# Patient Record
Sex: MALE | Race: WHITE | NOT HISPANIC OR LATINO | Employment: OTHER | URBAN - NONMETROPOLITAN AREA
[De-identification: names, ages, dates, MRNs, and addresses within clinical notes are randomized per-mention and may not be internally consistent; named-entity substitution may affect disease eponyms.]

---

## 2017-01-20 ENCOUNTER — APPOINTMENT (OUTPATIENT)
Dept: LAB | Facility: MEDICAL CENTER | Age: 72
End: 2017-01-20
Payer: COMMERCIAL

## 2017-01-20 ENCOUNTER — TRANSCRIBE ORDERS (OUTPATIENT)
Dept: LAB | Facility: MEDICAL CENTER | Age: 72
End: 2017-01-20

## 2017-01-20 DIAGNOSIS — I49.9 CARDIAC ARRHYTHMIA: ICD-10-CM

## 2017-01-20 DIAGNOSIS — R73.09 OTHER ABNORMAL GLUCOSE: ICD-10-CM

## 2017-01-20 DIAGNOSIS — E78.5 HYPERLIPIDEMIA: ICD-10-CM

## 2017-01-20 DIAGNOSIS — I48.91 ATRIAL FIBRILLATION (HCC): ICD-10-CM

## 2017-01-20 LAB
ALBUMIN SERPL BCP-MCNC: 4 G/DL (ref 3.5–5)
ALP SERPL-CCNC: 50 U/L (ref 46–116)
ALT SERPL W P-5'-P-CCNC: 42 U/L (ref 12–78)
ANION GAP SERPL CALCULATED.3IONS-SCNC: 6 MMOL/L (ref 4–13)
AST SERPL W P-5'-P-CCNC: 24 U/L (ref 5–45)
BILIRUB SERPL-MCNC: 1.31 MG/DL (ref 0.2–1)
BUN SERPL-MCNC: 18 MG/DL (ref 5–25)
CALCIUM SERPL-MCNC: 8.9 MG/DL (ref 8.3–10.1)
CHLORIDE SERPL-SCNC: 101 MMOL/L (ref 100–108)
CHOLEST SERPL-MCNC: 238 MG/DL (ref 50–200)
CO2 SERPL-SCNC: 30 MMOL/L (ref 21–32)
CREAT SERPL-MCNC: 0.94 MG/DL (ref 0.6–1.3)
EST. AVERAGE GLUCOSE BLD GHB EST-MCNC: 148 MG/DL
GFR SERPL CREATININE-BSD FRML MDRD: >60 ML/MIN/1.73SQ M
GLUCOSE SERPL-MCNC: 156 MG/DL (ref 65–140)
HBA1C MFR BLD: 6.8 % (ref 4.2–6.3)
HDLC SERPL-MCNC: 87 MG/DL (ref 40–60)
LDLC SERPL CALC-MCNC: 122 MG/DL (ref 0–100)
POTASSIUM SERPL-SCNC: 4.4 MMOL/L (ref 3.5–5.3)
PROT SERPL-MCNC: 7.9 G/DL (ref 6.4–8.2)
SODIUM SERPL-SCNC: 137 MMOL/L (ref 136–145)
TESTOST SERPL-MCNC: 562.7 NG/DL (ref 241–827)
TRIGL SERPL-MCNC: 147 MG/DL
TSH SERPL DL<=0.05 MIU/L-ACNC: 1.26 UIU/ML (ref 0.36–3.74)

## 2017-01-20 PROCEDURE — 80061 LIPID PANEL: CPT

## 2017-01-20 PROCEDURE — 80053 COMPREHEN METABOLIC PANEL: CPT

## 2017-01-20 PROCEDURE — 84443 ASSAY THYROID STIM HORMONE: CPT

## 2017-01-20 PROCEDURE — 84403 ASSAY OF TOTAL TESTOSTERONE: CPT

## 2017-01-20 PROCEDURE — 83036 HEMOGLOBIN GLYCOSYLATED A1C: CPT

## 2017-01-20 PROCEDURE — 36415 COLL VENOUS BLD VENIPUNCTURE: CPT

## 2017-02-23 ENCOUNTER — ALLSCRIPTS OFFICE VISIT (OUTPATIENT)
Dept: OTHER | Facility: OTHER | Age: 72
End: 2017-02-23

## 2017-04-04 ENCOUNTER — TRANSCRIBE ORDERS (OUTPATIENT)
Dept: ADMINISTRATIVE | Facility: HOSPITAL | Age: 72
End: 2017-04-04

## 2017-04-04 ENCOUNTER — ALLSCRIPTS OFFICE VISIT (OUTPATIENT)
Dept: OTHER | Facility: OTHER | Age: 72
End: 2017-04-04

## 2017-04-04 DIAGNOSIS — I35.1 AORTIC VALVE REGURGITATION, UNSPECIFIED ETIOLOGY: Primary | ICD-10-CM

## 2017-04-12 ENCOUNTER — HOSPITAL ENCOUNTER (OUTPATIENT)
Dept: NON INVASIVE DIAGNOSTICS | Facility: HOSPITAL | Age: 72
Discharge: HOME | End: 2017-04-12
Attending: INTERNAL MEDICINE
Payer: COMMERCIAL

## 2017-04-12 DIAGNOSIS — I35.1 AORTIC VALVE REGURGITATION, UNSPECIFIED ETIOLOGY: ICD-10-CM

## 2017-04-12 PROCEDURE — 93306 TTE W/DOPPLER COMPLETE: CPT

## 2017-04-12 RX ADMIN — PERFLUTREN 4 ML/MIN: 6.52 INJECTION, SUSPENSION INTRAVENOUS at 13:50

## 2017-06-04 DIAGNOSIS — R60.0 LOCALIZED EDEMA: ICD-10-CM

## 2017-06-04 DIAGNOSIS — I35.1 NONRHEUMATIC AORTIC VALVE INSUFFICIENCY: ICD-10-CM

## 2017-06-04 DIAGNOSIS — E78.5 HYPERLIPIDEMIA: ICD-10-CM

## 2017-06-22 ENCOUNTER — TRANSCRIBE ORDERS (OUTPATIENT)
Dept: LAB | Facility: MEDICAL CENTER | Age: 72
End: 2017-06-22

## 2017-06-23 ENCOUNTER — TRANSCRIBE ORDERS (OUTPATIENT)
Dept: LAB | Facility: MEDICAL CENTER | Age: 72
End: 2017-06-23

## 2017-06-23 ENCOUNTER — APPOINTMENT (OUTPATIENT)
Dept: LAB | Facility: MEDICAL CENTER | Age: 72
End: 2017-06-23
Payer: COMMERCIAL

## 2017-06-23 DIAGNOSIS — I35.1 NONRHEUMATIC AORTIC VALVE INSUFFICIENCY: ICD-10-CM

## 2017-06-23 DIAGNOSIS — E78.5 HYPERLIPIDEMIA: ICD-10-CM

## 2017-06-23 DIAGNOSIS — R60.0 LOCALIZED EDEMA: ICD-10-CM

## 2017-06-23 LAB
ALBUMIN SERPL BCP-MCNC: 4 G/DL (ref 3.5–5)
ALP SERPL-CCNC: 48 U/L (ref 46–116)
ALT SERPL W P-5'-P-CCNC: 53 U/L (ref 12–78)
ANION GAP SERPL CALCULATED.3IONS-SCNC: 9 MMOL/L (ref 4–13)
AST SERPL W P-5'-P-CCNC: 33 U/L (ref 5–45)
BILIRUB SERPL-MCNC: 1.54 MG/DL (ref 0.2–1)
BUN SERPL-MCNC: 18 MG/DL (ref 5–25)
CALCIUM SERPL-MCNC: 8.7 MG/DL (ref 8.3–10.1)
CHLORIDE SERPL-SCNC: 102 MMOL/L (ref 100–108)
CHOLEST SERPL-MCNC: 165 MG/DL (ref 50–200)
CO2 SERPL-SCNC: 27 MMOL/L (ref 21–32)
CREAT SERPL-MCNC: 0.85 MG/DL (ref 0.6–1.3)
GFR SERPL CREATININE-BSD FRML MDRD: >60 ML/MIN/1.73SQ M
GLUCOSE P FAST SERPL-MCNC: 151 MG/DL (ref 65–99)
HDLC SERPL-MCNC: 84 MG/DL (ref 40–60)
LDLC SERPL CALC-MCNC: 64 MG/DL (ref 0–100)
POTASSIUM SERPL-SCNC: 3.7 MMOL/L (ref 3.5–5.3)
PROT SERPL-MCNC: 7.4 G/DL (ref 6.4–8.2)
SODIUM SERPL-SCNC: 138 MMOL/L (ref 136–145)
TRIGL SERPL-MCNC: 83 MG/DL

## 2017-06-23 PROCEDURE — 80061 LIPID PANEL: CPT

## 2017-06-23 PROCEDURE — 80053 COMPREHEN METABOLIC PANEL: CPT

## 2017-06-23 PROCEDURE — 36415 COLL VENOUS BLD VENIPUNCTURE: CPT

## 2017-07-13 ENCOUNTER — ALLSCRIPTS OFFICE VISIT (OUTPATIENT)
Dept: OTHER | Facility: OTHER | Age: 72
End: 2017-07-13

## 2017-07-13 DIAGNOSIS — R06.02 SHORTNESS OF BREATH: ICD-10-CM

## 2017-07-13 DIAGNOSIS — I25.10 ATHEROSCLEROTIC HEART DISEASE OF NATIVE CORONARY ARTERY WITHOUT ANGINA PECTORIS: ICD-10-CM

## 2017-07-18 ENCOUNTER — TRANSCRIBE ORDERS (OUTPATIENT)
Dept: SLEEP CENTER | Facility: HOSPITAL | Age: 72
End: 2017-07-18

## 2017-07-18 DIAGNOSIS — G47.33 OBSTRUCTIVE SLEEP APNEA (ADULT) (PEDIATRIC): Primary | ICD-10-CM

## 2017-07-19 ENCOUNTER — HOSPITAL ENCOUNTER (OUTPATIENT)
Dept: SLEEP CENTER | Facility: HOSPITAL | Age: 72
Discharge: HOME | End: 2017-07-19
Attending: INTERNAL MEDICINE
Payer: COMMERCIAL

## 2017-07-19 DIAGNOSIS — G47.33 OBSTRUCTIVE SLEEP APNEA (ADULT) (PEDIATRIC): ICD-10-CM

## 2017-07-19 PROCEDURE — G0399 HOME SLEEP TEST/TYPE 3 PORTA: HCPCS

## 2017-07-21 ENCOUNTER — APPOINTMENT (OUTPATIENT)
Dept: LAB | Facility: HOSPITAL | Age: 72
End: 2017-07-21
Attending: INTERNAL MEDICINE
Payer: COMMERCIAL

## 2017-07-21 DIAGNOSIS — G47.30 SLEEP APNEA: ICD-10-CM

## 2017-07-21 LAB
ANION GAP SERPL CALCULATED.3IONS-SCNC: 12 MMOL/L (ref 4–13)
BUN SERPL-MCNC: 18 MG/DL (ref 5–25)
CALCIUM SERPL-MCNC: 8.8 MG/DL (ref 8.3–10.1)
CHLORIDE SERPL-SCNC: 102 MMOL/L (ref 100–108)
CO2 SERPL-SCNC: 26 MMOL/L (ref 21–32)
CREAT SERPL-MCNC: 0.94 MG/DL (ref 0.6–1.3)
GFR SERPL CREATININE-BSD FRML MDRD: >60 ML/MIN/1.73SQ M
GLUCOSE P FAST SERPL-MCNC: 118 MG/DL (ref 65–99)
POTASSIUM SERPL-SCNC: 3.8 MMOL/L (ref 3.5–5.3)
SODIUM SERPL-SCNC: 140 MMOL/L (ref 136–145)

## 2017-07-21 PROCEDURE — 36415 COLL VENOUS BLD VENIPUNCTURE: CPT

## 2017-07-21 PROCEDURE — 80048 BASIC METABOLIC PNL TOTAL CA: CPT

## 2017-07-25 ENCOUNTER — TRANSCRIBE ORDERS (OUTPATIENT)
Dept: SLEEP CENTER | Facility: HOSPITAL | Age: 72
End: 2017-07-25

## 2017-07-25 DIAGNOSIS — G47.33 OBSTRUCTIVE SLEEP APNEA (ADULT) (PEDIATRIC): Primary | ICD-10-CM

## 2017-08-07 ENCOUNTER — TRANSCRIBE ORDERS (OUTPATIENT)
Dept: ADMINISTRATIVE | Facility: HOSPITAL | Age: 72
End: 2017-08-07

## 2017-08-07 DIAGNOSIS — I25.119 ATHEROSCLEROSIS OF NATIVE CORONARY ARTERY OF NATIVE HEART WITH ANGINA PECTORIS (HCC): Primary | ICD-10-CM

## 2017-08-07 DIAGNOSIS — R06.02 SHORTNESS OF BREATH: ICD-10-CM

## 2017-08-13 DIAGNOSIS — G47.30 SLEEP APNEA: ICD-10-CM

## 2017-08-14 ENCOUNTER — HOSPITAL ENCOUNTER (OUTPATIENT)
Dept: NUCLEAR MEDICINE | Facility: HOSPITAL | Age: 72
Discharge: HOME | End: 2017-08-14
Attending: INTERNAL MEDICINE
Payer: COMMERCIAL

## 2017-08-14 ENCOUNTER — GENERIC CONVERSION - ENCOUNTER (OUTPATIENT)
Dept: OTHER | Facility: OTHER | Age: 72
End: 2017-08-14

## 2017-08-14 DIAGNOSIS — R06.02 SHORTNESS OF BREATH: ICD-10-CM

## 2017-08-14 DIAGNOSIS — I25.10 ATHEROSCLEROTIC HEART DISEASE OF NATIVE CORONARY ARTERY WITHOUT ANGINA PECTORIS: ICD-10-CM

## 2017-08-14 PROCEDURE — A9502 TC99M TETROFOSMIN: HCPCS

## 2017-08-14 PROCEDURE — 78452 HT MUSCLE IMAGE SPECT MULT: CPT

## 2017-08-14 PROCEDURE — 93017 CV STRESS TEST TRACING ONLY: CPT

## 2017-08-17 ENCOUNTER — HOSPITAL ENCOUNTER (OUTPATIENT)
Dept: SLEEP CENTER | Facility: HOSPITAL | Age: 72
Discharge: HOME | End: 2017-08-17
Attending: INTERNAL MEDICINE
Payer: COMMERCIAL

## 2017-08-17 DIAGNOSIS — G47.33 OBSTRUCTIVE SLEEP APNEA (ADULT) (PEDIATRIC): ICD-10-CM

## 2017-08-17 PROCEDURE — 95811 POLYSOM 6/>YRS CPAP 4/> PARM: CPT

## 2017-08-24 ENCOUNTER — TRANSCRIBE ORDERS (OUTPATIENT)
Dept: SLEEP CENTER | Facility: HOSPITAL | Age: 72
End: 2017-08-24

## 2017-08-24 DIAGNOSIS — G47.33 OBSTRUCTIVE SLEEP APNEA (ADULT) (PEDIATRIC): Primary | ICD-10-CM

## 2017-08-31 ENCOUNTER — HOSPITAL ENCOUNTER (OUTPATIENT)
Dept: SLEEP CENTER | Facility: HOSPITAL | Age: 72
Discharge: HOME | End: 2017-08-31
Attending: INTERNAL MEDICINE
Payer: COMMERCIAL

## 2017-08-31 DIAGNOSIS — G47.33 OBSTRUCTIVE SLEEP APNEA (ADULT) (PEDIATRIC): ICD-10-CM

## 2017-09-05 ENCOUNTER — TRANSCRIBE ORDERS (OUTPATIENT)
Dept: SLEEP CENTER | Facility: HOSPITAL | Age: 72
End: 2017-09-05

## 2017-09-05 DIAGNOSIS — G47.33 OBSTRUCTIVE SLEEP APNEA (ADULT) (PEDIATRIC): Primary | ICD-10-CM

## 2017-09-19 ENCOUNTER — ALLSCRIPTS OFFICE VISIT (OUTPATIENT)
Dept: OTHER | Facility: OTHER | Age: 72
End: 2017-09-19

## 2017-09-19 DIAGNOSIS — I73.9 PERIPHERAL VASCULAR DISEASE (HCC): ICD-10-CM

## 2017-09-19 DIAGNOSIS — R06.02 SHORTNESS OF BREATH: ICD-10-CM

## 2017-09-25 ENCOUNTER — HOSPITAL ENCOUNTER (OUTPATIENT)
Dept: ULTRASOUND IMAGING | Facility: HOSPITAL | Age: 72
Discharge: HOME | End: 2017-09-25
Attending: INTERNAL MEDICINE
Payer: COMMERCIAL

## 2017-09-25 ENCOUNTER — TRANSCRIBE ORDERS (OUTPATIENT)
Dept: SLEEP CENTER | Facility: HOSPITAL | Age: 72
End: 2017-09-25

## 2017-09-25 DIAGNOSIS — I73.9 PERIPHERAL VASCULAR DISEASE (HCC): ICD-10-CM

## 2017-09-25 DIAGNOSIS — G47.33 OBSTRUCTIVE SLEEP APNEA (ADULT) (PEDIATRIC): Primary | ICD-10-CM

## 2017-09-25 PROCEDURE — 93925 LOWER EXTREMITY STUDY: CPT

## 2017-09-25 PROCEDURE — 93923 UPR/LXTR ART STDY 3+ LVLS: CPT

## 2017-09-26 ENCOUNTER — ALLSCRIPTS OFFICE VISIT (OUTPATIENT)
Dept: OTHER | Facility: OTHER | Age: 72
End: 2017-09-26

## 2017-10-06 ENCOUNTER — TRANSCRIBE ORDERS (OUTPATIENT)
Dept: LAB | Facility: MEDICAL CENTER | Age: 72
End: 2017-10-06

## 2017-10-06 ENCOUNTER — APPOINTMENT (OUTPATIENT)
Dept: RADIOLOGY | Facility: MEDICAL CENTER | Age: 72
End: 2017-10-06
Payer: COMMERCIAL

## 2017-10-06 DIAGNOSIS — R06.02 SHORTNESS OF BREATH: ICD-10-CM

## 2017-10-06 PROCEDURE — 71020 HB CHEST X-RAY 2VW FRONTAL&LATL: CPT

## 2018-01-10 NOTE — PROGRESS NOTES
Assessment    1  Encounter for preventive health examination (V70 0) (Z00 00)    Plan  Atrial fibrillation, Coronary artery disease, Hypertension, Neuropathy    · Follow-up visit in 4 Months Evaluation and Treatment  Follow-up  Status: Complete  Done:  [id number]    Chief Complaint  pt is being seen today for a wellness visit  discussed medication with pt   no med refills needed today  Active Problems    1  Atrial fibrillation (427 31) (I48 91)   2  Coronary artery disease (414 00) (I25 10)   3  Counseling regarding advanced directives (V65 49) (Z71 89)   4  Diarrhea (787 91) (R19 7)   5  Dyslipidemia (272 4) (E78 5)   6  GERD without esophagitis (530 81) (K21 9)   7  Glaucoma screening (V80 1) (Z13 5)   8  Hypertension (401 9) (I10)   9  Irregular cardiac rhythm (427 9) (I49 9)   10  Lesion of bladder (596 9) (N32 9)   11  Liver lesion (573 8) (K76 9)   12  Microscopic hematuria (599 72) (R31 2)   13  Nasal congestion (478 19) (R09 81)   14  Neuropathy (355 9) (G62 9)   15  No advance directives (V49 89) (Z78 9)   16  Obesity (278 00) (E66 9)   17  Obstructive hydrocephalus (331 4) (G91 1)   18  Other muscle spasm (728 85) (M62 838)   19  Paroxysmal atrial tachycardia (427 0) (I47 1)   20  Renovascular hypertension (405 91) (I15 0)   21   Rheumatoid arthritis (714 0) (M06 9)    Past Medical History    · History of Contact dermatitis due to poison ivy (692 6) (L23 7)   · History of Encounter for screening colonoscopy (V76 51) (Z12 11)   · History of gout (V12 29) (Z87 39)   · History of Pre-op chest exam (V72 82) (Z01 811)   · History of Pre-op exam (V72 84) (Z01 818)   · History of Special screening examination for neoplasm of prostate (V76 44) (Z12 5)   · History of Vitamin D deficiency (268 9) (E55 9)    Surgical History    · History of Appendectomy   · History of Cardiac Cath Procedure Summary   · History of Diagnostic Cystoscopy   · History of Hemorrhoidectomy   · History of Lower Back Surgery   · History of Partial Colectomy    Family History    · Family history of Diabetes Mellitus (V18 0)   · Family history of Stroke Syndrome (V17 1)    · Denied: Family history of Coronary artery disease, occlusive   · Denied: Family history of Drug abuse    Social History    · Alcohol use (V49 89) (Z78 9)   · Dental care, regularly   · Denied: History of Drug use   · Former smoker (V15 82) (Z87 891)   · Good dental hygiene   ·    ·    · No advance directives (V49 89) (Z78 9)   · No caffeine use   · Uses Safety Equipment - Seatbelts    Current Meds   1  Atorvastatin Calcium 40 MG Oral Tablet; Take 1 tablet daily; Therapy: 11LGI6467 to (Evaluate:21Mar2016)  Requested for: 92DER9553; Last   Rx:27Mar2015 Ordered   2  Folic Acid 1 MG Oral Tablet; TAKE (1) TABLET BY MOUTH DAILY; Therapy: 03ZHM2351 to (Last Rx:13Jan2016)  Requested for: 60MER1473 Ordered   3  Furosemide 20 MG Oral Tablet; TAKE (1) TABLET DAILY; Therapy: 90GVY8749 to (Last Rx:13Jan2016)  Requested for: 14VJV3314 Ordered   4  Gabapentin 300 MG Oral Capsule; TAKE (1) CAPSULE DAILY AT BEDTIME; Therapy: 37YTR3357 to (Last Rx:13Jan2016)  Requested for: 32HBY5742 Ordered   5  Lisinopril 20 MG Oral Tablet; TAKE (1) TABLET BY MOUTH TWICE DAILY; Therapy: 39NOX8989 to (Last Rx:80Edg3760)  Requested for: 85Cax9187 Ordered   6  Losartan Potassium 100 MG Oral Tablet; TAKE (1) TABLET DAILY; Therapy: 68DHU1389 to (Last Rx:13Jan2016)  Requested for: 55BQT0418 Ordered   7  NIFEdipine ER Osmotic Release 60 MG Oral Tablet Extended Release 24 Hour; TAKE 1   TABLET DAILY; Therapy: 09ZYC6713 to (Evaluate:10Mar2017)  Requested for: 51QIC4947; Last   Rx:15Mar2016 Ordered   8  Omeprazole 20 MG Oral Capsule Delayed Release; TAKE (1) CAPSULE DAILY; Therapy: 80CXL9343 to (Last Rx:77Jqu0485)  Requested for: 97FZV7767 Ordered   9  PredniSONE 5 MG Oral Tablet; Take 1 tablet by mouth daily; Therapy: 81AZN2278 to (Last Rx:15Mar2016)  Requested for: 90EDV2685 Ordered   10  Welchol 625 MG Oral Tablet; TAKE ONE TABLET BY MOUTH TWICE A DAY; Therapy: 12ATK8876 to (Last Rx:45Frx8301)  Requested for: 82AMA5830 Ordered    Allergies    1  No Known Drug Allergies    Vitals   Recorded: 50Uly8263 09:26AM Recorded: 18Apr2016 09:18AM   Temperature  13 0 F   Systolic 273    Diastolic 72    Height  5 ft 9 in   Weight  264 lb 4 00 oz   BMI Calculated  39 02   BSA Calculated  2 32     Future Appointments    Date/Time Provider Specialty Site   08/23/2016 09:30 AM Halina Paget, DO Internal Medicine [facility]     Signatures   Electronically signed by : Olaf Mendez DO;  Apr 19 2016  7:29AM EST                       (Author)

## 2018-01-12 VITALS
SYSTOLIC BLOOD PRESSURE: 150 MMHG | HEART RATE: 90 BPM | WEIGHT: 263.13 LBS | BODY MASS INDEX: 51.66 KG/M2 | DIASTOLIC BLOOD PRESSURE: 84 MMHG | HEIGHT: 60 IN

## 2018-01-13 VITALS
DIASTOLIC BLOOD PRESSURE: 76 MMHG | HEIGHT: 69 IN | SYSTOLIC BLOOD PRESSURE: 160 MMHG | BODY MASS INDEX: 40.43 KG/M2 | WEIGHT: 273 LBS | HEART RATE: 82 BPM

## 2018-01-13 VITALS
SYSTOLIC BLOOD PRESSURE: 170 MMHG | WEIGHT: 271 LBS | BODY MASS INDEX: 40.14 KG/M2 | DIASTOLIC BLOOD PRESSURE: 83 MMHG | HEIGHT: 69 IN | HEART RATE: 85 BPM

## 2018-01-14 VITALS
SYSTOLIC BLOOD PRESSURE: 148 MMHG | OXYGEN SATURATION: 97 % | HEIGHT: 60 IN | TEMPERATURE: 97.6 F | WEIGHT: 261.5 LBS | HEART RATE: 80 BPM | BODY MASS INDEX: 51.34 KG/M2 | DIASTOLIC BLOOD PRESSURE: 78 MMHG

## 2018-01-14 VITALS
HEART RATE: 86 BPM | SYSTOLIC BLOOD PRESSURE: 124 MMHG | HEIGHT: 69 IN | DIASTOLIC BLOOD PRESSURE: 74 MMHG | TEMPERATURE: 97.7 F | OXYGEN SATURATION: 98 % | BODY MASS INDEX: 39.72 KG/M2 | WEIGHT: 268.19 LBS

## 2018-01-17 ENCOUNTER — ALLSCRIPTS OFFICE VISIT (OUTPATIENT)
Dept: OTHER | Facility: OTHER | Age: 73
End: 2018-01-17

## 2018-01-18 NOTE — PROGRESS NOTES
Assessment   Assessed    1  Chronic diastolic CHF (congestive heart failure) (428 32,428 0) (I50 32)   2  Coronary artery disease (414 00) (I25 10)   3  History of Cardiac Cath Procedure Summary   4  Hypertension (401 9) (I10)   5  Dyslipidemia (272 4) (E78 5)   6  Aortic stenosis, mild (424 1) (I35 0)   7  Paroxysmal atrial fibrillation (427 31) (I48 0)   8  Paroxysmal atrial tachycardia (427 0) (I47 1)   9  Obstructive sleep apnea of adult (327 23) (G47 33)    Plan   Aortic stenosis, mild, Chronic diastolic CHF (congestive heart failure), Coronary artery    disease, Dyslipidemia, Hypertension, Obstructive sleep apnea of adult, Paroxysmal    atrial fibrillation, Paroxysmal atrial tachycardia    · Follow-up visit in 4 Months Evaluation and Treatment  Follow-up  Status: Hold For -    Scheduling  Requested for: 31PXU5244   Ordered; For: Aortic stenosis, mild, Chronic diastolic CHF (congestive heart failure), Coronary artery disease, Dyslipidemia, Hypertension, Obstructive sleep apnea of adult, Paroxysmal atrial fibrillation, Paroxysmal atrial tachycardia; Ordered By: Mike Loera Performed:  Due: 32KTK7862  Hypertension    · HydrALAZINE HCl - 100 MG Oral Tablet; TAKE 1 TABLET 3 times daily   Rx By: Mike Loera; Dispense: 90 Days ; #:270 Tablet; Refill: 3;For: Hypertension; TASNEEM = N; Verified Transmission to [facility]; Last Updated By: System, [employer]; 1/17/2018 2:05:45 PM   · EKG/ECG- POC; Status:Complete;   Done: 84QBB1589   Perform: In Office; 9135 8202; Last Updated Stanislaw Cutting; 1/17/2018 2:12:46 PM;Ordered; Today;For:Hypertension; Ordered By:Shelbie Moya;    Discussion/Summary   Cardiology Discussion Summary Free Text Note Form [facility]:    Mr Kitty Zapata is a pleasant 82-year-old male who presents to the office today for routine follow-up  Since his last visit symptomatically he has improved  his respiratory status has improved   He appears relatively euvolemic on exam  He will continue on his current diuretic regimen  I have asked him to continue to weigh himself and notify the office of a 2 to 3 lb weight gain overnight or 5 lb weight gain in one week  shortness of breath with exertion has also improved  However he did undergo a nuclear stress test after his last visit given his nonobstructive CAD which did not reveal any evidence of ischemia or scar  blood pressure remains uncontrolled  I will increase his hydralazine to 100 mg three times daily  did attempt statin therapy but this caused nausea and thus he discontinued the medication  was diagnosed with severe obstructive sleep apnea  He has been intolerant of multiple types of masks and therefore remains noncompliant with CPAP therapy  His sleep apnea is certainly a contributor to his difficult to control blood pressure and PAF  is maintaining sinus rhythm  He will remain on systemic anticoagulation with Eliquis  will return to the office in four months for ongoing evaluation  History of Present Illness   Cardiology HPI Free Text Note Form [facility]: Mr Cheryll Halsted is a pleasant 82-year-old male who presents to the office today for routine follow-up  his last visit he has been feeling better  He states his lower extremity edema has greatly improved  His shortness of breath has also greatly improved  He denies any paroxysmal nocturnal dyspnea or orthopnea  He denies increasing abdominal girth  He weighs himself at home with a weight of about a 262 lb     his last visit he had one episode of sharp pain in his chest which occurred when he was out hunting and it was particularly cold outside  This occurred when he was walking and resolved within about 30 seconds of rest  This has not recurred  denies palpitations, lightheadedness, syncope or presyncope  He denies symptoms of claudication  is maintained on Eliquis without any bleeding consequences     states he has attempted a few different types of masks but has been intolerant and therefore is not compliant with CPAP  Review of Systems   Cardiology Male ROS:         Cardiac: as noted in HPI  Skin: No complaints of nonhealing sores or skin rash  Genitourinary: No complaints of recurrent urinary tract infections, frequent urination at night, difficult urination, blood in urine, kidney stones, loss of bladder control, no kidney or prostate problems, no erectile dysfunction  Psychological: No complaints of feeling depressed, anxiety, panic attacks, or difficulty concentrating  General: as noted in HPI  Respiratory: as noted in HPI  HEENT: No complaints of serious problems, hearing problems, nose problems, throat problems, or snoring  Gastrointestinal: No complaints of liver problems, nausea, vomiting, heartburn, constipation, bloody stools, diarrhea, problems swallowing, adbominal pain, or rectal bleeding  Hematologic: No complaints of bleeding disorders, anemia, blood clots, or excessive brusing  Neurological: No complaints of numbness, tingling, dizziness, weakness, seizures, headaches, syncope or fainting, AM fatigue, daytime sleepiness, no witnessed apnea episodes  Musculoskeletal: No complaints of arthritis, back pain, or painfull swelling  Active Problems   Problems    1  Allergic rhinitis due to pollen (477 0) (J30 1)   2  Aortic stenosis, mild (424 1) (I35 0)   3  Bilateral leg edema (782 3) (R60 0)   4  Chronic diastolic CHF (congestive heart failure) (428 32,428 0) (I50 32)   5  Coronary artery disease (414 00) (I25 10)   6  Dyslipidemia (272 4) (E78 5)   7  Dyspnea (786 09) (R06 00)   8  Erectile dysfunction (607 84) (N52 9)   9  GERD without esophagitis (530 81) (K21 9)   10  Hypertension (401 9) (I10)   11  Influenza (487 1) (J11 1)   12  Intermittent claudication (443 9) (I73 9)   13  Irregular cardiac rhythm (427 9) (I49 9)   14  Lesion of bladder (596 9) (N32 9)   15  Liver lesion (573 8) (K76 9)   16   Microscopic hematuria (599 72) (R31 29)   17  Neuropathy (355 9) (G62 9)   18  No advance directives (V49 89) (Z78 9)   19  Obesity (278 00) (E66 9)   20  Obstructive hydrocephalus (331 4) (G91 1)   21  Obstructive sleep apnea of adult (327 23) (G47 33)   22  Paroxysmal atrial fibrillation (427 31) (I48 0)   23  Paroxysmal atrial tachycardia (427 0) (I47 1)   24  Refused influenza vaccine (V64 06) (Z28 21)   25  Renovascular hypertension (405 91) (I15 0)   26  Rheumatoid arthritis (714 0) (M06 9)   27  Shortness of breath on exertion (786 05) (R06 02)   28  Tick bite (919 4,E906 4) [facility])    Past Medical History   Active Problems And Past Medical History Reviewed: The active problems and past medical history were reviewed and updated today  Surgical History   Problems    1  History of Appendectomy   2  History of Cardiac Cath Procedure Summary   3  History of Diagnostic Cystoscopy   4  History of Hemorrhoidectomy   5  History of Lower Back Surgery   6  History of Partial Colectomy  Surgical History Reviewed: The surgical history was reviewed and updated today  Family History   Mother    1  Family history of Diabetes Mellitus (V18 0)   2  Family history of Stroke Syndrome (V17 1)  Family History    3  Denied: Family history of Coronary artery disease, occlusive   4  Denied: Family history of Drug abuse  Family History Reviewed: The family history was reviewed and updated today  Social History   Problems    · Alcohol use (V49 89) (Z78 9)   · Dental care, regularly   · Denied: History of Drug use   · Former smoker (V15 82) (Z87 891)   · Good dental hygiene   ·    ·    · No advance directives (V49 89) (Z78 9)   · No caffeine use   · Uses Safety Equipment - Seatbelts  Social History Reviewed: The social history was reviewed and updated today  Current Meds    1  Doxycycline Monohydrate 100 MG Oral Capsule; TAKE 1 CAPSULE TWICE DAILY WITH     MEALS;      Therapy: 78YLP4403 to (Evaluate:84Dmb8306) Requested for: 22VUQ0802; Last     Rx:91Aoc5236 Ordered   2  Eliquis 5 MG Oral Tablet; Take 1 tablet twice daily; Therapy: 50Xbo1597 to (Evaluate:51Ozn6532)  Requested for: 72Yat6500; Last     Rx:27Udf6587 Ordered   3  Folic Acid 1 MG Oral Tablet; TAKE (1) TABLET BY MOUTH DAILY; Therapy: 02WYT9842 to (Last Rx:89Xxw5087)  Requested for: 25Oct2017 Ordered   4  Furosemide 40 MG Oral Tablet; take one tablet by mouth twice daily; Therapy: 37FLE6986 to (Last Rx:24Nov2017)  Requested for: [phone] Ordered   5  HydrALAZINE HCl - 50 MG Oral Tablet; TAKE 1 TABLET Every 8 hours  Requested for:     24CTW4794; Last Rx:31Ktv5790 Ordered   6  Lisinopril 20 MG Oral Tablet; TAKE (1) TABLET BY MOUTH TWICE DAILY; Therapy: 46UZM0452 to (Last Rx:25Oct2017)  Requested for: 25Oct2017 Ordered   7  Losartan Potassium 100 MG Oral Tablet; TAKE (1) TABLET DAILY; Therapy: 96UBQ4856 to (Last Rx:25Oct2017)  Requested for: 25Oct2017 Ordered   8  Magnesium 400 MG CAPS; 1 Tab daily; Therapy: (Recorded:17Jan2018) to Recorded   9  Montelukast Sodium 10 MG Oral Tablet; TAKE (1) TABLET DAILY AS DIRECTED; Therapy: 52PDH2060 to (Last Rx:22Nov2017)  Requested for: 22Nov2017 Ordered   10  NIFEdipine ER Osmotic Release 90 MG Oral Tablet Extended Release 24 Hour; TAKE 1      TABLET DAILY; Therapy: 92KNB9325 to (Last Rx:63Twl5102)  Requested for: 99ZRR2664 Ordered   11  Omeprazole 20 MG Oral Capsule Delayed Release; TAKE (1) CAPSULE DAILY; Therapy: 25SWZ8491 to (Last Rx:22Nov2017)  Requested for: 22Nov2017 Ordered   12  Oseltamivir Phosphate 75 MG Oral Capsule; TAKE 1 CAPSULE TWICE DAILY; Therapy: 47OOE2034 to (Evaluate:04Yuy0103)  Requested for: 29Luj9025; Last      Rx:79Zwp6203 Ordered   13  PredniSONE 5 MG Oral Tablet; take one tablet daily; Therapy: 62FKI0684 to (Last Rx:22Nov2017)  Requested for: 22Nov2017 Ordered   14  Welchol 625 MG Oral Tablet; TAKE ONE TABLET BY MOUTH TWICE A DAY;       Therapy: 41TLE4260 to (Last Rx:73Ljl4241)  Requested for: 18Hax8223 Ordered  Medication List Reviewed: The medication list was reviewed and updated today  Allergies   Medication    1  No Known Drug Allergies    Vitals   Vital Signs    Recorded: 43NZL3503 01:51PM Recorded: 78BAY8164 01:35PM   Heart Rate  77   Systolic 192 121   Diastolic 70 78   Height  5 ft    Weight  259 lb    BMI Calculated  50 58   BSA Calculated  2 08     Physical Exam        Constitutional      General appearance: No acute distress, well appearing and well nourished  Eyes      Conjunctiva and Sclera examination: Conjunctiva pink, sclera anicteric  Ears, Nose, Mouth, and Throat - Oropharynx: Clear, nares are clear, mucous membranes are moist       Neck      Neck and thyroid: Normal, supple, trachea midline, no thyromegaly  Pulmonary      Respiratory effort: No increased work of breathing or signs of respiratory distress  Auscultation of lungs: Clear to auscultation, no rales, no rhonchi, no wheezing, good air movement  Cardiovascular      Auscultation of heart: Abnormal   The heart rate was normal  The rhythm was regular  Heart sounds: normal S1,-- normal S2,-- no S3-- and-- no S4  A grade 2 systolic murmur was heard at the RUSB  Carotid pulses: Normal, 2+ bilaterally  Peripheral vascular exam: Normal pulses throughout, no tenderness, erythema or swelling  Pedal pulses: Normal, 2+ bilaterally  Examination of extremities for edema and/or varicosities: Normal        Abdomen      Abdomen: Abnormal   The abdomen was obese  Bowel sounds were normal  The abdomen was soft and nontender  no masses palpated  Liver and spleen: No hepatomegaly or splenomegaly  Musculoskeletal Gait and station: Normal gait  -- Digits and nails: Normal without clubbing or cyanosis  -- Inspection/palpation of joints, bones, and muscles: Normal, ROM normal        Skin - Skin and subcutaneous tissue: Normal without rashes or lesions  Skin is warm and well perfused, normal turgor  Neurologic - Cranial nerves: II - XII intact  -- Speech: Normal        Psychiatric - Orientation to person, place, and time: Normal -- Mood and affect: Normal       Results/Data   ECG Report:      Rhythm and rate:  normal sinus rhythm  Ectopic Beats: Occasional atrial premature contractions are present  Q-waves are present in lead(s) II, III, AVF  T waves:   there are nonspecific ST-T wave changes        Signatures    Electronically signed by : Amber Roque DO; Jan 17 2018  2:15PM EST                       (Author)

## 2018-01-23 VITALS
HEIGHT: 60 IN | DIASTOLIC BLOOD PRESSURE: 70 MMHG | HEART RATE: 77 BPM | WEIGHT: 259 LBS | SYSTOLIC BLOOD PRESSURE: 150 MMHG | BODY MASS INDEX: 50.85 KG/M2

## 2018-01-24 NOTE — PROGRESS NOTES
Assessment   1  Former smoker (V15 82) (A59 688)  2  Chronic diastolic CHF (congestive heart failure) (428 32,428 0) (I50 32)  3  Intermittent claudication (443 9) (I73 9)  4  Hypertension (401 9) (I10)  5  Dyspnea (786 09) (R06 00)  6  Allergic rhinitis due to pollen (477 0) (J30 1)    Plan  Allergic rhinitis due to pollen    · Start: Montelukast Sodium 10 MG Oral Tablet (Singulair); TAKE 1 TABLET DAILY AS  DIRECTED  Chronic diastolic CHF (congestive heart failure), Rheumatoid arthritis    · Follow-up visit in 1 month Evaluation and Treatment  Follow-up  Status: Hold For -  Scheduling  Requested for: [id number]  Dyspnea    · CT CHEST W CONTRAST; Status:Need Information - Financial Authorization; Requested  LFT:[id number]; Health Maintenance    · *VB - Fall Risk Assessment  (Dx Z13 89 Screen for Neurologic Disorder);  Status:Complete - Retrospective By Protocol Authorization;   Done: 25JAB2053 11:20AM   · *VB - Urinary Incontinence Screen (Dx Z13 89 Screen for UI); Status:Complete -  Retrospective By Protocol Authorization;   Done: 22HXH0782 11:20AM  Refused influenza vaccine    · Temporarily Stop: Fluzone Quadrivalent Intramuscular Suspension    Discussion/Summary  health maintenance visit eats an adequate diet1  Currently, he  has an inadequate exercise regimen1  1   Prostate cancer screening:  prostate cancer screening is current1  1   Testicular cancer screening:  the patient declines testicular cancer screening1  and  monthly self testicular exam was advised1  1   Colorectal cancer screening:  colorectal cancer screening is current1  1   The  patient declines immunizations1  1   Advice and education were given regarding  weight loss1  and  monitor bp1  1   Patient discussion:  discussed with the patient1  1   The patient declines Hepatitis C screening  1      Pt to get ct chest  Low fat diet  Weight loss  monitor bp  Recent labs and cardiac testing reviewed  No added salt diet   Rto 1 month1    Possible side effects of new medications were reviewed with the patient/guardian today  The treatment plan was reviewed with the patient/guardian  The patient/guardian understands and agrees with the treatment plan     Self Referrals: No       1 Amended By: Jaleesa Cowan; Sep 26 2017 8:25 PM EST    Chief Complaint  Pt presents for annual exam  Pt feels well today  No falls  No refills needed today  appetite is normal per patient  Advance Directives  Advance Directive [facility]:   The patient is in agreement to receive the Advance Care Planning service    NO - Patient does not have an advance health care directive  Capacity/Competence: This patient has full decision making capacity for discussion of advance care planning and This patient has full decision making competency for discussion of advance care planning  History of Present Illness  HM, Adult Male: The patient is being seen for a health maintenance evaluation  The last health maintenance visit was 1 year year(s) ago  General Health: The patient's health since the last visit is described Navarro Rivera   He has regular dental visits1   He denies vision problems1   He denies hearing loss1   Immunizations status:1  not up to date1   Lifestyle:1   He does not have a healthy diet1   He has weight concerns1   He does not exercise regularly1   He does not use tobacco1   He consumes alcohol1   He denies drug use1   Reproductive health:1   The patient is not sexually active1   Birth control is not being practiced1   He denies erectile dysfunction1   Screening: Cancer screening reviewed and current1   Metabolic screening reviewed and current1   Risk screening reviewed and current1   HPI: Pt had cardio Workup which was negative  Has gibbons, dry cough  No chest pain  No joint pain   Diet not well controlled1        1 Amended By: Jaleesa Cowan; Sep 26 2017 8:15 PM EST    Review of Systems    Constitutional:1  No fever or chills, feels well, no tiredness, no recent weight gain or weight loss1 , no fever1  and no chills1   Eyes:1  No complaints of eye pain, no red eyes, no discharge from eyes, no itchy eyes1   ENT:1  no complaints of earache, no hearing loss, no nosebleeds, no nasal discharge, no sore throat, no hoarseness1   Cardiovascular: 1  No complaints of slow heart rate, no fast heart rate, no chest pain, no palpitations, no leg claudication, no lower extremity1   Respiratory:1  shortness of breath during exertion1   Gastrointestinal:1  No complaints of abdominal pain, no constipation, no nausea or vomiting, no diarrhea or bloody stools1   Genitourinary:1  No complaints of dysuria, no incontinence, no hesitancy, no nocturia, no genital lesion, no testicular pain1   Musculoskeletal:1  joint stiffness1   Integumentary:1  No complaints of skin rash or skin lesions, no itching, no skin wound, no dry skin1   Neurological:1  No compliants of headache, no confusion, no convulsions, no numbness or tingling, no dizziness or fainting, no limb weakness, no difficulty walking1   Psychiatric:1  Is not suicidal, no sleep disturbances, no anxiety or depression, no change in personality, no emotional problems1   Endocrine:1  No complaints of proptosis, no hot flashes, no muscle weakness, no erectile dysfunction, no deepening of the voice, no feelings of weakness1   Hematologic/Lymphatic:1  No complaints of swollen glands, no swollen glands in the neck, does not bleed easily, no easy bruising1   Preventive Quality 65 and Older: Falls Risk: The patient fell 0 times in the past 12 months  The patient is currently asymptomatic Symptoms Include:  Associated symptoms:  No associated symptoms are reported  The patient currently has no urinary incontinence symptoms  1 Amended By: Roni Viramontes; Sep 26 2017 8:16 PM EST    Active Problems   1  Aortic stenosis, mild (424 1) (I35 0)  2  Bilateral leg edema (782 3) (R60 0)  3   Chronic diastolic CHF to (Last OP:64GGI9475)  Requested for: 35TQQ2613 Ordered  3  Furosemide 40 MG Oral Tablet; TAKE 1 TABLET TWICE DAILY; Therapy: 25APV9048 to (22 783725)  Requested for: 76UKL9485; Last   Rx:48Wxn2374 Ordered  4  HydrALAZINE HCl - 50 MG Oral Tablet; TAKE 1 TABLET 3 TIMES DAILY; Therapy: (Recorded:24Ikt0734) to Recorded  5  Lisinopril 20 MG Oral Tablet; TAKE (1) TABLET BY MOUTH TWICE DAILY; Therapy: 11KDN8901 to (Last ZQ:61JZJ4531)  Requested for: 67BZR0282 Ordered  6  Losartan Potassium 100 MG Oral Tablet; TAKE (1) TABLET DAILY; Therapy: 16EST9309 to (Last RJ:24ALV1446)  Requested for: 16OCW1454 Ordered  7  NIFEdipine ER Osmotic Release 90 MG Oral Tablet Extended Release 24 Hour; TAKE 1   TABLET DAILY; Therapy: 93FBT6574 to (Evaluate:28Sqo7156)  Requested for: 08Ojd1886; Last   Rx:27Uxk7130 Ordered  8  Omeprazole 20 MG Oral Capsule Delayed Release; TAKE (1) CAPSULE DAILY; Therapy: 00YUC4951 to (Last Rx:34Pzr0950)  Requested for: 34FRE9143 Ordered  9  PredniSONE 5 MG Oral Tablet; take one tablet daily; Therapy: 99EYC7446 to (Last Rx:39Hvy6869)  Requested for: 68PJJ0301 Ordered  10  Welchol 625 MG Oral Tablet; TAKE ONE TABLET BY MOUTH TWICE A DAY; Therapy: 32MTP5367 to (Last Rx:35Ciy7032)  Requested for: 07Idn3934 Ordered    Allergies   1  No Known Drug Allergies    Vitals   Recorded: 71NHL9557 11:26AM Recorded: 86EVN4619 11:12AM   Temperature  97 6 F, Tympanic   Heart Rate  80   Systolic 661    Diastolic 78    Height  5 ft    Weight  261 lb 8 oz   BMI Calculated  51 07   BSA Calculated  2 09   O2 Saturation  97, RA     Physical Exam    Constitutional1    General appearance: No acute distress, well appearing and well nourished  1    Head and Face1    Head and face: Normal 1    Palpation of the face and sinuses: No sinus tenderness  1    Eyes1    Conjunctiva and lids: No erythema, swelling or discharge  1    Pupils and irises: Equal, round, reactive to light  1    Ophthalmoscopic examination: Normal fundi and optic discs  1    Ears, Nose, Mouth, and Throat1    External inspection of ears and nose: Normal 1    Otoscopic examination: Tympanic membranes translucent with normal light reflex  Canals patent without erythema  1    Hearing: Normal 1    Nasal mucosa, septum, and turbinates: Normal without edema or erythema  1    Lips, teeth, and gums: Normal, good dentition  1    Oropharynx: Normal with no erythema, edema, exudate or lesions  1    Neck1    Neck: Supple, symmetric, trachea midline, no masses  1    Thyroid: Normal, no thyromegaly  1    Pulmonary1    Respiratory effort: No increased work of breathing or signs of respiratory distress  1    Percussion of chest: Normal 1    Palpation of chest: Normal 1    Auscultation of lungs: Clear to auscultation  1    Cardiovascular1    Palpation of heart: Normal PMI, no thrills  1    Auscultation of heart: Normal rate and rhythm, normal S1 and S2, no murmurs  1    Carotid pulses: 2+ bilaterally  1    Abdominal aorta: Normal 1    Femoral pulses: 2+ bilaterally  1    Pedal pulses: 2+ bilaterally  1    Peripheral vascular exam: Normal 1    Examination of extremities for edema and/or varicosities: Normal 1    Abdomen1    Abdomen: Non-tender, no masses  1    Liver and spleen: No hepatomegaly or splenomegaly  1    Examination for hernias: No hernias appreciated  1    Lymphatic1    Palpation of lymph nodes in neck: No lymphadenopathy  1    Palpation of lymph nodes in axillae: No lymphadenopathy  1    Palpation of lymph nodes in groin: No lymphadenopathy  1    Palpation of lymph nodes in other areas: No lymphadenopathy  1    Musculoskeletal1    Gait and station: Normal 1    Inspection/palpation of digits and nails: Normal without clubbing or cyanosis  1    Inspection/palpation of joints, bones, and muscles: Normal 1    Range of motion: Normal 1    Stability: Normal 1    Muscle strength/tone: Normal 1    Skin1    Skin and subcutaneous tissue: Normal without rashes or lesions  1    Palpation of skin and subcutaneous tissue: Normal turgor  1    Neurologic1    Cranial nerves: Cranial nerves 2-12 intact  1    Cortical function: Normal mental status  1    Reflexes: 2+ and symmetric  1    Sensation: No sensory loss  1    Coordination: Normal finger to nose and heel to shin  1    Psychiatric1    Judgment and insight: Normal 1    Orientation to person, place and time: Normal 1    Recent and remote memory: Intact  1    Mood and affect: Normal 1        1 Amended By: Jesus Blackmon; Sep 26 2017 8:19 PM EST    Results/Data  Falls Risk Assessment (Dx Z13 89 Screen for Neurologic Disorder) 26Sep2017 11:21AM User, Ahs     Test Name Result Flag Reference   Falls Risk      No falls in the past year     *VB - Fall Risk Assessment  (Dx Z13 89 Screen for Neurologic Disorder) 26Sep2017 11:20AM Jesus Blackmon     Test Name Result Flag Reference   Falls Risk      No falls in the past year     *VB - Urinary Incontinence Screen (Dx Z13 89 Screen for UI) 15ZPL5441 11:20AM Jesus Blackmon     Test Name Result Flag Reference   Urinary Incontinence Assessment 26Sep2017         Signatures   Electronically signed by : Airam Morillo DO; Sep 26 2017  8:25PM EST                       (Author)

## 2018-01-29 ENCOUNTER — TELEPHONE (OUTPATIENT)
Dept: CARDIOLOGY CLINIC | Facility: HOSPITAL | Age: 73
End: 2018-01-29

## 2018-01-29 DIAGNOSIS — R00.2 PALPITATIONS: Primary | ICD-10-CM

## 2018-01-29 NOTE — TELEPHONE ENCOUNTER
Wife informed, but she is concerned about his asking for a monitor and she said that she will ask him tonight what prompted him to think that he would need one

## 2018-01-29 NOTE — TELEPHONE ENCOUNTER
He had an ECG done at his last visit a few weeks ago that revealed PACs  If his heart rate is not fast I think it is not necessary

## 2018-02-06 ENCOUNTER — HOSPITAL ENCOUNTER (OUTPATIENT)
Dept: NON INVASIVE DIAGNOSTICS | Facility: HOSPITAL | Age: 73
Discharge: HOME | End: 2018-02-06
Attending: INTERNAL MEDICINE
Payer: COMMERCIAL

## 2018-02-06 DIAGNOSIS — R00.2 PALPITATIONS: ICD-10-CM

## 2018-02-06 PROCEDURE — 93225 XTRNL ECG REC<48 HRS REC: CPT

## 2018-02-06 PROCEDURE — 93226 XTRNL ECG REC<48 HR SCAN A/R: CPT

## 2018-02-09 PROCEDURE — 93227 XTRNL ECG REC<48 HR R&I: CPT | Performed by: INTERNAL MEDICINE

## 2018-02-22 DIAGNOSIS — I15.0 RENOVASCULAR HYPERTENSION: Primary | ICD-10-CM

## 2018-02-22 RX ORDER — NIFEDIPINE 90 MG/1
90 TABLET, EXTENDED RELEASE ORAL DAILY
Qty: 30 TABLET | Refills: 5 | Status: SHIPPED | OUTPATIENT
Start: 2018-02-22 | End: 2018-05-14 | Stop reason: CLARIF

## 2018-02-22 RX ORDER — NIFEDIPINE 90 MG/1
1 TABLET, EXTENDED RELEASE ORAL DAILY
COMMUNITY
Start: 2016-03-15 | End: 2018-02-22 | Stop reason: SDUPTHER

## 2018-03-28 DIAGNOSIS — I10 ESSENTIAL HYPERTENSION: Primary | ICD-10-CM

## 2018-03-28 RX ORDER — LOSARTAN POTASSIUM 100 MG/1
TABLET ORAL
Qty: 30 TABLET | Refills: 4 | Status: SHIPPED | OUTPATIENT
Start: 2018-03-28 | End: 2018-08-23 | Stop reason: SDUPTHER

## 2018-04-20 ENCOUNTER — TELEPHONE (OUTPATIENT)
Dept: INTERNAL MEDICINE CLINIC | Facility: CLINIC | Age: 73
End: 2018-04-20

## 2018-04-26 DIAGNOSIS — M19.90 ARTHRITIS: ICD-10-CM

## 2018-04-26 DIAGNOSIS — Z91.09 ENVIRONMENTAL ALLERGIES: Primary | ICD-10-CM

## 2018-04-26 RX ORDER — MONTELUKAST SODIUM 10 MG/1
TABLET ORAL
Qty: 30 TABLET | Refills: 4 | Status: SHIPPED | OUTPATIENT
Start: 2018-04-26 | End: 2018-09-26 | Stop reason: SDUPTHER

## 2018-04-26 RX ORDER — PREDNISONE 1 MG/1
TABLET ORAL
Qty: 30 TABLET | Refills: 4 | Status: SHIPPED | OUTPATIENT
Start: 2018-04-26 | End: 2018-09-26 | Stop reason: SDUPTHER

## 2018-04-26 RX ORDER — FOLIC ACID 1 MG/1
TABLET ORAL
Qty: 30 TABLET | Refills: 4 | Status: SHIPPED | OUTPATIENT
Start: 2018-04-26 | End: 2018-09-26 | Stop reason: SDUPTHER

## 2018-05-09 RX ORDER — NICOTINE POLACRILEX 4 MG/1
1 GUM, CHEWING ORAL DAILY
COMMUNITY
Start: 2015-07-15 | End: 2018-10-09 | Stop reason: SDUPTHER

## 2018-05-09 RX ORDER — LISINOPRIL 20 MG/1
TABLET ORAL 2 TIMES DAILY
COMMUNITY
Start: 2015-03-12 | End: 2018-06-26 | Stop reason: SDUPTHER

## 2018-05-09 RX ORDER — OSELTAMIVIR PHOSPHATE 75 MG/1
1 CAPSULE ORAL 2 TIMES DAILY
COMMUNITY
Start: 2017-12-13 | End: 2018-05-24 | Stop reason: ALTCHOICE

## 2018-05-09 RX ORDER — COLESEVELAM 180 1/1
1 TABLET ORAL DAILY
COMMUNITY
Start: 2014-10-15 | End: 2018-12-31 | Stop reason: SDUPTHER

## 2018-05-09 RX ORDER — HYDRALAZINE HYDROCHLORIDE 100 MG/1
1 TABLET, FILM COATED ORAL 3 TIMES DAILY
COMMUNITY
End: 2018-12-21 | Stop reason: SDUPTHER

## 2018-05-09 RX ORDER — FUROSEMIDE 40 MG/1
1 TABLET ORAL 2 TIMES DAILY
COMMUNITY
Start: 2011-10-20 | End: 2018-06-26 | Stop reason: SDUPTHER

## 2018-05-14 ENCOUNTER — OFFICE VISIT (OUTPATIENT)
Dept: CARDIOLOGY CLINIC | Facility: HOSPITAL | Age: 73
End: 2018-05-14
Payer: COMMERCIAL

## 2018-05-14 VITALS
BODY MASS INDEX: 38.95 KG/M2 | SYSTOLIC BLOOD PRESSURE: 120 MMHG | DIASTOLIC BLOOD PRESSURE: 60 MMHG | HEIGHT: 69 IN | HEART RATE: 86 BPM | WEIGHT: 263 LBS

## 2018-05-14 DIAGNOSIS — I50.32 CHRONIC DIASTOLIC CHF (CONGESTIVE HEART FAILURE) (HCC): ICD-10-CM

## 2018-05-14 DIAGNOSIS — I25.10 NONOCCLUSIVE CORONARY ATHEROSCLEROSIS OF NATIVE CORONARY ARTERY: ICD-10-CM

## 2018-05-14 DIAGNOSIS — I35.0 AORTIC STENOSIS, MILD: ICD-10-CM

## 2018-05-14 DIAGNOSIS — G47.33 OBSTRUCTIVE SLEEP APNEA OF ADULT: ICD-10-CM

## 2018-05-14 DIAGNOSIS — I47.1 PAROXYSMAL ATRIAL TACHYCARDIA (HCC): ICD-10-CM

## 2018-05-14 DIAGNOSIS — I10 BENIGN ESSENTIAL HYPERTENSION: ICD-10-CM

## 2018-05-14 DIAGNOSIS — E78.5 DYSLIPIDEMIA: ICD-10-CM

## 2018-05-14 DIAGNOSIS — I48.0 PAROXYSMAL ATRIAL FIBRILLATION (HCC): ICD-10-CM

## 2018-05-14 PROCEDURE — 99214 OFFICE O/P EST MOD 30 MIN: CPT | Performed by: INTERNAL MEDICINE

## 2018-05-14 RX ORDER — ROSUVASTATIN CALCIUM 20 MG/1
20 TABLET, COATED ORAL DAILY
Qty: 30 TABLET | Refills: 11 | Status: SHIPPED | OUTPATIENT
Start: 2018-05-14 | End: 2019-03-21 | Stop reason: SDUPTHER

## 2018-05-14 NOTE — PROGRESS NOTES
Cardiology Follow Up    [address] 1936 2/05/0259  [id number]  [address] [facility] [address] [address]    1  Chronic diastolic CHF (congestive heart failure) ([address] )     2  Nonocclusive coronary atherosclerosis of native coronary artery     3  Benign essential hypertension     4  Dyslipidemia  rosuvastatin (CRESTOR) 20 MG tablet    Lipid Panel with Direct LDL reflex    Comprehensive metabolic panel   5  Paroxysmal atrial fibrillation (HCC)     6  Paroxysmal atrial tachycardia ([address] )     7  Aortic stenosis, mild     8  Obstructive sleep apnea of adult         Discussion/Summary:  Mr Lizette Saldana is a pleasant 77-year-old male who presents to the office today for routine follow-up  Since his last visit he feels well  He appears relatively euvolemic on exam  He will continue on his current diuretic regimen  I have asked him to continue to weigh himself and notify the office of a 2 to 3 lb weight gain overnight or 5 lb weight gain in one week  His blood pressure control appears improved  No changes were made to his antihypertensive medication regimen  He did attempt statin therapy but this caused nausea and thus he discontinued the medication  He is willing to reattempt therapy and I have given a prescription for rosuvastatin 20 mg daily  He will reassess his lipids and LFTs two months  He was diagnosed with severe obstructive sleep apnea  He has been intolerant of multiple types of masks and therefore remains noncompliant with CPAP therapy  His sleep apnea is certainly a contributor to his difficult to control blood pressure and PAF  He is maintaining sinus rhythm  He will remain on systemic anticoagulation with Eliquis  He will return to the office in six months for ongoing evaluation       Interval History:   Mr Lizette Saldana is a pleasant 77-year-old male who presents to the office today for routine follow-up  Since his last visit he has been feeling well  He states his lower extremity edema has greatly improved  His shortness of breath has also greatly improved  He denies any exertional chest pain  He denies any paroxysmal nocturnal dyspnea or orthopnea  He denies increasing abdominal girth  He weighs himself at home with a weight of about a 262 to 264 lb  He denies palpitations, lightheadedness, syncope or presyncope  He denies symptoms of claudication      He is maintained on Eliquis without any bleeding consequences  He remains noncompliant with CPAP  Problem List     Aortic stenosis, mild    Chronic diastolic CHF (congestive heart failure) ([facility])    Coronary artery disease    Dyslipidemia    Hypertension    Obstructive sleep apnea of adult    Paroxysmal atrial fibrillation (HCC)    Paroxysmal atrial tachycardia ([address] )        No past medical history on file  Social History     Social History    Marital status: /Civil Union     Spouse name: N/A    Number of children: N/A    Years of education: N/A     Occupational History    Not on file  Social History Main Topics    Smoking status: Former Smoker    Smokeless tobacco: Former User    Alcohol use Not on file    Drug use: Unknown    Sexual activity: Not on file     Other Topics Concern    Not on file     Social History Narrative    No narrative on file      No family history on file  No past surgical history on file  Current Outpatient Prescriptions:     apixaban (ELIQUIS) 5 mg, Take 1 tablet by mouth 2 (two) times a day, Disp: , Rfl:     colesevelam (WELCHOL) 625 mg tablet, Take 1 tablet by mouth 2 (two) times a day, Disp: , Rfl:     folic acid (FOLVITE) 1 mg tablet, TAKE (1) TABLET BY MOUTH DAILY  , Disp: 30 tablet, Rfl: 4    furosemide (LASIX) 40 mg tablet, Take 1 tablet by mouth 2 (two) times a day, Disp: , Rfl:     hydrALAZINE (APRESOLINE) 100 MG tablet, Take 1 tablet by mouth 3 (three) times a day, Disp: , Rfl:     lisinopril (ZESTRIL) 20 mg tablet, Take by mouth Twice daily, Disp: , Rfl:     losartan (COZAAR) 100 MG tablet, TAKE (1) TABLET DAILY  (Patient taking differently: taking 3 times daily), Disp: 30 tablet, Rfl: 4    Magnesium Oxide -Mg Supplement 400 MG CAPS, Take 1 tablet by mouth daily, Disp: , Rfl:     montelukast (SINGULAIR) 10 mg tablet, TAKE (1) TABLET DAILY AS DIRECTED , Disp: 30 tablet, Rfl: 4    Omeprazole 20 MG TBEC, Take 1 capsule by mouth daily, Disp: , Rfl:     predniSONE 5 mg tablet, TAKE ONE TABLET DAILY  , Disp: 30 tablet, Rfl: 4    oseltamivir (TAMIFLU) 75 mg capsule, Take 1 capsule by mouth 2 (two) times a day, Disp: , Rfl:     rosuvastatin (CRESTOR) 20 MG tablet, Take 1 tablet (20 mg total) by mouth daily, Disp: 30 tablet, Rfl: 11  No Known Allergies    Labs:     Chemistry        Component Value Date/Time     07/21/2017 0726     12/31/2015 0705    K 3 8 07/21/2017 0726    K 4 3 12/31/2015 0705     07/21/2017 0726     12/31/2015 0705    CO2 26 07/21/2017 0726    CO2 27 9 12/31/2015 0705    BUN 18 07/21/2017 0726    BUN 14 12/31/2015 0705    CREATININE 0 94 07/21/2017 0726    CREATININE 0 83 12/31/2015 0705        Component Value Date/Time    CALCIUM 8 8 07/21/2017 0726    CALCIUM 8 6 12/31/2015 0705    ALKPHOS 48 06/23/2017 0755    ALKPHOS 51 12/31/2015 0705    AST 33 06/23/2017 0755    AST 29 12/31/2015 0705    ALT 53 06/23/2017 0755    ALT 48 12/31/2015 0705    BILITOT 1 54 (H) 06/23/2017 0755    BILITOT 1 07 (H) 12/31/2015 0705            Lab Results   Component Value Date    CHOL 165 06/23/2017    CHOL 238 (H) 01/20/2017    CHOL 213 12/31/2015     Lab Results   Component Value Date    HDL 84 (H) 06/23/2017    HDL 87 (H) 01/20/2017    HDL 85 12/31/2015     Lab Results   Component Value Date    LDLCALC 64 06/23/2017    LDLCALC 122 (H) 01/20/2017    LDLCALC 112 (H) 12/31/2015     Lab Results   Component Value Date    TRIG 83 06/23/2017 TRIG 147 01/20/2017    TRIG 78 12/31/2015     No components found for: CHOLHDL    Imaging: No results found  Review of Systems   Cardiovascular: Negative for chest pain, claudication, cyanosis, dyspnea on exertion, irregular heartbeat, leg swelling, near-syncope, orthopnea, palpitations, paroxysmal nocturnal dyspnea and syncope  All other systems reviewed and are negative  Vitals:    05/14/18 1413   BP: 120/60   Pulse: 86     Vitals:    05/14/18 1413   Weight: 119 kg (263 lb)     Height: 5' 9" (175 3 cm)   Body mass index is 38 84 kg/m²      Physical Exam:   General appearance:  Appears stated age, alert, well appearing and in no distress  HEENT:  PERRLA, EOMI, no scleral icterus, no conjunctival pallor  NECK:  Supple, No elevated JVP, no thyromegaly, no carotid bruits  HEART:  Regular rate and rhythm, normal S1/S2, no S3/S4, no murmur or rub  LUNGS:  Clear to auscultation bilaterally  ABDOMEN:  Soft, non-tender, positive bowel sounds, no rebound or guarding, no organomegaly   EXTREMITIES:  Trace pitting edema  VASCULAR:  Normal pedal pulses   SKIN: No lesions or rashes on exposed skin  NEURO:  CN II-XII intact, no focal deficits

## 2018-05-24 ENCOUNTER — TELEPHONE (OUTPATIENT)
Dept: INTERNAL MEDICINE CLINIC | Facility: CLINIC | Age: 73
End: 2018-05-24

## 2018-05-24 ENCOUNTER — APPOINTMENT (EMERGENCY)
Dept: CT IMAGING | Facility: HOSPITAL | Age: 73
End: 2018-05-24
Payer: COMMERCIAL

## 2018-05-24 ENCOUNTER — HOSPITAL ENCOUNTER (EMERGENCY)
Facility: HOSPITAL | Age: 73
Discharge: HOME | End: 2018-05-24
Admitting: EMERGENCY MEDICINE
Payer: COMMERCIAL

## 2018-05-24 VITALS
HEART RATE: 64 BPM | HEIGHT: 69 IN | BODY MASS INDEX: 38.91 KG/M2 | WEIGHT: 262.7 LBS | RESPIRATION RATE: 16 BRPM | TEMPERATURE: 98.2 F | DIASTOLIC BLOOD PRESSURE: 66 MMHG | SYSTOLIC BLOOD PRESSURE: 126 MMHG | OXYGEN SATURATION: 95 %

## 2018-05-24 DIAGNOSIS — M54.50 ACUTE RIGHT-SIDED LOW BACK PAIN WITHOUT SCIATICA: Primary | ICD-10-CM

## 2018-05-24 LAB
ALBUMIN SERPL BCP-MCNC: 3.7 G/DL (ref 3.5–5)
ALP SERPL-CCNC: 47 U/L (ref 46–116)
ALT SERPL W P-5'-P-CCNC: 32 U/L (ref 12–78)
ANION GAP SERPL CALCULATED.3IONS-SCNC: 8 MMOL/L (ref 4–13)
AST SERPL W P-5'-P-CCNC: 22 U/L (ref 5–45)
BASOPHILS # BLD AUTO: 0.01 THOUSANDS/ΜL (ref 0–0.1)
BASOPHILS NFR BLD AUTO: 0 % (ref 0–1)
BILIRUB SERPL-MCNC: 1.7 MG/DL (ref 0.2–1)
BILIRUB UR QL STRIP: NEGATIVE
BUN SERPL-MCNC: 19 MG/DL (ref 5–25)
CALCIUM SERPL-MCNC: 8.2 MG/DL (ref 8.3–10.1)
CHLORIDE SERPL-SCNC: 101 MMOL/L (ref 100–108)
CK MB SERPL-MCNC: 1.4 NG/ML (ref 0–5)
CK MB SERPL-MCNC: <1 % (ref 0–2.5)
CK SERPL-CCNC: 187 U/L (ref 39–308)
CLARITY UR: CLEAR
CO2 SERPL-SCNC: 28 MMOL/L (ref 21–32)
COLOR UR: YELLOW
CREAT SERPL-MCNC: 0.94 MG/DL (ref 0.6–1.3)
EOSINOPHIL # BLD AUTO: 0.03 THOUSAND/ΜL (ref 0–0.61)
EOSINOPHIL NFR BLD AUTO: 0 % (ref 0–6)
ERYTHROCYTE [DISTWIDTH] IN BLOOD BY AUTOMATED COUNT: 12.9 % (ref 11.6–15.1)
GFR SERPL CREATININE-BSD FRML MDRD: 81 ML/MIN/1.73SQ M
GLUCOSE SERPL-MCNC: 103 MG/DL (ref 65–140)
GLUCOSE UR STRIP-MCNC: NEGATIVE MG/DL
HCT VFR BLD AUTO: 39.4 % (ref 36.5–49.3)
HGB BLD-MCNC: 13.6 G/DL (ref 12–17)
HGB UR QL STRIP.AUTO: NEGATIVE
KETONES UR STRIP-MCNC: NEGATIVE MG/DL
LEUKOCYTE ESTERASE UR QL STRIP: NEGATIVE
LYMPHOCYTES # BLD AUTO: 2.51 THOUSANDS/ΜL (ref 0.6–4.47)
LYMPHOCYTES NFR BLD AUTO: 32 % (ref 14–44)
MCH RBC QN AUTO: 28.8 PG (ref 26.8–34.3)
MCHC RBC AUTO-ENTMCNC: 34.5 G/DL (ref 31.4–37.4)
MCV RBC AUTO: 83 FL (ref 82–98)
MONOCYTES # BLD AUTO: 0.92 THOUSAND/ΜL (ref 0.17–1.22)
MONOCYTES NFR BLD AUTO: 12 % (ref 4–12)
NEUTROPHILS # BLD AUTO: 4.44 THOUSANDS/ΜL (ref 1.85–7.62)
NEUTS SEG NFR BLD AUTO: 56 % (ref 43–75)
NITRITE UR QL STRIP: NEGATIVE
PH UR STRIP.AUTO: 6 [PH] (ref 4.5–8)
PLATELET # BLD AUTO: 189 THOUSANDS/UL (ref 149–390)
PMV BLD AUTO: 9.7 FL (ref 8.9–12.7)
POTASSIUM SERPL-SCNC: 3.2 MMOL/L (ref 3.5–5.3)
PROT SERPL-MCNC: 6.9 G/DL (ref 6.4–8.2)
PROT UR STRIP-MCNC: NEGATIVE MG/DL
RBC # BLD AUTO: 4.73 MILLION/UL (ref 3.88–5.62)
SODIUM SERPL-SCNC: 137 MMOL/L (ref 136–145)
SP GR UR STRIP.AUTO: 1.01 (ref 1–1.03)
UROBILINOGEN UR QL STRIP.AUTO: 0.2 E.U./DL
WBC # BLD AUTO: 7.91 THOUSAND/UL (ref 4.31–10.16)

## 2018-05-24 PROCEDURE — 96375 TX/PRO/DX INJ NEW DRUG ADDON: CPT

## 2018-05-24 PROCEDURE — 80053 COMPREHEN METABOLIC PANEL: CPT | Performed by: PHYSICIAN ASSISTANT

## 2018-05-24 PROCEDURE — 74176 CT ABD & PELVIS W/O CONTRAST: CPT

## 2018-05-24 PROCEDURE — 82553 CREATINE MB FRACTION: CPT | Performed by: PHYSICIAN ASSISTANT

## 2018-05-24 PROCEDURE — 96374 THER/PROPH/DIAG INJ IV PUSH: CPT

## 2018-05-24 PROCEDURE — 82550 ASSAY OF CK (CPK): CPT | Performed by: PHYSICIAN ASSISTANT

## 2018-05-24 PROCEDURE — 36415 COLL VENOUS BLD VENIPUNCTURE: CPT | Performed by: PHYSICIAN ASSISTANT

## 2018-05-24 PROCEDURE — 81003 URINALYSIS AUTO W/O SCOPE: CPT | Performed by: PHYSICIAN ASSISTANT

## 2018-05-24 PROCEDURE — 99284 EMERGENCY DEPT VISIT MOD MDM: CPT

## 2018-05-24 PROCEDURE — 85025 COMPLETE CBC W/AUTO DIFF WBC: CPT | Performed by: PHYSICIAN ASSISTANT

## 2018-05-24 RX ORDER — LIDOCAINE 50 MG/G
1 PATCH TOPICAL EVERY 24 HOURS
Qty: 5 PATCH | Refills: 0 | Status: SHIPPED | OUTPATIENT
Start: 2018-05-24 | End: 2019-08-27 | Stop reason: ALTCHOICE

## 2018-05-24 RX ORDER — OXYCODONE HYDROCHLORIDE 5 MG/1
5 TABLET ORAL EVERY 6 HOURS PRN
Qty: 10 TABLET | Refills: 0 | Status: SHIPPED | OUTPATIENT
Start: 2018-05-24 | End: 2018-10-09 | Stop reason: ALTCHOICE

## 2018-05-24 RX ORDER — MORPHINE SULFATE 4 MG/ML
4 INJECTION, SOLUTION INTRAMUSCULAR; INTRAVENOUS ONCE
Status: COMPLETED | OUTPATIENT
Start: 2018-05-24 | End: 2018-05-24

## 2018-05-24 RX ORDER — ACETAMINOPHEN 325 MG/1
650 TABLET ORAL EVERY 6 HOURS PRN
Qty: 30 TABLET | Refills: 0 | Status: ON HOLD | OUTPATIENT
Start: 2018-05-24 | End: 2019-01-14 | Stop reason: ALTCHOICE

## 2018-05-24 RX ORDER — MULTIVITAMIN
1 TABLET ORAL DAILY
Status: ON HOLD | COMMUNITY
End: 2019-01-14 | Stop reason: ALTCHOICE

## 2018-05-24 RX ORDER — ONDANSETRON 2 MG/ML
4 INJECTION INTRAMUSCULAR; INTRAVENOUS ONCE
Status: COMPLETED | OUTPATIENT
Start: 2018-05-24 | End: 2018-05-24

## 2018-05-24 RX ADMIN — ONDANSETRON 4 MG: 2 INJECTION INTRAMUSCULAR; INTRAVENOUS at 14:04

## 2018-05-24 RX ADMIN — MORPHINE SULFATE 4 MG: 4 INJECTION, SOLUTION INTRAMUSCULAR; INTRAVENOUS at 14:06

## 2018-05-24 NOTE — DISCHARGE INSTRUCTIONS
Acute Low Back Pain, Ambulatory Care   GENERAL INFORMATION:   Acute low back pain  is discomfort in your lower back area that lasts for less than 12 weeks  The word acute is used to describe pain that starts suddenly, worsens quickly, and lasts for a short time  Common symptoms include the following:   · Back stiffness or spasms    · Pain down the back or side of one leg    · Holding yourself in an unusual position or posture to decrease your back pain    · Not being able to find a sitting position that is comfortable    · Slow increase in your pain for 24 to 48 hours after you stress your back    · Tenderness on your lower back or severe pain when you move your back  Seek immediate care for the following symptoms:   · Severe pain    · Sudden stiffness and heaviness in both buttocks down to both legs    · Numbness or weakness in one leg, or pain in both legs    · Numbness in your genital area or across your lower back    · Unable to control your urine or bowel movements  Treatment for acute low back pain  may include any of the following:  · Medicines:      ¨ NSAIDs  help decrease swelling and pain or fever  This medicine is available with or without a doctor's order  NSAIDs can cause stomach bleeding or kidney problems in certain people  If you take blood thinner medicine, always ask your healthcare provider if NSAIDs are safe for you  Always read the medicine label and follow directions  ¨ Muscle relaxers  help decrease muscle spasms pain  ¨ Prescription pain medicine  may be given  Ask how to take this medicine safely  · Surgery  may be needed if your pain is severe and other treatments do not work  Surgery may be needed for conditions of the lumbar spine, such as herniated disc or spinal stenosis  Manage your symptoms:   · Sleep on a firm mattress  If you do not have a firm mattress, have someone move your mattress to the floor for a few days   A piece of plywood under your mattress can also help make it firmer  · Apply ice  on your lower back for 15 to 20 minutes every hour or as directed  Use an ice pack, or put crushed ice in a plastic bag  Cover it with a towel  Ice helps prevent tissue damage and decreases swelling and pain  You can alternate ice and heat  · Apply heat  on your lower back for 20 to 30 minutes every 2 hours for as many days as directed  Heat helps decrease pain and muscle spasms  · Go to physical therapy  A physical therapist teaches you exercises to help improve movement and strength, and to decrease pain  Prevent acute low back pain:   · Use proper body mechanics  ¨ Bend at the hips and knees when you  objects  Do not bend from the waist  Use your leg muscles as you lift the load  Do not use your back  Keep the object close to your chest as you lift it  Try not to twist or lift anything above your waist     ¨ Change your position often when you stand for long periods of time  Rest one foot on a small box or footrest, and then switch to the other foot often  ¨ Try not to sit for long periods of time  When you do, sit in a straight-backed chair with your feet flat on the floor  Never reach, pull, or push while you are sitting  · Exercise regularly  Warm up before you exercise  Do exercises that strengthen your back muscles  Ask about the best exercise plan for you  · Maintain a healthy weight  Ask your healthcare provider how much you should weigh  Ask him to help you create a weight loss plan if you are overweight  Follow up with your healthcare provider as directed:  Return for a follow-up visit if you still have pain after 1 to 3 weeks of treatment  You may need to visit an orthopedist if your back pain lasts more than 6 to 12 weeks  Write down your questions so you remember to ask them during your visits  CARE AGREEMENT:   You have the right to help plan your care  Learn about your health condition and how it may be treated   Discuss treatment options with your caregivers to decide what care you want to receive  You always have the right to refuse treatment  The above information is an  only  It is not intended as medical advice for individual conditions or treatments  Talk to your doctor, nurse or pharmacist before following any medical regimen to see if it is safe and effective for you  © 2014 [address] is for End User's use only and may not be sold, redistributed or otherwise used for commercial purposes  All illustrations and images included in CareNotes® are the copyrighted property of A D [employer] M [employer]  or [employer]

## 2018-05-24 NOTE — TELEPHONE ENCOUNTER
If in that much pain would go to ER so CT scan and urin can be done and meds given as well as followup with urology if needed

## 2018-05-25 NOTE — ED PROVIDER NOTES
History  Chief Complaint   Patient presents with    Flank Pain     pt complains of right flank since last week, it did go away and then yesterday came back as a sharp shooting that is intermittent  Worsens with movement  Patient denies injury, also states no urinary problems  No N/V/D at this time       History provided by:  Patient, spouse and medical records  Flank Pain   Pain location:  R flank  Pain quality: aching and shooting    Pain radiates to:  Back  Pain severity:  Severe  Onset quality:  Gradual  Duration:  7 days  Timing:  Intermittent  Progression since onset: initially had improved/resolved for a few days, returned today  Chronicity:  New  Context: not alcohol use, not previous surgeries, not recent illness, not sick contacts and not trauma    Context comment:  Pt reports he was bending and lifting 80 lb blocks of wood last week for a few hours on 1 day  no other injury  Relieved by:  None tried  Worsened by:  Position changes  Ineffective treatments:  None tried  Associated symptoms: no chest pain, no chills, no constipation, no cough, no diarrhea, no dysuria, no fatigue, no fever, no hematuria, no nausea, no shortness of breath, no sore throat and no vomiting    Risk factors: aspirin, being elderly and obesity    Risk factors comment:  Recent new medicine added- crestor      Prior to Admission Medications   Prescriptions Last Dose Informant Patient Reported? Taking? Magnesium Oxide -Mg Supplement 400 MG CAPS   Yes Yes   Sig: Take 1 tablet by mouth daily   Multiple Vitamin (MULTIVITAMIN) tablet  Self Yes Yes   Sig: Take 1 tablet by mouth daily   Omeprazole 20 MG TBEC   Yes Yes   Sig: Take 1 capsule by mouth daily   apixaban (ELIQUIS) 5 mg   Yes Yes   Sig: Take 1 tablet by mouth 2 (two) times a day   colesevelam (WELCHOL) 625 mg tablet   Yes Yes   Sig: Take 1 tablet by mouth 2 (two) times a day   folic acid (FOLVITE) 1 mg tablet   No Yes   Sig: TAKE (1) TABLET BY MOUTH DAILY     furosemide (LASIX) 40 mg tablet   Yes Yes   Sig: Take 1 tablet by mouth 2 (two) times a day   hydrALAZINE (APRESOLINE) 100 MG tablet   Yes Yes   Sig: Take 1 tablet by mouth 3 (three) times a day   lisinopril (ZESTRIL) 20 mg tablet   Yes Yes   Sig: Take by mouth Twice daily   losartan (COZAAR) 100 MG tablet  Self No Yes   Sig: TAKE (1) TABLET DAILY  Patient taking differently: taking 3 times daily   montelukast (SINGULAIR) 10 mg tablet   No Yes   Sig: TAKE (1) TABLET DAILY AS DIRECTED  predniSONE 5 mg tablet   No Yes   Sig: TAKE ONE TABLET DAILY  rosuvastatin (CRESTOR) 20 MG tablet   No Yes   Sig: Take 1 tablet (20 mg total) by mouth daily      Facility-Administered Medications: None       Past Medical History:   Diagnosis Date    Atrial fibrillation ([facility] [address] )     Hypertension        Past Surgical History:   Procedure Laterality Date    APPENDECTOMY      BACK SURGERY      disc repair    CHOLECYSTECTOMY      COLON SURGERY      colon ressection    TONSILLECTOMY         History reviewed  No pertinent family history  I have reviewed and agree with the history as documented  Social History   Substance Use Topics    Smoking status: Former Smoker    Smokeless tobacco: Former User    Alcohol use 2 4 oz/week     4 Cans of beer per week      Comment: daily        Review of Systems   Constitutional: Negative for activity change, appetite change, chills, diaphoresis, fatigue, fever and unexpected weight change  HENT: Negative for congestion, ear pain, rhinorrhea, sinus pressure, sore throat and tinnitus  Eyes: Negative for visual disturbance  Respiratory: Negative for cough, chest tightness, shortness of breath and wheezing  Cardiovascular: Negative for chest pain, palpitations and leg swelling  Gastrointestinal: Negative for abdominal pain, blood in stool, constipation, diarrhea, nausea and vomiting  Genitourinary: Negative for dysuria, flank pain, frequency, hematuria and urgency     Musculoskeletal: Positive for back pain  Negative for arthralgias, gait problem, myalgias, neck pain and neck stiffness  Skin: Negative for color change, rash and wound  Neurological: Negative for dizziness, tremors, syncope and headaches  Physical Exam  Physical Exam   Constitutional: He is oriented to person, place, and time  Vital signs are normal  He appears well-developed and well-nourished  Non-toxic appearance  No distress  HENT:   Head: Normocephalic and atraumatic  Right Ear: Tympanic membrane and external ear normal    Left Ear: Tympanic membrane and external ear normal    Nose: Nose normal  No rhinorrhea  Mouth/Throat: Uvula is midline and oropharynx is clear and moist    Eyes: Conjunctivae, EOM and lids are normal  Pupils are equal, round, and reactive to light  Right eye exhibits no discharge  Left eye exhibits no discharge  Neck: Normal range of motion and full passive range of motion without pain  Neck supple  No JVD present  No thyromegaly present  Cardiovascular: Normal rate, regular rhythm, normal heart sounds and intact distal pulses  No murmur heard  Pulmonary/Chest: Effort normal and breath sounds normal  No accessory muscle usage  No tachypnea  No respiratory distress  He has no wheezes  He has no rales  He exhibits no tenderness  Abdominal: Soft  Normal appearance and bowel sounds are normal  He exhibits no distension  There is no hepatosplenomegaly  There is no tenderness  There is no rebound, no guarding and no CVA tenderness  No hernia  obese   Musculoskeletal: Normal range of motion  He exhibits no edema  Lumbar back: He exhibits tenderness and pain  He exhibits normal range of motion (hf/he kf/ke df/pf intact  normal gait ), no bony tenderness, no swelling, no edema, no deformity, no laceration, no spasm and normal pulse  Back:    Lymphadenopathy:     He has no cervical adenopathy  Neurological: He is alert and oriented to person, place, and time   No cranial nerve deficit or sensory deficit  Skin: Skin is warm, dry and intact  Capillary refill takes less than 2 seconds  No rash noted  He is not diaphoretic  No erythema  No pallor  Psychiatric: He has a normal mood and affect  His speech is normal and behavior is normal    Nursing note and vitals reviewed        Vital Signs  ED Triage Vitals   Temperature Pulse Respirations Blood Pressure SpO2   05/24/18 1323 05/24/18 1324 05/24/18 1324 05/24/18 1324 05/24/18 1324   98 2 °F (36 8 °C) 86 18 136/64 94 %      Temp Source Heart Rate Source Patient Position - Orthostatic VS BP Location FiO2 (%)   05/24/18 1323 05/24/18 1324 05/24/18 1324 05/24/18 1324 --   Temporal Monitor Sitting Right arm       Pain Score       05/24/18 1324       8           Vitals:    05/24/18 1324 05/24/18 1400 05/24/18 1430 05/24/18 1600   BP: 136/64 129/60 127/63 126/66   Pulse: 86 73 68 64   Patient Position - Orthostatic VS: Sitting Sitting Sitting Sitting       Visual Acuity      ED Medications  Medications   morphine (PF) 4 mg/mL injection 4 mg (4 mg Intravenous Given 5/24/18 1406)   ondansetron (ZOFRAN) injection 4 mg (4 mg Intravenous Given 5/24/18 1404)       Diagnostic Studies  Results Reviewed     Procedure Component Value Units Date/Time    CKMB [[id number]]  (Normal) Collected:  05/24/18 1403    Lab Status:  Final result Specimen:  Blood from Arm, Left Updated:  05/24/18 1453     CK-MB Index <1 0 %      CK-MB FRACTION 1 4 ng/mL     Comprehensive metabolic panel [[id number]]  (Abnormal) Collected:  05/24/18 1403    Lab Status:  Final result Specimen:  Blood from Arm, Left Updated:  05/24/18 1426     Sodium 137 mmol/L      Potassium 3 2 (L) mmol/L      Chloride 101 mmol/L      CO2 28 mmol/L      Anion Gap 8 mmol/L      BUN 19 mg/dL      Creatinine 0 94 mg/dL      Glucose 103 mg/dL      Calcium 8 2 (L) mg/dL      AST 22 U/L      ALT 32 U/L      Alkaline Phosphatase 47 U/L      Total Protein 6 9 g/dL      Albumin 3 7 g/dL      Total Bilirubin 1 70 (H) mg/dL eGFR 81 ml/min/1 73sq m     Narrative:         National Kidney Disease Education Program recommendations are as follows:  GFR calculation is accurate only with a steady state creatinine  Chronic Kidney disease less than 60 ml/min/1 73 sq  meters  Kidney failure less than 15 ml/min/1 73 sq  meters  CK (with reflex to MB) [[id number]]  (Normal) Collected:  05/24/18 1403    Lab Status:  Final result Specimen:  Blood from Arm, Left Updated:  05/24/18 1426     Total  U/L     CBC and differential [[id number]]  (Normal) Collected:  05/24/18 1403    Lab Status:  Final result Specimen:  Blood from Arm, Left Updated:  05/24/18 1415     WBC 7 91 Thousand/uL      RBC 4 73 Million/uL      Hemoglobin 13 6 g/dL      Hematocrit 39 4 %      MCV 83 fL      MCH 28 8 pg      MCHC 34 5 g/dL      RDW 12 9 %      MPV 9 7 fL      Platelets 214 Thousands/uL      Neutrophils Relative 56 %      Lymphocytes Relative 32 %      Monocytes Relative 12 %      Eosinophils Relative 0 %      Basophils Relative 0 %      Neutrophils Absolute 4 44 Thousands/µL      Lymphocytes Absolute 2 51 Thousands/µL      Monocytes Absolute 0 92 Thousand/µL      Eosinophils Absolute 0 03 Thousand/µL      Basophils Absolute 0 01 Thousands/µL     UA w Reflex to Microscopic w Reflex to Culture [[id number]] Collected:  05/24/18 1404    Lab Status:  Final result Specimen:  Urine from Urine, Clean Catch Updated:  05/24/18 1414     Color, UA Yellow     Clarity, UA Clear     Specific Gravity, UA 1 015     pH, UA 6 0     Leukocytes, UA Negative     Nitrite, UA Negative     Protein, UA Negative mg/dl      Glucose, UA Negative mg/dl      Ketones, UA Negative mg/dl      Urobilinogen, UA 0 2 E U /dl      Bilirubin, UA Negative     Blood, UA Negative                 CT renal stone study abdomen pelvis wo contrast   Final Result by Stephanie Burrows MD (05/24 1017)      No urinary calculi identified  No acute intra-abdominal abnormality seen               Workstation performed: [id number]                    Procedures  Procedures       Phone Contacts  ED Phone Contact    ED Course         MDM  Number of Diagnoses or Management Options  Acute right-sided low back pain without sciatica: new and requires workup     Amount and/or Complexity of Data Reviewed  Clinical lab tests: reviewed and ordered  Tests in the radiology section of CPT®: ordered and reviewed    Patient Progress  Patient progress: stable    CritCare Time    Disposition  Final diagnoses:   Acute right-sided low back pain without sciatica     Time reflects when diagnosis was documented in both MDM as applicable and the Disposition within this note     Time User Action Codes Description Comment    5/24/2018  3:59 PM Anaisra Sainifreda Add [M54 5] Acute right-sided low back pain without sciatica       ED Disposition     ED Disposition Condition Comment    Discharge  [facility]  discharge to home/self care      Condition at discharge: Good        Follow-up Information     Follow up With Specialties Details Why [facility], DO Internal Medicine Schedule an appointment as soon as possible for a visit Seen in ER need followup for back injury [address] [address]  [address] [facility]  [phone]            Discharge Medication List as of 5/24/2018  4:01 PM      START taking these medications    Details   acetaminophen (TYLENOL) 325 mg tablet Take 2 tablets (650 mg total) by mouth every 6 (six) hours as needed for mild pain or moderate pain (pain), Starting Thu 5/24/2018, Normal      lidocaine (LIDODERM) 5 % Place 1 patch on the skin every 24 hours for 5 days Remove & Discard patch within 12 hours or as directed by MD, Starting Thu 5/24/2018, Until Tue 5/29/2018, Normal      oxyCODONE (ROXICODONE) 5 mg immediate release tablet Take 1 tablet (5 mg total) by mouth every 6 (six) hours as needed for severe pain (breakthrough pain only) for up to 5 doses Max Daily Amount: 20 mg, Starting Thu 5/24/2018, Print CONTINUE these medications which have NOT CHANGED    Details   apixaban (ELIQUIS) 5 mg Take 1 tablet by mouth 2 (two) times a day, Starting Wed 8/23/2017, Historical Med      colesevelam (WELCHOL) 625 mg tablet Take 1 tablet by mouth 2 (two) times a day, Starting Wed 10/15/2014, Historical Med      folic acid (FOLVITE) 1 mg tablet TAKE (1) TABLET BY MOUTH DAILY , Normal      furosemide (LASIX) 40 mg tablet Take 1 tablet by mouth 2 (two) times a day, Starting Thu 10/20/2011, Historical Med      hydrALAZINE (APRESOLINE) 100 MG tablet Take 1 tablet by mouth 3 (three) times a day, Historical Med      lisinopril (ZESTRIL) 20 mg tablet Take by mouth Twice daily, Starting Thu 3/12/2015, Historical Med      losartan (COZAAR) 100 MG tablet TAKE (1) TABLET DAILY , Normal      Magnesium Oxide -Mg Supplement 400 MG CAPS Take 1 tablet by mouth daily, Historical Med      montelukast (SINGULAIR) 10 mg tablet TAKE (1) TABLET DAILY AS DIRECTED , Normal      Multiple Vitamin (MULTIVITAMIN) tablet Take 1 tablet by mouth daily, Historical Med      Omeprazole 20 MG TBEC Take 1 capsule by mouth daily, Starting Wed 7/15/2015, Historical Med      predniSONE 5 mg tablet TAKE ONE TABLET DAILY , Normal      rosuvastatin (CRESTOR) 20 MG tablet Take 1 tablet (20 mg total) by mouth daily, Starting Mon 5/14/2018, Normal           No discharge procedures on file      ED Provider  Electronically Signed by           Gia Owens PA-C  05/25/18 2776

## 2018-06-26 DIAGNOSIS — I48.0 PAF (PAROXYSMAL ATRIAL FIBRILLATION) (HCC): Primary | ICD-10-CM

## 2018-06-26 DIAGNOSIS — I50.32 CHF (CONGESTIVE HEART FAILURE), NYHA CLASS II, CHRONIC, DIASTOLIC (HCC): Primary | ICD-10-CM

## 2018-06-26 DIAGNOSIS — I10 ESSENTIAL HYPERTENSION: ICD-10-CM

## 2018-06-26 DIAGNOSIS — K21.9 GASTROESOPHAGEAL REFLUX DISEASE, ESOPHAGITIS PRESENCE NOT SPECIFIED: Primary | ICD-10-CM

## 2018-06-26 RX ORDER — OMEPRAZOLE 20 MG/1
CAPSULE, DELAYED RELEASE ORAL
Qty: 30 CAPSULE | Refills: 4 | Status: SHIPPED | OUTPATIENT
Start: 2018-06-26 | End: 2018-10-26 | Stop reason: SDUPTHER

## 2018-06-26 RX ORDER — FUROSEMIDE 40 MG/1
TABLET ORAL
Qty: 60 TABLET | Refills: 4 | Status: SHIPPED | OUTPATIENT
Start: 2018-06-26 | End: 2018-10-26 | Stop reason: SDUPTHER

## 2018-06-26 RX ORDER — LISINOPRIL 20 MG/1
TABLET ORAL
Qty: 60 TABLET | Refills: 4 | Status: SHIPPED | OUTPATIENT
Start: 2018-06-26 | End: 2018-10-26 | Stop reason: SDUPTHER

## 2018-06-27 RX ORDER — APIXABAN 5 MG/1
TABLET, FILM COATED ORAL
Qty: 60 TABLET | Refills: 4 | Status: SHIPPED | OUTPATIENT
Start: 2018-06-27 | End: 2018-11-26 | Stop reason: SDUPTHER

## 2018-07-06 ENCOUNTER — APPOINTMENT (OUTPATIENT)
Dept: LAB | Facility: MEDICAL CENTER | Age: 73
End: 2018-07-06
Payer: COMMERCIAL

## 2018-07-06 ENCOUNTER — TRANSCRIBE ORDERS (OUTPATIENT)
Dept: RADIOLOGY | Facility: MEDICAL CENTER | Age: 73
End: 2018-07-06

## 2018-07-06 DIAGNOSIS — E78.5 DYSLIPIDEMIA: ICD-10-CM

## 2018-07-06 LAB
CHOLEST SERPL-MCNC: 103 MG/DL (ref 50–200)
HDLC SERPL-MCNC: 60 MG/DL (ref 40–60)
LDLC SERPL CALC-MCNC: 30 MG/DL (ref 0–100)
TRIGL SERPL-MCNC: 65 MG/DL

## 2018-07-06 PROCEDURE — 36415 COLL VENOUS BLD VENIPUNCTURE: CPT

## 2018-07-06 PROCEDURE — 80061 LIPID PANEL: CPT

## 2018-08-23 DIAGNOSIS — I10 ESSENTIAL HYPERTENSION: ICD-10-CM

## 2018-08-23 DIAGNOSIS — I10 ESSENTIAL HYPERTENSION: Primary | ICD-10-CM

## 2018-08-23 RX ORDER — LOSARTAN POTASSIUM 100 MG/1
TABLET ORAL
Qty: 30 TABLET | Refills: 4 | Status: SHIPPED | OUTPATIENT
Start: 2018-08-23 | End: 2019-01-23 | Stop reason: SDUPTHER

## 2018-08-23 RX ORDER — NIFEDIPINE 90 MG/1
TABLET, EXTENDED RELEASE ORAL
Qty: 30 TABLET | Refills: 4 | Status: SHIPPED | OUTPATIENT
Start: 2018-08-23 | End: 2019-01-23 | Stop reason: SDUPTHER

## 2018-08-29 ENCOUNTER — TELEPHONE (OUTPATIENT)
Dept: CARDIOLOGY CLINIC | Facility: HOSPITAL | Age: 73
End: 2018-08-29

## 2018-08-29 NOTE — TELEPHONE ENCOUNTER
Call from patients wife informing that he has been having chest pain off and on  Does not currently have it  She also is concerned that his BP is running much lower than usual for him - 120/70 and that his HR seems irregular and faster than usual  Please advise  Thanks

## 2018-08-29 NOTE — TELEPHONE ENCOUNTER
He had a stress test a year ago which was normal   His blood pressure is normal   If she is concerned about his pulse I can place him on a monitor or he can come for an ECG  He can schedule follow-up to discuss these things as well       Tito Rodríguez

## 2018-08-30 NOTE — TELEPHONE ENCOUNTER
Spoke with his wife  She is asking for an appt with you  Pls let Silvina know where she can put him in your schedule   Thx

## 2018-09-26 DIAGNOSIS — M19.90 ARTHRITIS: ICD-10-CM

## 2018-09-26 DIAGNOSIS — Z91.09 ENVIRONMENTAL ALLERGIES: ICD-10-CM

## 2018-09-26 RX ORDER — FOLIC ACID 1 MG/1
TABLET ORAL
Qty: 30 TABLET | Refills: 4 | Status: SHIPPED | OUTPATIENT
Start: 2018-09-26 | End: 2019-02-23 | Stop reason: SDUPTHER

## 2018-09-26 RX ORDER — MONTELUKAST SODIUM 10 MG/1
TABLET ORAL
Qty: 30 TABLET | Refills: 4 | Status: SHIPPED | OUTPATIENT
Start: 2018-09-26 | End: 2019-04-18 | Stop reason: SDUPTHER

## 2018-09-26 RX ORDER — PREDNISONE 1 MG/1
TABLET ORAL
Qty: 30 TABLET | Refills: 4 | Status: SHIPPED | OUTPATIENT
Start: 2018-09-26 | End: 2019-02-23 | Stop reason: SDUPTHER

## 2018-09-27 ENCOUNTER — OFFICE VISIT (OUTPATIENT)
Dept: CARDIOLOGY CLINIC | Facility: HOSPITAL | Age: 73
End: 2018-09-27
Payer: COMMERCIAL

## 2018-09-27 VITALS
BODY MASS INDEX: 37.36 KG/M2 | HEIGHT: 69 IN | DIASTOLIC BLOOD PRESSURE: 68 MMHG | HEART RATE: 81 BPM | WEIGHT: 252.2 LBS | SYSTOLIC BLOOD PRESSURE: 140 MMHG

## 2018-09-27 DIAGNOSIS — I50.32 CHRONIC DIASTOLIC CHF (CONGESTIVE HEART FAILURE) (HCC): Primary | ICD-10-CM

## 2018-09-27 DIAGNOSIS — I10 BENIGN ESSENTIAL HYPERTENSION: ICD-10-CM

## 2018-09-27 DIAGNOSIS — I35.0 AORTIC STENOSIS, MILD: ICD-10-CM

## 2018-09-27 DIAGNOSIS — I25.10 NONOCCLUSIVE CORONARY ATHEROSCLEROSIS OF NATIVE CORONARY ARTERY: ICD-10-CM

## 2018-09-27 DIAGNOSIS — E78.5 DYSLIPIDEMIA: ICD-10-CM

## 2018-09-27 DIAGNOSIS — I48.0 PAROXYSMAL ATRIAL FIBRILLATION (HCC): ICD-10-CM

## 2018-09-27 DIAGNOSIS — I47.1 PAROXYSMAL ATRIAL TACHYCARDIA (HCC): ICD-10-CM

## 2018-09-27 DIAGNOSIS — G47.33 OBSTRUCTIVE SLEEP APNEA OF ADULT: ICD-10-CM

## 2018-09-27 PROCEDURE — 99214 OFFICE O/P EST MOD 30 MIN: CPT | Performed by: INTERNAL MEDICINE

## 2018-09-27 PROCEDURE — 93000 ELECTROCARDIOGRAM COMPLETE: CPT | Performed by: INTERNAL MEDICINE

## 2018-09-27 RX ORDER — SPIRONOLACTONE 25 MG/1
25 TABLET ORAL DAILY
Qty: 90 TABLET | Refills: 3 | Status: SHIPPED | OUTPATIENT
Start: 2018-09-27 | End: 2019-08-16 | Stop reason: SDUPTHER

## 2018-09-27 NOTE — PROGRESS NOTES
Cardiology Follow Up    [address] 1936 6/70/5693  [id number]  [address] [place] [address] [facility] [address] [address]    1  Chronic diastolic CHF (congestive heart failure) ([address] )     2  Nonocclusive coronary atherosclerosis of native coronary artery     3  Benign essential hypertension     4  Dyslipidemia     5  Paroxysmal atrial fibrillation (HCC)     6  Paroxysmal atrial tachycardia ([address] )     7  Aortic stenosis, mild     8  Obstructive sleep apnea of adult         Discussion/Summary:  Mr Kaylene Szymanski is a pleasant 79-year-old male who presents to the office today for routine follow-up  Since his last visit he feels well  He appears relatively euvolemic on exam  He will continue on his current diuretic regimen  I have asked him to continue to weigh himself and notify the office of a 2 to 3 lb weight gain overnight or 5 lb weight gain in one week  His blood pressure is high in the office today  It is been high when he checks it at home  I have added spironolactone 25 mg to his regimen and I will recheck his potassium and renal function in a week  He is tolerating rosuvastatin without side effects  His most recent lipids were reviewed  They are acceptable on current therapy  He was diagnosed with severe obstructive sleep apnea  He has been intolerant of multiple types of masks and therefore remains noncompliant with CPAP therapy  His sleep apnea is certainly a contributor to his difficult to control blood pressure and PAF  He is maintaining sinus rhythm  He will remain on systemic anticoagulation with Eliquis  He was noted to have PACs today on his ECG which likely account for his irregular pulse  Reassurance was provided  He will return to the office in four months for ongoing evaluation       Interval History:   Mr Kaylene Szymanski is a pleasant 79-year-old male who presents to the office today for routine follow-up  Since his last visit he has been feeling well  He states his lower extremity edema has greatly improved  His shortness of breath has also greatly improved and he has not had any recurrent issues  He denies any exertional chest pain  He denies any paroxysmal nocturnal dyspnea or orthopnea  He denies increasing abdominal girth  He weighs himself at home with a weight of about a 252 lb  He denies palpitations, lightheadedness, syncope or presyncope  He denies symptoms of claudication      He is maintained on Eliquis without any bleeding consequences  He remains noncompliant with CPAP  His wife takes his blood pressure at home  It is been consistently high and his pulse has been irregular but not rapid  Problem List     Aortic stenosis, mild    Chronic diastolic CHF (congestive heart failure) (HCC)    Coronary artery disease    Dyslipidemia    Hypertension    Obstructive sleep apnea of adult    Paroxysmal atrial fibrillation (HCC)    Paroxysmal atrial tachycardia ([facility])        Past Medical History:   Diagnosis Date    [facility] fibrillation [facility] [address] )     Hypertension     Nonocclusive coronary atherosclerosis of native coronary artery 3/26/2015     Social History     Social History    Marital status: /Civil Union     Spouse name: N/A    Number of children: N/A    Years of education: N/A     Occupational History    Not on file  Social History Main Topics    Smoking status: Former Smoker    Smokeless tobacco: Former User    Alcohol use 2 4 oz/week     4 Cans of beer per week      Comment: daily    Drug use: No    Sexual activity: Not on file     Other Topics Concern    Not on file     Social History Narrative    No narrative on file      No family history on file    Past Surgical History:   Procedure Laterality Date    APPENDECTOMY      BACK SURGERY      disc repair    CHOLECYSTECTOMY      COLON SURGERY      colon ressection    TONSILLECTOMY Current Outpatient Prescriptions:     acetaminophen (TYLENOL) 325 mg tablet, Take 2 tablets (650 mg total) by mouth every 6 (six) hours as needed for mild pain or moderate pain (pain), Disp: 30 tablet, Rfl: 0    colesevelam (WELCHOL) 625 mg tablet, Take 1 tablet by mouth 2 (two) times a day, Disp: , Rfl:     ELIQUIS 5 MG, TAKE ONE TABLET BY MOUTH TWICE A DAY , Disp: 60 tablet, Rfl: 4    folic acid (FOLVITE) 1 mg tablet, TAKE (1) TABLET BY MOUTH DAILY  , Disp: 30 tablet, Rfl: 4    furosemide (LASIX) 40 mg tablet, TAKE ONE TABLET BY MOUTH TWICE DAILY, Disp: 60 tablet, Rfl: 4    hydrALAZINE (APRESOLINE) 100 MG tablet, Take 1 tablet by mouth 3 (three) times a day, Disp: , Rfl:     lidocaine (LIDODERM) 5 %, Place 1 patch on the skin every 24 hours for 5 days Remove & Discard patch within 12 hours or as directed by MD, Disp: 5 patch, Rfl: 0    lisinopril (ZESTRIL) 20 mg tablet, TAKE (1) TABLET BY MOUTH TWICE DAILY  , Disp: 60 tablet, Rfl: 4    losartan (COZAAR) 100 MG tablet, TAKE (1) TABLET DAILY  , Disp: 30 tablet, Rfl: 4    Magnesium Oxide -Mg Supplement 400 MG CAPS, Take 1 tablet by mouth daily, Disp: , Rfl:     montelukast (SINGULAIR) 10 mg tablet, TAKE (1) TABLET DAILY AS DIRECTED , Disp: 30 tablet, Rfl: 4    Multiple Vitamin (MULTIVITAMIN) tablet, Take 1 tablet by mouth daily, Disp: , Rfl:     NIFEdipine (PROCARDIA XL) 90 mg 24 hr tablet, TAKE 1 TABLET DAILY  , Disp: 30 tablet, Rfl: 4    omeprazole (PriLOSEC) 20 mg delayed release capsule, TAKE (1) CAPSULE DAILY  , Disp: 30 capsule, Rfl: 4    Omeprazole 20 MG TBEC, Take 1 capsule by mouth daily, Disp: , Rfl:     oxyCODONE (ROXICODONE) 5 mg immediate release tablet, Take 1 tablet (5 mg total) by mouth every 6 (six) hours as needed for severe pain (breakthrough pain only) for up to 5 doses Max Daily Amount: 20 mg, Disp: 10 tablet, Rfl: 0    predniSONE 5 mg tablet, TAKE ONE TABLET DAILY  , Disp: 30 tablet, Rfl: 4    rosuvastatin (CRESTOR) 20 MG tablet, Take 1 tablet (20 mg total) by mouth daily, Disp: 30 tablet, Rfl: 11  No Known Allergies    Labs:     Chemistry        Component Value Date/Time     05/24/2018 1403     12/31/2015 0705    K 3 2 (L) 05/24/2018 1403    K 4 3 12/31/2015 0705     05/24/2018 1403     12/31/2015 0705    CO2 28 05/24/2018 1403    CO2 27 9 12/31/2015 0705    BUN 19 05/24/2018 1403    BUN 14 12/31/2015 0705    CREATININE 0 94 05/24/2018 1403    CREATININE 0 83 12/31/2015 0705        Component Value Date/Time    CALCIUM 8 2 (L) 05/24/2018 1403    CALCIUM 8 6 12/31/2015 0705    ALKPHOS 47 05/24/2018 1403    ALKPHOS 51 12/31/2015 0705    AST 22 05/24/2018 1403    AST 29 12/31/2015 0705    ALT 32 05/24/2018 1403    ALT 48 12/31/2015 0705    BILITOT 1 07 (H) 12/31/2015 0705            Lab Results   Component Value Date    CHOL 213 12/31/2015    CHOL 226 03/12/2015     Lab Results   Component Value Date    HDL 60 07/06/2018    HDL 84 (H) 06/23/2017    HDL 87 (H) 01/20/2017     Lab Results   Component Value Date    LDLCALC 30 07/06/2018    LDLCALC 64 06/23/2017    LDLCALC 122 (H) 01/20/2017     Lab Results   Component Value Date    TRIG 65 07/06/2018    TRIG 83 06/23/2017    TRIG 147 01/20/2017     No components found for: CHOLHDL    Imaging: No results found  Review of Systems   Cardiovascular: Negative for chest pain, claudication, cyanosis, dyspnea on exertion, irregular heartbeat, leg swelling, near-syncope, orthopnea, palpitations, paroxysmal nocturnal dyspnea and syncope  All other systems reviewed and are negative  There were no vitals filed for this visit  There were no vitals filed for this visit  There is no height or weight on file to calculate BMI      Physical Exam:   General appearance:  Appears stated age, alert, well appearing and in no distress  HEENT:  PERRLA, EOMI, no scleral icterus, no conjunctival pallor  NECK:  Supple, No elevated JVP, no thyromegaly, no carotid bruits  HEART: Regular rate and rhythm, normal S1/S2, no S3/S4, no murmur or rub  LUNGS:  Clear to auscultation bilaterally  ABDOMEN:  Soft, non-tender, positive bowel sounds, no rebound or guarding, no organomegaly   EXTREMITIES:  Trace pitting edema  VASCULAR:  Normal pedal pulses   SKIN: No lesions or rashes on exposed skin  NEURO:  CN II-XII intact, no focal deficits

## 2018-10-05 ENCOUNTER — APPOINTMENT (OUTPATIENT)
Dept: LAB | Facility: HOSPITAL | Age: 73
End: 2018-10-05
Attending: INTERNAL MEDICINE
Payer: COMMERCIAL

## 2018-10-05 LAB
ANION GAP SERPL CALCULATED.3IONS-SCNC: 10 MMOL/L (ref 4–13)
BUN SERPL-MCNC: 28 MG/DL (ref 5–25)
CALCIUM SERPL-MCNC: 9.1 MG/DL (ref 8.3–10.1)
CHLORIDE SERPL-SCNC: 101 MMOL/L (ref 100–108)
CO2 SERPL-SCNC: 26 MMOL/L (ref 21–32)
CREAT SERPL-MCNC: 1.15 MG/DL (ref 0.6–1.3)
GFR SERPL CREATININE-BSD FRML MDRD: 64 ML/MIN/1.73SQ M
GLUCOSE P FAST SERPL-MCNC: 133 MG/DL (ref 65–99)
POTASSIUM SERPL-SCNC: 3.9 MMOL/L (ref 3.5–5.3)
SODIUM SERPL-SCNC: 137 MMOL/L (ref 136–145)

## 2018-10-05 PROCEDURE — 80048 BASIC METABOLIC PNL TOTAL CA: CPT | Performed by: INTERNAL MEDICINE

## 2018-10-05 PROCEDURE — 36415 COLL VENOUS BLD VENIPUNCTURE: CPT | Performed by: INTERNAL MEDICINE

## 2018-10-09 ENCOUNTER — OFFICE VISIT (OUTPATIENT)
Dept: INTERNAL MEDICINE CLINIC | Facility: CLINIC | Age: 73
End: 2018-10-09
Payer: COMMERCIAL

## 2018-10-09 ENCOUNTER — TELEPHONE (OUTPATIENT)
Dept: INTERNAL MEDICINE CLINIC | Facility: CLINIC | Age: 73
End: 2018-10-09

## 2018-10-09 VITALS
OXYGEN SATURATION: 95 % | HEART RATE: 84 BPM | WEIGHT: 247 LBS | TEMPERATURE: 97.4 F | BODY MASS INDEX: 36.58 KG/M2 | HEIGHT: 69 IN

## 2018-10-09 DIAGNOSIS — R22.1 PALPABLE MASS OF NECK: Primary | ICD-10-CM

## 2018-10-09 PROBLEM — I73.9 INTERMITTENT CLAUDICATION (HCC): Status: ACTIVE | Noted: 2017-09-19

## 2018-10-09 PROBLEM — J30.1 ALLERGIC RHINITIS DUE TO POLLEN: Status: ACTIVE | Noted: 2017-09-26

## 2018-10-09 PROBLEM — N52.9 ERECTILE DYSFUNCTION: Status: ACTIVE | Noted: 2017-02-23

## 2018-10-09 PROCEDURE — 1101F PT FALLS ASSESS-DOCD LE1/YR: CPT | Performed by: INTERNAL MEDICINE

## 2018-10-09 PROCEDURE — 1036F TOBACCO NON-USER: CPT | Performed by: INTERNAL MEDICINE

## 2018-10-09 PROCEDURE — 1160F RVW MEDS BY RX/DR IN RCRD: CPT | Performed by: INTERNAL MEDICINE

## 2018-10-09 PROCEDURE — 99213 OFFICE O/P EST LOW 20 MIN: CPT | Performed by: INTERNAL MEDICINE

## 2018-10-09 RX ORDER — CLINDAMYCIN HYDROCHLORIDE 150 MG/1
300 CAPSULE ORAL 3 TIMES DAILY
Qty: 21 CAPSULE | Refills: 0 | Status: SHIPPED | OUTPATIENT
Start: 2018-10-09 | End: 2018-10-16

## 2018-10-09 NOTE — PROGRESS NOTES
Assessment/Plan:      Diagnoses and all orders for this visit:    Palpable mass of neck  -     CT soft tissue neck w contrast; Future  -     clindamycin (CLEOCIN) 150 mg capsule; Take 2 capsules (300 mg total) by mouth 3 (three) times a day for 7 days    Pt aware pending Ct scan may need ENt eval         Patient ID: Conrad Rico is a 72 y o  male  HPI   Noted swelling right side near right ear under jaw  Some discomfort  No fever  Able to eat ok but aware of this at times when swallowing  No choking or increased saliva  Pt also due for repeat colonoscopy and interested in going to [facility]    Review of Systems   Constitutional: Negative  HENT:        Palpable area under right jawline for about 2 weeks   Eyes: Negative  Respiratory: Negative  Cardiovascular: Negative  Skin: Negative  Neurological: Negative  Vitals:    10/09/18 1411   Pulse: 84   Temp: (!) 97 4 °F (36 3 °C)   TempSrc: Tympanic   SpO2: 95%   Weight: 112 kg (247 lb)   Height: 5' 9" (1 753 m)     /78         Physical Exam   Constitutional: He appears well-developed and well-nourished  No distress  HENT:   Head: Normocephalic and atraumatic  Right Ear: External ear normal    Left Ear: External ear normal    Nose: Nose normal    Mouth/Throat: Oropharynx is clear and moist  No oropharyngeal exudate  Palpable area right under jaw area of salivary gland and slight redenned area right inner gum line   Eyes: Pupils are equal, round, and reactive to light  Conjunctivae and EOM are normal  No scleral icterus  Neck: Normal range of motion  Neck supple  No JVD present  Cardiovascular: Normal rate and regular rhythm  Pulmonary/Chest: Effort normal and breath sounds normal    Lymphadenopathy:     He has no cervical adenopathy  Skin: Skin is warm and dry  No rash noted  He is not diaphoretic  No erythema  No pallor  Psychiatric: He has a normal mood and affect   His behavior is normal  Judgment and thought content normal    Nursing note and vitals reviewed

## 2018-10-09 NOTE — TELEPHONE ENCOUNTER
Pts wife states that the pt found a lump inside his throat that is bothering him  Requesting an xray or to be seen, which ever you recommend

## 2018-10-17 ENCOUNTER — HOSPITAL ENCOUNTER (OUTPATIENT)
Dept: CT IMAGING | Facility: HOSPITAL | Age: 73
Discharge: HOME | End: 2018-10-17
Attending: INTERNAL MEDICINE
Payer: COMMERCIAL

## 2018-10-17 DIAGNOSIS — R22.1 PALPABLE MASS OF NECK: ICD-10-CM

## 2018-10-17 PROCEDURE — 70491 CT SOFT TISSUE NECK W/DYE: CPT

## 2018-10-17 RX ADMIN — IOHEXOL 85 ML: 350 INJECTION, SOLUTION INTRAVENOUS at 11:37

## 2018-10-26 DIAGNOSIS — I50.32 CHF (CONGESTIVE HEART FAILURE), NYHA CLASS II, CHRONIC, DIASTOLIC (HCC): ICD-10-CM

## 2018-10-26 DIAGNOSIS — K21.9 GASTROESOPHAGEAL REFLUX DISEASE, ESOPHAGITIS PRESENCE NOT SPECIFIED: ICD-10-CM

## 2018-10-26 DIAGNOSIS — I10 ESSENTIAL HYPERTENSION: ICD-10-CM

## 2018-10-26 RX ORDER — FUROSEMIDE 40 MG/1
TABLET ORAL
Qty: 60 TABLET | Refills: 4 | Status: SHIPPED | OUTPATIENT
Start: 2018-10-26 | End: 2019-03-21 | Stop reason: SDUPTHER

## 2018-10-26 RX ORDER — OMEPRAZOLE 20 MG/1
CAPSULE, DELAYED RELEASE ORAL
Qty: 30 CAPSULE | Refills: 4 | Status: SHIPPED | OUTPATIENT
Start: 2018-10-26 | End: 2019-03-21 | Stop reason: SDUPTHER

## 2018-10-26 RX ORDER — LISINOPRIL 20 MG/1
TABLET ORAL
Qty: 60 TABLET | Refills: 4 | Status: SHIPPED | OUTPATIENT
Start: 2018-10-26 | End: 2019-03-21 | Stop reason: SDUPTHER

## 2018-11-09 NOTE — PROGRESS NOTES
Colon and Rectal Surgery   iRta Clark Sr  72 y o  male MRN [id number]  Encounter: [id number]  11/12/18 11:50 AM            Assessment: Rita Clark Sr  is a 72 y o  male who has a history of polyps for which he needed a colon resection  Plan:   He is asymptomatic  Tenzin Manifold Colonoscopy is recommended  Colonoscopy risks, not limited to bleeding, perforated colon, need for surgery, and missed lesions were discussed  Alternatives were discussed  Questions were answered  He agreed to the procedure  Subjective     Jed Taylor is here today for consult and evaluation  He is status post colon resection at [facility]  Jed Taylor is a 72 y o  male who is referred today by Dr Timothy Lemus  Historical Information   Past Medical History:   Diagnosis Date    Atrial fibrillation ([facility] [place] [address] )     Hypertension     Nonocclusive coronary atherosclerosis of native coronary artery 3/26/2015     Past Surgical History:   Procedure Laterality Date    APPENDECTOMY      BACK SURGERY      disc repair    CHOLECYSTECTOMY      COLON SURGERY      colon ressection    TONSILLECTOMY         Meds/Allergies       Current Outpatient Prescriptions:     acetaminophen (TYLENOL) 325 mg tablet, Take 2 tablets (650 mg total) by mouth every 6 (six) hours as needed for mild pain or moderate pain (pain) (Patient not taking: Reported on 9/27/2018 ), Disp: 30 tablet, Rfl: 0    colesevelam (WELCHOL) 625 mg tablet, Take 1 tablet by mouth daily Twice a week , Disp: , Rfl:     ELIQUIS 5 MG, TAKE ONE TABLET BY MOUTH TWICE A DAY , Disp: 60 tablet, Rfl: 4    folic acid (FOLVITE) 1 mg tablet, TAKE (1) TABLET BY MOUTH DAILY  , Disp: 30 tablet, Rfl: 4    furosemide (LASIX) 40 mg tablet, TAKE ONE TABLET BY MOUTH TWICE DAILY, Disp: 60 tablet, Rfl: 4    hydrALAZINE (APRESOLINE) 100 MG tablet, Take 1 tablet by mouth 3 (three) times a day, Disp: , Rfl:     lidocaine (LIDODERM) 5 %, Place 1 patch on the skin every 24 hours for 5 days Remove & Discard patch within 12 hours or as directed by MD (Patient not taking: Reported on 9/27/2018 ), Disp: 5 patch, Rfl: 0    lisinopril (ZESTRIL) 20 mg tablet, TAKE (1) TABLET BY MOUTH TWICE DAILY  , Disp: 60 tablet, Rfl: 4    losartan (COZAAR) 100 MG tablet, TAKE (1) TABLET DAILY  , Disp: 30 tablet, Rfl: 4    Magnesium Oxide -Mg Supplement 400 MG CAPS, Take 1 tablet by mouth daily, Disp: , Rfl:     montelukast (SINGULAIR) 10 mg tablet, TAKE (1) TABLET DAILY AS DIRECTED , Disp: 30 tablet, Rfl: 4    Multiple Vitamin (MULTIVITAMIN) tablet, Take 1 tablet by mouth daily, Disp: , Rfl:     NIFEdipine (PROCARDIA XL) 90 mg 24 hr tablet, TAKE 1 TABLET DAILY  , Disp: 30 tablet, Rfl: 4    omeprazole (PriLOSEC) 20 mg delayed release capsule, TAKE (1) CAPSULE DAILY  , Disp: 30 capsule, Rfl: 4    predniSONE 5 mg tablet, TAKE ONE TABLET DAILY  , Disp: 30 tablet, Rfl: 4    rosuvastatin (CRESTOR) 20 MG tablet, Take 1 tablet (20 mg total) by mouth daily, Disp: 30 tablet, Rfl: 11    spironolactone (ALDACTONE) 25 mg tablet, Take 1 tablet (25 mg total) by mouth daily, Disp: 90 tablet, Rfl: 3  No Known Allergies    Social History   History   Drug Use No     History   Smoking Status    Former Smoker   Smokeless Tobacco    Former User         No family history on file  Review of Systems   Constitutional: Negative  Respiratory: Negative  Cardiovascular: Negative  Gastrointestinal: Negative  Objective   Current Vitals: There were no vitals filed for this visit  Physical Exam   Constitutional: He is oriented to person, place, and time  He appears well-developed and well-nourished  Eyes: Conjunctivae are normal    Cardiovascular: Normal rate and regular rhythm  Pulmonary/Chest: Effort normal  No respiratory distress  He has no wheezes  He has no rales  He exhibits no tenderness  Diminished excursions and bronchial breath sounds  Abdominal: Soft  He exhibits no distension and no mass   There is no tenderness  There is no guarding  Obesity limits the exam    Neurological: He is alert and oriented to person, place, and time  Psychiatric: He has a normal mood and affect   His behavior is normal  Judgment and thought content normal

## 2018-11-12 ENCOUNTER — OFFICE VISIT (OUTPATIENT)
Dept: SURGERY | Facility: HOSPITAL | Age: 73
End: 2018-11-12
Payer: COMMERCIAL

## 2018-11-12 DIAGNOSIS — Z86.010 HISTORY OF COLON POLYPS: Primary | ICD-10-CM

## 2018-11-12 DIAGNOSIS — R22.1 PALPABLE MASS OF NECK: ICD-10-CM

## 2018-11-12 PROBLEM — Z86.0100 HISTORY OF COLON POLYPS: Status: ACTIVE | Noted: 2018-11-12

## 2018-11-12 PROCEDURE — 99243 OFF/OP CNSLTJ NEW/EST LOW 30: CPT | Performed by: COLON & RECTAL SURGERY

## 2018-11-26 DIAGNOSIS — I48.0 PAF (PAROXYSMAL ATRIAL FIBRILLATION) (HCC): ICD-10-CM

## 2018-11-26 RX ORDER — APIXABAN 5 MG/1
TABLET, FILM COATED ORAL
Qty: 60 TABLET | Refills: 0 | Status: SHIPPED | OUTPATIENT
Start: 2018-11-26 | End: 2018-12-21 | Stop reason: SDUPTHER

## 2018-12-21 DIAGNOSIS — I48.0 PAF (PAROXYSMAL ATRIAL FIBRILLATION) (HCC): ICD-10-CM

## 2018-12-21 RX ORDER — HYDRALAZINE HYDROCHLORIDE 100 MG/1
TABLET, FILM COATED ORAL
Qty: 90 TABLET | Refills: 4 | Status: SHIPPED | OUTPATIENT
Start: 2018-12-21 | End: 2019-06-19 | Stop reason: SDUPTHER

## 2018-12-21 RX ORDER — APIXABAN 5 MG/1
TABLET, FILM COATED ORAL
Qty: 60 TABLET | Refills: 4 | Status: SHIPPED | OUTPATIENT
Start: 2018-12-21 | End: 2019-07-17 | Stop reason: SDUPTHER

## 2018-12-31 DIAGNOSIS — E78.2 MIXED HYPERLIPIDEMIA: Primary | ICD-10-CM

## 2018-12-31 RX ORDER — COLESEVELAM 180 1/1
TABLET ORAL
Qty: 60 TABLET | Refills: 0 | Status: SHIPPED | OUTPATIENT
Start: 2018-12-31 | End: 2020-10-21

## 2019-01-13 ENCOUNTER — ANESTHESIA EVENT (OUTPATIENT)
Dept: PERIOP | Facility: HOSPITAL | Age: 74
End: 2019-01-13
Payer: COMMERCIAL

## 2019-01-14 ENCOUNTER — ANESTHESIA (OUTPATIENT)
Dept: PERIOP | Facility: HOSPITAL | Age: 74
End: 2019-01-14
Payer: COMMERCIAL

## 2019-01-14 ENCOUNTER — HOSPITAL ENCOUNTER (OUTPATIENT)
Facility: HOSPITAL | Age: 74
Setting detail: OUTPATIENT SURGERY
Discharge: HOME | End: 2019-01-14
Attending: COLON & RECTAL SURGERY | Admitting: COLON & RECTAL SURGERY
Payer: COMMERCIAL

## 2019-01-14 VITALS
HEART RATE: 66 BPM | SYSTOLIC BLOOD PRESSURE: 120 MMHG | RESPIRATION RATE: 16 BRPM | OXYGEN SATURATION: 99 % | TEMPERATURE: 97.8 F | DIASTOLIC BLOOD PRESSURE: 53 MMHG

## 2019-01-14 DIAGNOSIS — Z86.010 HISTORY OF COLON POLYPS: ICD-10-CM

## 2019-01-14 PROCEDURE — 88305 TISSUE EXAM BY PATHOLOGIST: CPT | Performed by: PATHOLOGY

## 2019-01-14 PROCEDURE — 45385 COLONOSCOPY W/LESION REMOVAL: CPT | Performed by: COLON & RECTAL SURGERY

## 2019-01-14 RX ORDER — SODIUM CHLORIDE, SODIUM LACTATE, POTASSIUM CHLORIDE, CALCIUM CHLORIDE 600; 310; 30; 20 MG/100ML; MG/100ML; MG/100ML; MG/100ML
100 INJECTION, SOLUTION INTRAVENOUS CONTINUOUS
Status: DISCONTINUED | OUTPATIENT
Start: 2019-01-14 | End: 2019-01-14

## 2019-01-14 RX ORDER — SODIUM CHLORIDE, SODIUM LACTATE, POTASSIUM CHLORIDE, CALCIUM CHLORIDE 600; 310; 30; 20 MG/100ML; MG/100ML; MG/100ML; MG/100ML
125 INJECTION, SOLUTION INTRAVENOUS CONTINUOUS
Status: DISCONTINUED | OUTPATIENT
Start: 2019-01-14 | End: 2019-01-14 | Stop reason: HOSPADM

## 2019-01-14 RX ORDER — LIDOCAINE HYDROCHLORIDE 10 MG/ML
INJECTION, SOLUTION INFILTRATION; PERINEURAL AS NEEDED
Status: DISCONTINUED | OUTPATIENT
Start: 2019-01-14 | End: 2019-01-14 | Stop reason: SURG

## 2019-01-14 RX ORDER — PROPOFOL 10 MG/ML
INJECTION, EMULSION INTRAVENOUS AS NEEDED
Status: DISCONTINUED | OUTPATIENT
Start: 2019-01-14 | End: 2019-01-14 | Stop reason: SURG

## 2019-01-14 RX ADMIN — SODIUM CHLORIDE, SODIUM LACTATE, POTASSIUM CHLORIDE, AND CALCIUM CHLORIDE 100 ML/HR: .6; .31; .03; .02 INJECTION, SOLUTION INTRAVENOUS at 12:53

## 2019-01-14 RX ADMIN — PROPOFOL 20 MG: 10 INJECTION, EMULSION INTRAVENOUS at 14:35

## 2019-01-14 RX ADMIN — PROPOFOL 20 MG: 10 INJECTION, EMULSION INTRAVENOUS at 14:31

## 2019-01-14 RX ADMIN — PROPOFOL 20 MG: 10 INJECTION, EMULSION INTRAVENOUS at 14:33

## 2019-01-14 RX ADMIN — SODIUM CHLORIDE, SODIUM LACTATE, POTASSIUM CHLORIDE, AND CALCIUM CHLORIDE: .6; .31; .03; .02 INJECTION, SOLUTION INTRAVENOUS at 13:36

## 2019-01-14 RX ADMIN — LIDOCAINE HYDROCHLORIDE 20 MG: 10 INJECTION, SOLUTION INFILTRATION; PERINEURAL at 14:20

## 2019-01-14 RX ADMIN — PROPOFOL 20 MG: 10 INJECTION, EMULSION INTRAVENOUS at 14:37

## 2019-01-14 RX ADMIN — PROPOFOL 20 MG: 10 INJECTION, EMULSION INTRAVENOUS at 14:27

## 2019-01-14 RX ADMIN — PROPOFOL 20 MG: 10 INJECTION, EMULSION INTRAVENOUS at 14:29

## 2019-01-14 RX ADMIN — PROPOFOL 40 MG: 10 INJECTION, EMULSION INTRAVENOUS at 14:26

## 2019-01-14 RX ADMIN — PROPOFOL 20 MG: 10 INJECTION, EMULSION INTRAVENOUS at 14:38

## 2019-01-14 RX ADMIN — PROPOFOL 20 MG: 10 INJECTION, EMULSION INTRAVENOUS at 14:24

## 2019-01-14 RX ADMIN — PROPOFOL 40 MG: 10 INJECTION, EMULSION INTRAVENOUS at 14:22

## 2019-01-14 RX ADMIN — PROPOFOL 80 MG: 10 INJECTION, EMULSION INTRAVENOUS at 14:20

## 2019-01-14 RX ADMIN — PROPOFOL 30 MG: 10 INJECTION, EMULSION INTRAVENOUS at 14:36

## 2019-01-14 NOTE — ANESTHESIA PREPROCEDURE EVALUATION
Review of Systems/Medical History  Patient summary reviewed    No history of anesthetic complications     Cardiovascular  EKG reviewed, Hypertension controlled, CAD , Dysrhythmias , atrial fibrillation,   Comment: 04/12/17  SUMMARY     PROCEDURE INFORMATION:  This was a technically difficult study      LEFT VENTRICLE:  Size was normal   Systolic function was normal  Ejection fraction was estimated to be 65 %  Although no diagnostic regional wall motion abnormality was identified, this possibility cannot be completely excluded on the basis of this study  Wall thickness was increased  There was mild concentric hypertrophy      LEFT ATRIUM:  The atrium was mildly dilated      MITRAL VALVE:  There was mild annular calcification  There was mild regurgitation      AORTIC VALVE:  The valve was probably trileaflet  There was probably mild stenosis  Peak gradient 22 7 mmHg, mean gradient 10 9 mmHg  There was no regurgitation  The valve was not well visualized      TRICUSPID VALVE:  There was mild regurgitation  Estimated peak PA pressure was 35 mmHg   ,  Pulmonary  Sleep apnea Sleep Study completed,        GI/Hepatic    GERD well controlled, Bowel prep            Endo/Other    Obesity    GYN       Hematology   Musculoskeletal  Rheumatoid arthritis ,   Arthritis     Neurology  Negative neurology ROS      Psychology   Negative psychology ROS              Physical Exam    Airway    Mallampati score: I  TM Distance: >3 FB  Neck ROM: full     Dental   Comment: Multiple missing without significant loose ,     Cardiovascular  Rhythm: regular, Rate: normal,     Pulmonary  Breath sounds clear to auscultation,     Other Findings        Anesthesia Plan  ASA Score- 3     Anesthesia Type- IV sedation with anesthesia with ASA Monitors  Additional Monitors:   Airway Plan:         Plan Factors-  Patient did not smoke on day of surgery  Induction- intravenous      Postoperative Plan-     Informed Consent- Anesthetic plan and risks discussed with patient  I personally reviewed this patient with the CRNA  Discussed and agreed on the Anesthesia Plan with the ELFEGO Shea

## 2019-01-14 NOTE — ANESTHESIA POSTPROCEDURE EVALUATION
Post-Op Assessment Note      CV Status:  Stable    Mental Status:  Alert and awake    Hydration Status:  Euvolemic    PONV Controlled:  Controlled    Airway Patency:  Patent    Post Op Vitals Reviewed: Yes          Staff: Anesthesiologist, CRNA       Comments: Awake, reflexes intact, maintains own airway            BP 93/51 (01/14/19 1448)    Temp 98 6 °F (37 °C) (01/14/19 1448)    Pulse 68 (01/14/19 1448)   Resp 16 (01/14/19 1448)    SpO2 97 % (01/14/19 1448)

## 2019-01-14 NOTE — DISCHARGE INSTR - AVS FIRST PAGE
OPERATIVE REPORT  PATIENT NAME: Brigido Velazquez Sr     :  1945  MRN: [id number]  Pt Location: [facility] OR ROOM 03    SURGERY DATE: 2019    Surgeon(s) and Role:     Manny Monge MD - Primary    Preop Diagnosis:  History of colon polyps [Z86 010]    Post-Op Diagnosis Codes:     * History of colon polyps [Z86 010]  Recurrent polyp    Procedure(s) (LRB):  COLONOSCOPY (N/A)    Specimen(s):  ID Type Source Tests Collected by Time Destination   1 : polyp retrieved by cold snare Tissue Large Intestine, Transverse Colon TISSUE EXAM Vipin Alford MD 2019 1433        Estimated Blood Loss:   Minimal    Drains:       Anesthesia Type:   IV Sedation with Anesthesia    Operative Indications:  History of colon polyps [D72 200]    Complications:   None    Procedure and Technique:  Digital rectal exam was unremarkable  No prostate nodules were noted  The colonoscope was inserted and advanced to the cecum with ease  Upon withdrawal of the scope, a single transverse colon polyp was identified  This 1 5 cm polyp was sessile and was removed with cold snare technique in 3 pieces  The base was completely free of polypoid tissue on the conclusion of the procedure  Mild sigmoid diverticulosis was present  A mid sigmoid colon anastomosis was unremarkable  The study was otherwise unremarkable      I was present for the entire procedure    Patient Disposition:  PACU      Plan: Colonoscopy in 2 years    SIGNATURE: Vipin Alford MD  DATE: 2019  TIME: 2:40 PM

## 2019-01-14 NOTE — H&P
History and Physical   Colon and Rectal Surgery   Chadwick Spence Sr  72 y o  male MRN: [id number]  Unit/Bed#: OR POOL Encounter: [id number]  01/14/19   1:03 PM      CC: History of colon polyps  History of Present Illness   HPI:  Chadwick Spence Sr  is a 72 y o  male with no GI symptoms presents for surveillance of the colon  Historical Information   Past Medical History:   Diagnosis Date    At[facility] fibrillation ([facility] [address] [facility]     Hypertension     Nonocclusive coronary atherosclerosis of native coronary artery 3/26/2015     Past Surgical History:   Procedure Laterality Date    APPENDECTOMY      BACK SURGERY      disc repair    CHOLECYSTECTOMY      COLON SURGERY      colon ressection    TONSILLECTOMY         Meds/Allergies     Prescriptions Prior to Admission   Medication    colesevelam (WELCHOL) 625 mg tablet    folic acid (FOLVITE) 1 mg tablet    furosemide (LASIX) 40 mg tablet    hydrALAZINE (APRESOLINE) 100 MG tablet    lisinopril (ZESTRIL) 20 mg tablet    losartan (COZAAR) 100 MG tablet    Magnesium Oxide -Mg Supplement 400 MG CAPS    montelukast (SINGULAIR) 10 mg tablet    NIFEdipine (PROCARDIA XL) 90 mg 24 hr tablet    omeprazole (PriLOSEC) 20 mg delayed release capsule    predniSONE 5 mg tablet    rosuvastatin (CRESTOR) 20 MG tablet    spironolactone (ALDACTONE) 25 mg tablet    ELIQUIS 5 MG    lidocaine (LIDODERM) 5 %         Current Facility-Administered Medications:     lactated ringers infusion, 100 mL/hr, Intravenous, Continuous, Lauren Dinh CRNA, Last Rate: 100 mL/hr at 01/14/19 1253, 100 mL/hr at 01/14/19 1253    No Known Allergies      Social History   History   Alcohol Use    2 4 oz/week    4 Cans of beer per week     Comment: daily     History   Drug Use No     History   Smoking Status    Former Smoker   Smokeless Tobacco    Former User         Family History: History reviewed  No pertinent family history        Objective     Current Vitals:   Blood Pressure: 132/61 (01/14/19 1244)  Pulse: 100 (01/14/19 1244)  Temperature: (!) 97 1 °F (36 2 °C) (01/14/19 1244)  Temp Source: Tympanic (01/14/19 1244)  Respirations: 18 (01/14/19 1244)  SpO2: 96 % (01/14/19 1244)  No intake or output data in the 24 hours ending 01/14/19 1303    Physical Exam:  General:  Well nourished, no distress  Neuro: Alert and oriented  Eyes:Sclera anicteric, conjunctiva pink  Pulm: Diminished breath excursions  CTA  CV:  Regular rate and rhythm  No murmurs  Abdomen:  Soft, flat, non-tender, without masses or hepatosplenomegaly  Lab Results:       ASSESSMENT:  Andriy Hirsch  is a 72 y o  male for surveillance of colon polyps  PLAN:  Colonoscopy  Risks , including, but not limited to, bleeding, perforation, missed lesions, and potential need for surgery, were reviewed  Alternatives to colonoscopy were discussed    Yanique العراقي MD

## 2019-01-14 NOTE — OP NOTE
OPERATIVE REPORT  PATIENT NAME: Rich Marie Sr     :  1945  MRN: [id number]  Pt Location: [facility] OR ROOM 03    SURGERY DATE: 2019    Surgeon(s) and Role:     Lexie Ely MD - Primary    Preop Diagnosis:  History of colon polyps [Z86 010]    Post-Op Diagnosis Codes:     * History of colon polyps [Z86 010]  Recurrent polyp    Procedure(s) (LRB):  COLONOSCOPY (N/A)    Specimen(s):  ID Type Source Tests Collected by Time Destination   1 : polyp retrieved by cold snare Tissue Large Intestine, Transverse Colon TISSUE EXAM Adriane Godfrey MD 2019 1433        Estimated Blood Loss:   Minimal    Drains:       Anesthesia Type:   IV Sedation with Anesthesia    Operative Indications:  History of colon polyps [S52 508]    Complications:   None    Procedure and Technique:  Digital rectal exam was unremarkable  No prostate nodules were noted  The colonoscope was inserted and advanced to the cecum with ease  Upon withdrawal of the scope, a single transverse colon polyp was identified  This 1 5 cm polyp was sessile and was removed with cold snare technique in 3 pieces  The base was completely free of polypoid tissue on the conclusion of the procedure  Mild sigmoid diverticulosis was present  A mid sigmoid colon anastomosis was unremarkable  The study was otherwise unremarkable      I was present for the entire procedure    Patient Disposition:  PACU      Plan: Colonoscopy in 2 years    SIGNATURE: Adriane Godfrey MD  DATE: 2019  TIME: 2:40 PM

## 2019-01-22 ENCOUNTER — OFFICE VISIT (OUTPATIENT)
Dept: CARDIOLOGY CLINIC | Facility: HOSPITAL | Age: 74
End: 2019-01-22
Payer: COMMERCIAL

## 2019-01-22 VITALS
HEART RATE: 86 BPM | BODY MASS INDEX: 37.18 KG/M2 | DIASTOLIC BLOOD PRESSURE: 58 MMHG | HEIGHT: 69 IN | WEIGHT: 251 LBS | SYSTOLIC BLOOD PRESSURE: 120 MMHG

## 2019-01-22 DIAGNOSIS — I47.1 PAROXYSMAL ATRIAL TACHYCARDIA (HCC): ICD-10-CM

## 2019-01-22 DIAGNOSIS — G47.33 OBSTRUCTIVE SLEEP APNEA OF ADULT: ICD-10-CM

## 2019-01-22 DIAGNOSIS — I35.0 AORTIC STENOSIS, MILD: ICD-10-CM

## 2019-01-22 DIAGNOSIS — I50.32 CHRONIC DIASTOLIC CHF (CONGESTIVE HEART FAILURE) (HCC): Primary | ICD-10-CM

## 2019-01-22 DIAGNOSIS — I10 BENIGN ESSENTIAL HYPERTENSION: ICD-10-CM

## 2019-01-22 DIAGNOSIS — I25.10 NONOCCLUSIVE CORONARY ATHEROSCLEROSIS OF NATIVE CORONARY ARTERY: ICD-10-CM

## 2019-01-22 DIAGNOSIS — I48.0 PAROXYSMAL ATRIAL FIBRILLATION (HCC): ICD-10-CM

## 2019-01-22 DIAGNOSIS — E78.5 DYSLIPIDEMIA: ICD-10-CM

## 2019-01-22 PROCEDURE — 99214 OFFICE O/P EST MOD 30 MIN: CPT | Performed by: INTERNAL MEDICINE

## 2019-01-22 NOTE — PROGRESS NOTES
Cardiology Follow Up    [address] 1936 9/98/9043  [id number]  [address] [facility] [address] [address]    1  Chronic diastolic CHF (congestive heart failure) ([address] )     2  Nonocclusive coronary atherosclerosis of native coronary artery     3  Paroxysmal atrial fibrillation (HCC)     4  Paroxysmal atrial tachycardia ([address] )     5  Benign essential hypertension     6  Dyslipidemia  Lipid Panel with Direct LDL reflex    Comprehensive metabolic panel   7  Aortic stenosis, mild     8  Obstructive sleep apnea of adult         Discussion/Summary:  Mr Madiha Saunders is a pleasant 61-year-old male who presents to the office today for routine follow-up  Since his last visit he feels well  He appears euvolemic on exam  He will continue on his current diuretic regimen  I have asked him to continue to weigh himself and notify the office of a 2 to 3 lb weight gain overnight or 5 lb weight gain in one week  His blood pressure has improved after the addition of spironolactone  No changes were made to his antihypertensive medication regimen  He is tolerating rosuvastatin without side effects  His most recent lipids were reviewed  They are acceptable on current therapy  I will reassess these prior to his next visit  He was diagnosed with severe obstructive sleep apnea  He has been intolerant of multiple types of masks and therefore remains noncompliant with CPAP therapy  He is maintaining sinus rhythm  He will remain on his current AV edward blocking regimen and systemic anticoagulation with Eliquis  He will return to the office in six months for ongoing evaluation  Interval History:   Mr Madiha Saunders is a pleasant 61-year-old male who presents to the office today for routine follow-up  Since his last visit he has been feeling well    He is sedentary but has not noted any recurrent shortness of breath or exertional chest pain  He denies any paroxysmal nocturnal dyspnea or orthopnea  He denies increasing abdominal girth  He weighs himself at home with a weight of about a 252 to 256 lb  He denies palpitations, lightheadedness, syncope or presyncope  He denies symptoms of claudication      He is maintained on Eliquis without any bleeding consequences  He remains non-compliant with CPAP  Problem List     Aortic stenosis, mild    Chronic diastolic CHF (congestive heart failure) (HCC)    Coronary artery disease    Dyslipidemia    Hypertension    Obstructive sleep apnea of adult    Paroxysmal atrial fibrillation (HCC)    Paroxysmal atrial tachycardia (HCC)        Past Medical History:   Diagnosis Date    [facility] [address] [facility]     Hypertension     Nonocclusive coronary atherosclerosis of native coronary artery 3/26/2015     Social History     Social History    Marital status: /Civil Union     Spouse name: N/A    Number of children: N/A    Years of education: N/A     Occupational History    Not on file  Social History Main Topics    Smoking status: Former Smoker    Smokeless tobacco: Former User    Alcohol use 2 4 oz/week     4 Cans of beer per week      Comment: daily    Drug use: No    Sexual activity: Not on file     Other Topics Concern    Not on file     Social History Narrative    No narrative on file      No family history on file    Past Surgical History:   Procedure Laterality Date    APPENDECTOMY      BACK SURGERY      disc repair    CHOLECYSTECTOMY      COLON SURGERY      colon ressection    COLONOSCOPY N/A 1/14/2019    Procedure: COLONOSCOPY;  Surgeon: Tiffany Alves MD;  Location: [facility] MAIN OR;  Service: Colorectal    TONSILLECTOMY         Current Outpatient Prescriptions:     colesevelam (WELCHOL) 625 mg tablet, TAKE ONE TABLET BY MOUTH TWICE A DAY , Disp: 60 tablet, Rfl: 0    ELIQUIS 5 MG, TAKE ONE TABLET BY MOUTH TWICE A DAY , Disp: 61 tablet, Rfl: 4    folic acid (FOLVITE) 1 mg tablet, TAKE (1) TABLET BY MOUTH DAILY  , Disp: 30 tablet, Rfl: 4    furosemide (LASIX) 40 mg tablet, TAKE ONE TABLET BY MOUTH TWICE DAILY, Disp: 60 tablet, Rfl: 4    hydrALAZINE (APRESOLINE) 100 MG tablet, TAKE 1 TABLET 3 times daily, Disp: 90 tablet, Rfl: 4    lisinopril (ZESTRIL) 20 mg tablet, TAKE (1) TABLET BY MOUTH TWICE DAILY  , Disp: 60 tablet, Rfl: 4    losartan (COZAAR) 100 MG tablet, TAKE (1) TABLET DAILY  , Disp: 30 tablet, Rfl: 4    Magnesium Oxide -Mg Supplement 400 MG CAPS, Take 1 tablet by mouth daily, Disp: , Rfl:     montelukast (SINGULAIR) 10 mg tablet, TAKE (1) TABLET DAILY AS DIRECTED , Disp: 30 tablet, Rfl: 4    NIFEdipine (PROCARDIA XL) 90 mg 24 hr tablet, TAKE 1 TABLET DAILY  , Disp: 30 tablet, Rfl: 4    omeprazole (PriLOSEC) 20 mg delayed release capsule, TAKE (1) CAPSULE DAILY  , Disp: 30 capsule, Rfl: 4    predniSONE 5 mg tablet, TAKE ONE TABLET DAILY  , Disp: 30 tablet, Rfl: 4    rosuvastatin (CRESTOR) 20 MG tablet, Take 1 tablet (20 mg total) by mouth daily, Disp: 30 tablet, Rfl: 11    spironolactone (ALDACTONE) 25 mg tablet, Take 1 tablet (25 mg total) by mouth daily, Disp: 90 tablet, Rfl: 3    lidocaine (LIDODERM) 5 %, Place 1 patch on the skin every 24 hours for 5 days Remove & Discard patch within 12 hours or as directed by MD (Patient not taking: Reported on 9/27/2018 ), Disp: 5 patch, Rfl: 0  No Known Allergies    Labs:     Chemistry        Component Value Date/Time     12/31/2015 0705    K 3 9 10/05/2018 0827    K 4 3 12/31/2015 0705     10/05/2018 0827     12/31/2015 0705    CO2 26 10/05/2018 0827    CO2 27 9 12/31/2015 0705    BUN 28 (H) 10/05/2018 0827    BUN 14 12/31/2015 0705    CREATININE 1 15 10/05/2018 0827    CREATININE 0 83 12/31/2015 0705        Component Value Date/Time    CALCIUM 9 1 10/05/2018 0827    CALCIUM 8 6 12/31/2015 0705    ALKPHOS 47 05/24/2018 1403    ALKPHOS 51 12/31/2015 0705    AST 22 05/24/2018 1403    AST 29 12/31/2015 0705    ALT 32 05/24/2018 1403    ALT 48 12/31/2015 0705    BILITOT 1 07 (H) 12/31/2015 0705            Lab Results   Component Value Date    CHOL 213 12/31/2015    CHOL 226 03/12/2015     Lab Results   Component Value Date    HDL 60 07/06/2018    HDL 84 (H) 06/23/2017    HDL 87 (H) 01/20/2017     Lab Results   Component Value Date    LDLCALC 30 07/06/2018    LDLCALC 64 06/23/2017    LDLCALC 122 (H) 01/20/2017     Lab Results   Component Value Date    TRIG 65 07/06/2018    TRIG 83 06/23/2017    TRIG 147 01/20/2017     No results found for: CHOLHDL    Imaging: No results found  Review of Systems   Cardiovascular: Negative for chest pain, claudication, cyanosis, dyspnea on exertion, irregular heartbeat, leg swelling, near-syncope, orthopnea, palpitations, paroxysmal nocturnal dyspnea and syncope  All other systems reviewed and are negative  Vitals:    01/22/19 1411   BP: 120/58   Pulse: 86     Vitals:    01/22/19 1411   Weight: 114 kg (251 lb)     Height: 5' 9" (175 3 cm)   Body mass index is 37 07 kg/m²      Physical Exam:   General appearance:  Appears stated age, alert, well appearing and in no distress  HEENT:  PERRLA, EOMI, no scleral icterus, no conjunctival pallor  NECK:  Supple, No elevated JVP, no thyromegaly, no carotid bruits  HEART:  Regular rate and rhythm, normal S1/S2, 2/6 early peaking CARISSA RUSB with radiation to carotids  LUNGS:  Clear to auscultation bilaterally  ABDOMEN:  Soft, non-tender, positive bowel sounds, no rebound or guarding, no organomegaly   EXTREMITIES:  No edema   VASCULAR:  Normal pedal pulses   SKIN: No lesions or rashes on exposed skin  NEURO:  CN II-XII intact, no focal deficits

## 2019-01-23 DIAGNOSIS — I10 ESSENTIAL HYPERTENSION: ICD-10-CM

## 2019-01-23 RX ORDER — NIFEDIPINE 90 MG/1
TABLET, EXTENDED RELEASE ORAL
Qty: 30 TABLET | Refills: 4 | Status: SHIPPED | OUTPATIENT
Start: 2019-01-23 | End: 2019-06-19 | Stop reason: SDUPTHER

## 2019-01-23 NOTE — TELEPHONE ENCOUNTER
Double check with pharmacy but I think ew got notice on his rx that the last one was recalled  Not sure if we since changed but may want to see what other option ?benicar 40 is covered

## 2019-01-24 RX ORDER — LOSARTAN POTASSIUM 100 MG/1
TABLET ORAL
Qty: 30 TABLET | Refills: 4 | Status: SHIPPED | OUTPATIENT
Start: 2019-01-24 | End: 2019-06-19 | Stop reason: SDUPTHER

## 2019-02-23 DIAGNOSIS — M19.90 ARTHRITIS: ICD-10-CM

## 2019-02-23 RX ORDER — FOLIC ACID 1 MG/1
TABLET ORAL
Qty: 30 TABLET | Refills: 4 | Status: SHIPPED | OUTPATIENT
Start: 2019-02-23 | End: 2019-07-17 | Stop reason: SDUPTHER

## 2019-02-23 RX ORDER — PREDNISONE 1 MG/1
TABLET ORAL
Qty: 30 TABLET | Refills: 4 | Status: SHIPPED | OUTPATIENT
Start: 2019-02-23 | End: 2019-07-17 | Stop reason: SDUPTHER

## 2019-03-21 DIAGNOSIS — I10 ESSENTIAL HYPERTENSION: ICD-10-CM

## 2019-03-21 DIAGNOSIS — K21.9 GASTROESOPHAGEAL REFLUX DISEASE, ESOPHAGITIS PRESENCE NOT SPECIFIED: ICD-10-CM

## 2019-03-21 DIAGNOSIS — I50.32 CHF (CONGESTIVE HEART FAILURE), NYHA CLASS II, CHRONIC, DIASTOLIC (HCC): ICD-10-CM

## 2019-03-21 DIAGNOSIS — E78.5 DYSLIPIDEMIA: ICD-10-CM

## 2019-03-21 RX ORDER — OMEPRAZOLE 20 MG/1
CAPSULE, DELAYED RELEASE ORAL
Qty: 30 CAPSULE | Refills: 3 | Status: SHIPPED | OUTPATIENT
Start: 2019-03-21 | End: 2019-07-17 | Stop reason: SDUPTHER

## 2019-03-21 RX ORDER — ROSUVASTATIN CALCIUM 20 MG/1
TABLET, COATED ORAL
Qty: 30 TABLET | Refills: 3 | Status: SHIPPED | OUTPATIENT
Start: 2019-03-21 | End: 2019-07-17 | Stop reason: SDUPTHER

## 2019-03-21 RX ORDER — LISINOPRIL 20 MG/1
TABLET ORAL
Qty: 60 TABLET | Refills: 4 | Status: SHIPPED | OUTPATIENT
Start: 2019-03-21 | End: 2019-08-16 | Stop reason: SDUPTHER

## 2019-03-21 RX ORDER — FUROSEMIDE 40 MG/1
TABLET ORAL
Qty: 60 TABLET | Refills: 4 | Status: SHIPPED | OUTPATIENT
Start: 2019-03-21 | End: 2019-08-16 | Stop reason: SDUPTHER

## 2019-04-18 DIAGNOSIS — Z91.09 ENVIRONMENTAL ALLERGIES: ICD-10-CM

## 2019-04-18 RX ORDER — MONTELUKAST SODIUM 10 MG/1
TABLET ORAL
Qty: 30 TABLET | Refills: 4 | Status: SHIPPED | OUTPATIENT
Start: 2019-04-18 | End: 2020-03-03 | Stop reason: ALTCHOICE

## 2019-06-19 ENCOUNTER — OFFICE VISIT (OUTPATIENT)
Dept: INTERNAL MEDICINE CLINIC | Facility: CLINIC | Age: 74
End: 2019-06-19
Payer: COMMERCIAL

## 2019-06-19 VITALS
BODY MASS INDEX: 36.64 KG/M2 | SYSTOLIC BLOOD PRESSURE: 128 MMHG | HEART RATE: 80 BPM | DIASTOLIC BLOOD PRESSURE: 78 MMHG | HEIGHT: 69 IN | WEIGHT: 247.38 LBS | TEMPERATURE: 97.1 F | OXYGEN SATURATION: 99 %

## 2019-06-19 DIAGNOSIS — R73.9 HYPERGLYCEMIA: ICD-10-CM

## 2019-06-19 DIAGNOSIS — I48.0 PAF (PAROXYSMAL ATRIAL FIBRILLATION) (HCC): ICD-10-CM

## 2019-06-19 DIAGNOSIS — E78.2 MIXED HYPERLIPIDEMIA: ICD-10-CM

## 2019-06-19 DIAGNOSIS — I10 ESSENTIAL HYPERTENSION: ICD-10-CM

## 2019-06-19 DIAGNOSIS — M25.552 PAIN OF LEFT HIP JOINT: ICD-10-CM

## 2019-06-19 DIAGNOSIS — G62.9 NEUROPATHY: ICD-10-CM

## 2019-06-19 DIAGNOSIS — Z11.59 NEED FOR HEPATITIS C SCREENING TEST: ICD-10-CM

## 2019-06-19 DIAGNOSIS — M05.79 RHEUMATOID ARTHRITIS INVOLVING MULTIPLE SITES WITH POSITIVE RHEUMATOID FACTOR (HCC): Primary | ICD-10-CM

## 2019-06-19 PROCEDURE — 1160F RVW MEDS BY RX/DR IN RCRD: CPT | Performed by: INTERNAL MEDICINE

## 2019-06-19 PROCEDURE — 3008F BODY MASS INDEX DOCD: CPT | Performed by: INTERNAL MEDICINE

## 2019-06-19 PROCEDURE — 99214 OFFICE O/P EST MOD 30 MIN: CPT | Performed by: INTERNAL MEDICINE

## 2019-06-19 PROCEDURE — 1036F TOBACCO NON-USER: CPT | Performed by: INTERNAL MEDICINE

## 2019-06-19 RX ORDER — LOSARTAN POTASSIUM 100 MG/1
TABLET ORAL
Qty: 30 TABLET | Refills: 4 | Status: SHIPPED | OUTPATIENT
Start: 2019-06-19 | End: 2019-12-18 | Stop reason: SDUPTHER

## 2019-06-19 RX ORDER — HYDRALAZINE HYDROCHLORIDE 100 MG/1
TABLET, FILM COATED ORAL
Qty: 90 TABLET | Refills: 4 | Status: SHIPPED | OUTPATIENT
Start: 2019-06-19 | End: 2019-08-27 | Stop reason: ALTCHOICE

## 2019-06-19 RX ORDER — NIFEDIPINE 90 MG/1
TABLET, EXTENDED RELEASE ORAL
Qty: 30 TABLET | Refills: 4 | Status: SHIPPED | OUTPATIENT
Start: 2019-06-19 | End: 2019-11-15 | Stop reason: SDUPTHER

## 2019-06-19 RX ORDER — GABAPENTIN 300 MG/1
300 CAPSULE ORAL
Qty: 30 CAPSULE | Refills: 0 | Status: SHIPPED | OUTPATIENT
Start: 2019-06-19 | End: 2019-08-27 | Stop reason: ALTCHOICE

## 2019-06-20 ENCOUNTER — APPOINTMENT (OUTPATIENT)
Dept: LAB | Facility: HOSPITAL | Age: 74
End: 2019-06-20
Attending: INTERNAL MEDICINE
Payer: COMMERCIAL

## 2019-06-20 ENCOUNTER — HOSPITAL ENCOUNTER (OUTPATIENT)
Dept: RADIOLOGY | Facility: HOSPITAL | Age: 74
Discharge: HOME | End: 2019-06-20
Attending: INTERNAL MEDICINE
Payer: COMMERCIAL

## 2019-06-20 DIAGNOSIS — M05.79 RHEUMATOID ARTHRITIS INVOLVING MULTIPLE SITES WITH POSITIVE RHEUMATOID FACTOR (HCC): ICD-10-CM

## 2019-06-20 DIAGNOSIS — G62.9 NEUROPATHY: ICD-10-CM

## 2019-06-20 DIAGNOSIS — M25.552 PAIN OF LEFT HIP JOINT: ICD-10-CM

## 2019-06-20 DIAGNOSIS — E78.2 MIXED HYPERLIPIDEMIA: ICD-10-CM

## 2019-06-20 DIAGNOSIS — R73.9 HYPERGLYCEMIA: ICD-10-CM

## 2019-06-20 DIAGNOSIS — Z11.59 NEED FOR HEPATITIS C SCREENING TEST: ICD-10-CM

## 2019-06-20 LAB
ALBUMIN SERPL BCP-MCNC: 4 G/DL (ref 3.5–5)
ALP SERPL-CCNC: 41 U/L (ref 46–116)
ALT SERPL W P-5'-P-CCNC: 31 U/L (ref 12–78)
ANION GAP SERPL CALCULATED.3IONS-SCNC: 13 MMOL/L (ref 4–13)
AST SERPL W P-5'-P-CCNC: 20 U/L (ref 5–45)
BASOPHILS # BLD AUTO: 0.02 THOUSANDS/ΜL (ref 0–0.1)
BASOPHILS NFR BLD AUTO: 0 % (ref 0–1)
BILIRUB SERPL-MCNC: 1.2 MG/DL (ref 0.2–1)
BUN SERPL-MCNC: 40 MG/DL (ref 5–25)
CALCIUM SERPL-MCNC: 9.4 MG/DL (ref 8.3–10.1)
CHLORIDE SERPL-SCNC: 101 MMOL/L (ref 100–108)
CHOLEST SERPL-MCNC: 147 MG/DL (ref 50–200)
CK SERPL-CCNC: 145 U/L (ref 39–308)
CO2 SERPL-SCNC: 22 MMOL/L (ref 21–32)
CREAT SERPL-MCNC: 1.43 MG/DL (ref 0.6–1.3)
CRP SERPL QL: <3 MG/L
EOSINOPHIL # BLD AUTO: 0.11 THOUSAND/ΜL (ref 0–0.61)
EOSINOPHIL NFR BLD AUTO: 1 % (ref 0–6)
ERYTHROCYTE [DISTWIDTH] IN BLOOD BY AUTOMATED COUNT: 12.9 % (ref 11.6–15.1)
EST. AVERAGE GLUCOSE BLD GHB EST-MCNC: 123 MG/DL
GFR SERPL CREATININE-BSD FRML MDRD: 49 ML/MIN/1.73SQ M
GLUCOSE P FAST SERPL-MCNC: 111 MG/DL (ref 65–99)
HBA1C MFR BLD: 5.9 % (ref 4.2–6.3)
HCT VFR BLD AUTO: 42.6 % (ref 36.5–49.3)
HDLC SERPL-MCNC: 62 MG/DL (ref 40–60)
HGB BLD-MCNC: 13.6 G/DL (ref 12–17)
IMM GRANULOCYTES # BLD AUTO: 0.09 THOUSAND/UL (ref 0–0.2)
IMM GRANULOCYTES NFR BLD AUTO: 1 % (ref 0–2)
LDLC SERPL CALC-MCNC: 67 MG/DL (ref 0–100)
LYMPHOCYTES # BLD AUTO: 2.79 THOUSANDS/ΜL (ref 0.6–4.47)
LYMPHOCYTES NFR BLD AUTO: 28 % (ref 14–44)
MCH RBC QN AUTO: 28.4 PG (ref 26.8–34.3)
MCHC RBC AUTO-ENTMCNC: 31.9 G/DL (ref 31.4–37.4)
MCV RBC AUTO: 89 FL (ref 82–98)
MONOCYTES # BLD AUTO: 0.87 THOUSAND/ΜL (ref 0.17–1.22)
MONOCYTES NFR BLD AUTO: 9 % (ref 4–12)
NEUTROPHILS # BLD AUTO: 6.1 THOUSANDS/ΜL (ref 1.85–7.62)
NEUTS SEG NFR BLD AUTO: 61 % (ref 43–75)
NONHDLC SERPL-MCNC: 85 MG/DL
NRBC BLD AUTO-RTO: 0 /100 WBCS
PLATELET # BLD AUTO: 191 THOUSANDS/UL (ref 149–390)
PMV BLD AUTO: 9.7 FL (ref 8.9–12.7)
POTASSIUM SERPL-SCNC: 4.8 MMOL/L (ref 3.5–5.3)
PROT SERPL-MCNC: 7.7 G/DL (ref 6.4–8.2)
RBC # BLD AUTO: 4.79 MILLION/UL (ref 3.88–5.62)
SODIUM SERPL-SCNC: 136 MMOL/L (ref 136–145)
TRIGL SERPL-MCNC: 89 MG/DL
WBC # BLD AUTO: 9.98 THOUSAND/UL (ref 4.31–10.16)

## 2019-06-20 PROCEDURE — 80061 LIPID PANEL: CPT

## 2019-06-20 PROCEDURE — 86618 LYME DISEASE ANTIBODY: CPT

## 2019-06-20 PROCEDURE — 36415 COLL VENOUS BLD VENIPUNCTURE: CPT

## 2019-06-20 PROCEDURE — 83036 HEMOGLOBIN GLYCOSYLATED A1C: CPT

## 2019-06-20 PROCEDURE — 73521 X-RAY EXAM HIPS BI 2 VIEWS: CPT

## 2019-06-20 PROCEDURE — 80053 COMPREHEN METABOLIC PANEL: CPT

## 2019-06-20 PROCEDURE — 82550 ASSAY OF CK (CPK): CPT

## 2019-06-20 PROCEDURE — 85025 COMPLETE CBC W/AUTO DIFF WBC: CPT

## 2019-06-20 PROCEDURE — 86803 HEPATITIS C AB TEST: CPT

## 2019-06-20 PROCEDURE — 86140 C-REACTIVE PROTEIN: CPT

## 2019-06-21 LAB — HCV AB SER QL: NORMAL

## 2019-06-22 LAB
B BURGDOR IGG SER IA-ACNC: 0.06
B BURGDOR IGM SER IA-ACNC: 0.14

## 2019-06-25 ENCOUNTER — TELEPHONE (OUTPATIENT)
Dept: INTERNAL MEDICINE CLINIC | Facility: CLINIC | Age: 74
End: 2019-06-25

## 2019-06-25 DIAGNOSIS — M54.6 THORACIC BACK PAIN, UNSPECIFIED BACK PAIN LATERALITY, UNSPECIFIED CHRONICITY: Primary | ICD-10-CM

## 2019-06-26 ENCOUNTER — HOSPITAL ENCOUNTER (OUTPATIENT)
Dept: RADIOLOGY | Facility: HOSPITAL | Age: 74
Discharge: HOME | End: 2019-06-26
Attending: INTERNAL MEDICINE
Payer: COMMERCIAL

## 2019-06-26 DIAGNOSIS — M54.6 THORACIC BACK PAIN, UNSPECIFIED BACK PAIN LATERALITY, UNSPECIFIED CHRONICITY: ICD-10-CM

## 2019-06-26 PROCEDURE — 72072 X-RAY EXAM THORAC SPINE 3VWS: CPT

## 2019-07-01 DIAGNOSIS — M54.6 THORACIC BACK PAIN, UNSPECIFIED BACK PAIN LATERALITY, UNSPECIFIED CHRONICITY: Primary | ICD-10-CM

## 2019-07-01 RX ORDER — TRAMADOL HYDROCHLORIDE 50 MG/1
50 TABLET ORAL EVERY 12 HOURS PRN
Qty: 20 TABLET | Refills: 0 | Status: SHIPPED | OUTPATIENT
Start: 2019-07-01 | End: 2019-08-27 | Stop reason: ALTCHOICE

## 2019-07-01 NOTE — TELEPHONE ENCOUNTER
Can set up rheumatology or pain mgmt appt   If back pain severe and insurance will cover MRI can do that which would lead more likely to painmgmt eval  He did not want Physical therapy evaluation  Can rx Tramadol 50mg one every 12 hours as needed #20 to see if that helps with pain until appt can be setup

## 2019-07-01 NOTE — TELEPHONE ENCOUNTER
Geri Carmona states that the pt doesn't think that the severe pain that he is having in his back and hips is being caused by arthritis  Pt is c/o the pain becoming severe  Pt wants to know what can be done   Pls advise

## 2019-07-02 NOTE — TELEPHONE ENCOUNTER
Tammie Guidry was informed and will inform pt  Pt will call office with name of where he would like to be seen at

## 2019-07-17 DIAGNOSIS — K21.9 GASTROESOPHAGEAL REFLUX DISEASE, ESOPHAGITIS PRESENCE NOT SPECIFIED: ICD-10-CM

## 2019-07-17 DIAGNOSIS — E78.5 DYSLIPIDEMIA: ICD-10-CM

## 2019-07-17 DIAGNOSIS — M19.90 ARTHRITIS: ICD-10-CM

## 2019-07-17 DIAGNOSIS — I48.0 PAF (PAROXYSMAL ATRIAL FIBRILLATION) (HCC): ICD-10-CM

## 2019-07-17 RX ORDER — ROSUVASTATIN CALCIUM 20 MG/1
TABLET, COATED ORAL
Qty: 30 TABLET | Refills: 4 | Status: SHIPPED | OUTPATIENT
Start: 2019-07-17 | End: 2019-12-18 | Stop reason: SDUPTHER

## 2019-07-17 RX ORDER — APIXABAN 5 MG/1
TABLET, FILM COATED ORAL
Qty: 60 TABLET | Refills: 4 | Status: SHIPPED | OUTPATIENT
Start: 2019-07-17 | End: 2020-01-17

## 2019-07-18 RX ORDER — PREDNISONE 1 MG/1
TABLET ORAL
Qty: 30 TABLET | Refills: 4 | Status: SHIPPED | OUTPATIENT
Start: 2019-07-18 | End: 2019-12-18 | Stop reason: SDUPTHER

## 2019-07-18 RX ORDER — FOLIC ACID 1 MG/1
TABLET ORAL
Qty: 30 TABLET | Refills: 4 | Status: SHIPPED | OUTPATIENT
Start: 2019-07-18 | End: 2019-12-18 | Stop reason: SDUPTHER

## 2019-07-18 RX ORDER — OMEPRAZOLE 20 MG/1
CAPSULE, DELAYED RELEASE ORAL
Qty: 30 CAPSULE | Refills: 4 | Status: SHIPPED | OUTPATIENT
Start: 2019-07-18 | End: 2019-12-18 | Stop reason: SDUPTHER

## 2019-07-26 ENCOUNTER — TELEPHONE (OUTPATIENT)
Dept: CARDIOLOGY CLINIC | Facility: HOSPITAL | Age: 74
End: 2019-07-26

## 2019-07-26 NOTE — TELEPHONE ENCOUNTER
S/W pt and instructed him to decrease lisinopril to 20 mgm daily and to increase furosemide to 80 mgm bid x2 days then resume 40 mgm bid  He wrote instructions down and verbalized understanding

## 2019-07-26 NOTE — TELEPHONE ENCOUNTER
What are his current medications - I am not sure that the list in Epic is updated  Thanks      Jennifer Tovar

## 2019-07-26 NOTE — TELEPHONE ENCOUNTER
Mr Elodia Kehr' current meds are:    Losartan 100 mgm 1/2 tab daily    Procardia XL 90 mgm daily    Lasix 40 mgm bid     Lisinopril 20 mgm bid    Aldactone 25 mgm daily

## 2019-07-26 NOTE — TELEPHONE ENCOUNTER
Call from pt reporting BP readings since decreasing losartan to 1/2 tab daily  He also informs that he has leg edema   Readigs as follows:    7/22  109/56 felt dizzy      7/23   130/62 not dizzy    7/24    110/62  not dizzy    7/25   120/62   not dizzy    7/26   110/60 dizzy

## 2019-07-30 ENCOUNTER — TELEPHONE (OUTPATIENT)
Dept: INTERNAL MEDICINE CLINIC | Facility: CLINIC | Age: 74
End: 2019-07-30

## 2019-07-30 DIAGNOSIS — M54.5 LOW BACK PAIN, UNSPECIFIED BACK PAIN LATERALITY, UNSPECIFIED CHRONICITY, WITH SCIATICA PRESENCE UNSPECIFIED: Primary | ICD-10-CM

## 2019-07-30 NOTE — TELEPHONE ENCOUNTER
[facility] states that the pts pain is more where his kidney is on his left side, and the pain gets worse when he lays down  [facility] wants to know if the pt should have an US done just to make sure  Pls advise

## 2019-08-02 ENCOUNTER — TELEPHONE (OUTPATIENT)
Dept: CARDIOLOGY CLINIC | Facility: HOSPITAL | Age: 74
End: 2019-08-02

## 2019-08-02 NOTE — TELEPHONE ENCOUNTER
Call from pt wife with blood pressure readings, he is feeling slightly dizzy at times but good otherwise    7/29 110/62  7/30 102/52  7/31 119/62  8/1 110/62  8/2 110/62  Please Advise

## 2019-08-03 ENCOUNTER — HOSPITAL ENCOUNTER (OUTPATIENT)
Dept: ULTRASOUND IMAGING | Facility: HOSPITAL | Age: 74
Discharge: HOME | End: 2019-08-03
Attending: INTERNAL MEDICINE
Payer: COMMERCIAL

## 2019-08-03 DIAGNOSIS — M54.5 LOW BACK PAIN, UNSPECIFIED BACK PAIN LATERALITY, UNSPECIFIED CHRONICITY, WITH SCIATICA PRESENCE UNSPECIFIED: ICD-10-CM

## 2019-08-03 PROCEDURE — 76770 US EXAM ABDO BACK WALL COMP: CPT

## 2019-08-08 DIAGNOSIS — M54.5 LOW BACK PAIN, UNSPECIFIED BACK PAIN LATERALITY, UNSPECIFIED CHRONICITY, WITH SCIATICA PRESENCE UNSPECIFIED: Primary | ICD-10-CM

## 2019-08-09 ENCOUNTER — TELEPHONE (OUTPATIENT)
Dept: INTERNAL MEDICINE CLINIC | Facility: CLINIC | Age: 74
End: 2019-08-09

## 2019-08-09 DIAGNOSIS — R10.9 FLANK PAIN: Primary | ICD-10-CM

## 2019-08-09 NOTE — TELEPHONE ENCOUNTER
I would order CT for stone protocol  Pt initially refused PT eval when I saw him the office  If CT approved do that first but if that is negative then PT would need to be ordered

## 2019-08-12 NOTE — TELEPHONE ENCOUNTER
I assume they will pay for an xray of kidneys ,ureter and bladder and if that suggest stone may cover CT

## 2019-08-13 ENCOUNTER — HOSPITAL ENCOUNTER (OUTPATIENT)
Dept: RADIOLOGY | Facility: HOSPITAL | Age: 74
Discharge: HOME | End: 2019-08-13
Attending: INTERNAL MEDICINE
Payer: COMMERCIAL

## 2019-08-13 ENCOUNTER — APPOINTMENT (OUTPATIENT)
Dept: LAB | Facility: HOSPITAL | Age: 74
End: 2019-08-13
Attending: INTERNAL MEDICINE
Payer: COMMERCIAL

## 2019-08-13 DIAGNOSIS — E78.5 DYSLIPIDEMIA: ICD-10-CM

## 2019-08-13 DIAGNOSIS — R10.9 FLANK PAIN: ICD-10-CM

## 2019-08-13 LAB
ALBUMIN SERPL BCP-MCNC: 3.7 G/DL (ref 3.5–5)
ALP SERPL-CCNC: 43 U/L (ref 46–116)
ALT SERPL W P-5'-P-CCNC: 33 U/L (ref 12–78)
ANION GAP SERPL CALCULATED.3IONS-SCNC: 10 MMOL/L (ref 4–13)
AST SERPL W P-5'-P-CCNC: 25 U/L (ref 5–45)
BILIRUB SERPL-MCNC: 0.9 MG/DL (ref 0.2–1)
BUN SERPL-MCNC: 32 MG/DL (ref 5–25)
CALCIUM SERPL-MCNC: 8.9 MG/DL (ref 8.3–10.1)
CHLORIDE SERPL-SCNC: 100 MMOL/L (ref 100–108)
CHOLEST SERPL-MCNC: 132 MG/DL (ref 50–200)
CO2 SERPL-SCNC: 25 MMOL/L (ref 21–32)
CREAT SERPL-MCNC: 1.42 MG/DL (ref 0.6–1.3)
GFR SERPL CREATININE-BSD FRML MDRD: 49 ML/MIN/1.73SQ M
GLUCOSE P FAST SERPL-MCNC: 118 MG/DL (ref 65–99)
HDLC SERPL-MCNC: 79 MG/DL (ref 40–60)
LDLC SERPL CALC-MCNC: 42 MG/DL (ref 0–100)
POTASSIUM SERPL-SCNC: 4.4 MMOL/L (ref 3.5–5.3)
PROT SERPL-MCNC: 7.4 G/DL (ref 6.4–8.2)
SODIUM SERPL-SCNC: 135 MMOL/L (ref 136–145)
TRIGL SERPL-MCNC: 54 MG/DL

## 2019-08-13 PROCEDURE — 80053 COMPREHEN METABOLIC PANEL: CPT | Performed by: INTERNAL MEDICINE

## 2019-08-13 PROCEDURE — 80061 LIPID PANEL: CPT

## 2019-08-13 PROCEDURE — 74018 RADEX ABDOMEN 1 VIEW: CPT

## 2019-08-13 PROCEDURE — 36415 COLL VENOUS BLD VENIPUNCTURE: CPT | Performed by: INTERNAL MEDICINE

## 2019-08-16 ENCOUNTER — TELEPHONE (OUTPATIENT)
Dept: INTERNAL MEDICINE CLINIC | Facility: CLINIC | Age: 74
End: 2019-08-16

## 2019-08-16 DIAGNOSIS — I10 ESSENTIAL HYPERTENSION: ICD-10-CM

## 2019-08-16 DIAGNOSIS — I50.32 CHF (CONGESTIVE HEART FAILURE), NYHA CLASS II, CHRONIC, DIASTOLIC (HCC): ICD-10-CM

## 2019-08-16 DIAGNOSIS — I10 BENIGN ESSENTIAL HYPERTENSION: ICD-10-CM

## 2019-08-16 RX ORDER — FUROSEMIDE 40 MG/1
TABLET ORAL
Qty: 60 TABLET | Refills: 4 | Status: SHIPPED | OUTPATIENT
Start: 2019-08-16 | End: 2020-04-17

## 2019-08-16 RX ORDER — LISINOPRIL 20 MG/1
TABLET ORAL
Qty: 60 TABLET | Refills: 4 | Status: SHIPPED | OUTPATIENT
Start: 2019-08-16 | End: 2020-01-17

## 2019-08-16 RX ORDER — SPIRONOLACTONE 25 MG/1
25 TABLET ORAL DAILY
Qty: 30 TABLET | Refills: 4 | Status: SHIPPED | OUTPATIENT
Start: 2019-08-16 | End: 2020-01-17

## 2019-08-16 NOTE — TELEPHONE ENCOUNTER
Can you please let Ángela Cabral know and schedule Blease Simpers if either of these appts are available?

## 2019-08-16 NOTE — TELEPHONE ENCOUNTER
Xray is negative for visible stone - he will have to setup followup appt   Can look for 7:30 am appt Tues or Thursday when I get back if they are open  With normal xray CT would not be approved unless exam correlates and they then approve

## 2019-08-18 NOTE — PROGRESS NOTES
Cardiology Follow Up    [address] 1936 6/37/4712  [id number]  [address] [place] [address] [facility] [address] [id number]    1  Chronic diastolic CHF (congestive heart failure) (HCC)  Basic metabolic panel   2  Paroxysmal atrial tachycardia (HCC)     3  Paroxysmal atrial fibrillation ([address] )     4  Nonocclusive coronary atherosclerosis of native coronary artery     5  Aortic stenosis, mild     6  Benign essential hypertension     7  Dyslipidemia         Discussion/Summary:  Mr Taylor Vides is a pleasant 70-year-old male who presents to the office today for routine follow-up  Since his last visit he feels well  He appears euvolemic on exam today  His most recent blood work reveals an elevated creatinine from baseline  I have asked that he decrease his Lasix dosing to every other day  I will reassess his renal function in a few weeks  His blood pressure appears well controlled on his current antihypertensive medication regimen to which no changes were made  He is tolerating rosuvastatin without side effects  His most recent lipids were reviewed  They are acceptable on current therapy  He was diagnosed with severe obstructive sleep apnea  He has been intolerant of multiple types of masks and therefore remains noncompliant with CPAP therapy  He is maintaining sinus rhythm  He will remain on his current AV edward blocking regimen and systemic anticoagulation with Eliquis  He does have mild aortic stenosis by echo in 2017  After his next visit I will reassess this with another echo  He will return to the office in six months for ongoing evaluation  Interval History:   Mr Taylor Vides is a pleasant 70-year-old male who presents to the office today for routine follow-up  After his last visit he was having some issues with low blood pressure and lightheadedness    He lost about 15 lb through increased activity  It is in this setting he would notice some lightheadedness  His blood pressure was on the low side  He had reported this to the office and his hydralazine was discontinued and his losartan dose cut in half  He has noted much improvement in his symptoms  Since his last visit he has been otherwise feeling well  He is sedentary but has not noted any recurrent shortness of breath or exertional chest pain  He denies any paroxysmal nocturnal dyspnea or orthopnea  He denies increasing abdominal girth  He weighs himself at home with a stable weight  He denies palpitations, syncope or presyncope  He denies symptoms of claudication      He is maintained on Eliquis without any bleeding consequences  He remains non-compliant with CPAP  Problem List     Aortic stenosis, mild    Chronic diastolic CHF (congestive heart failure) (HCC)    Coronary artery disease    Dyslipidemia    Hypertension    Obstructive sleep apnea of adult    Paroxysmal atrial fibrillation (HCC)    Paroxysmal atrial tachycardia ([facility])        Past Medical History:   Diagnosis Date    Atrial fibrillation ([facility] )     Hypertension     Nonocclusive coronary atherosclerosis of native coronary artery 3/26/2015     Social History     Socioeconomic History    Marital status: /Civil Union     Spouse name: Not on file    Number of children: Not on file    Years of education: Not on file    Highest education level: Not on file   Occupational History    Not on file   Social Needs    Financial resource strain: Not on file    Food insecurity:     Worry: Not on file     Inability: Not on file    Transportation needs:     Medical: Not on file     Non-medical: Not on file   Tobacco Use    Smoking status: Former Smoker    Smokeless tobacco: Former User   Substance and Sexual Activity    Alcohol use:  Yes     Alcohol/week: 4 0 standard drinks     Types: 4 Cans of beer per week     Comment: daily    Drug use: No    Sexual activity: Not on file   Lifestyle    Physical activity:     Days per week: Not on file     Minutes per session: Not on file    Stress: Not on file   Relationships    Social connections:     Talks on phone: Not on file     Gets together: Not on file     Attends Orthodox service: Not on file     Active member of club or organization: Not on file     Attends meetings of clubs or organizations: Not on file     Relationship status: Not on file    Intimate partner violence:     Fear of current or ex partner: Not on file     Emotionally abused: Not on file     Physically abused: Not on file     Forced sexual activity: Not on file   Other Topics Concern    Not on file   Social History Narrative    Dental care, regularly    Good dental hygiene    No advance directives    No caffeine use    Uses safety equipment - Seatbelts       Family History   Problem Relation Age of Onset    Diabetes Mother     Stroke Mother      Past Surgical History:   Procedure Laterality Date    APPENDECTOMY      [id number] [facility]    disc repair    CARDIAC CATHETERIZATION      50% lesion of mid LAD, 40% lesion of first obtuse marginal lesion  Last assessed 1/17/2018     CHOLECYSTECTOMY      COLON SURGERY      colon ressection    COLONOSCOPY N/A 1/14/2019    Procedure: COLONOSCOPY;  Surgeon: Telma Johnson MD;  Location: [facility];  Service: Colorectal    CYSTOSCOPY      Diagnostic     HEMORRHOID SURGERY  08/2011    TONSILLECTOMY         Current Outpatient Medications:     colesevelam (WELCHOL) 625 mg tablet, TAKE ONE TABLET BY MOUTH TWICE A DAY , Disp: 60 tablet, Rfl: 0    ELIQUIS 5 MG, TAKE ONE TABLET BY MOUTH TWICE A DAY , Disp: 60 tablet, Rfl: 4    folic acid (FOLVITE) 1 mg tablet, TAKE (1) TABLET BY MOUTH DAILY  , Disp: 30 tablet, Rfl: 4    furosemide (LASIX) 40 mg tablet, TAKE ONE TABLET BY MOUTH TWICE DAILY, Disp: 60 tablet, Rfl: 4    lisinopril (ZESTRIL) 20 mg tablet, TAKE (1) TABLET BY MOUTH TWICE DAILY  , Disp: 60 tablet, Rfl: 4    losartan (COZAAR) 100 MG tablet, TAKE (1) TABLET DAILY  (Patient taking differently: 50 mg daily ), Disp: 30 tablet, Rfl: 4    Magnesium Oxide -Mg Supplement 400 MG CAPS, Take 1 tablet by mouth daily, Disp: , Rfl:     montelukast (SINGULAIR) 10 mg tablet, TAKE (1) TABLET DAILY AS DIRECTED , Disp: 30 tablet, Rfl: 4    NIFEdipine (PROCARDIA XL) 90 mg 24 hr tablet, TAKE 1 TABLET DAILY  , Disp: 30 tablet, Rfl: 4    omeprazole (PriLOSEC) 20 mg delayed release capsule, TAKE (1) CAPSULE DAILY  , Disp: 30 capsule, Rfl: 4    predniSONE 5 mg tablet, TAKE ONE TABLET DAILY  , Disp: 30 tablet, Rfl: 4    rosuvastatin (CRESTOR) 20 MG tablet, TAKE ONE TABLET BY MOUTH DAILY, Disp: 30 tablet, Rfl: 4    spironolactone (ALDACTONE) 25 mg tablet, Take 1 tablet (25 mg total) by mouth daily, Disp: 30 tablet, Rfl: 4    gabapentin (NEURONTIN) 300 mg capsule, Take 1 capsule (300 mg total) by mouth daily at bedtime (Patient not taking: Reported on 8/22/2019), Disp: 30 capsule, Rfl: 0    hydrALAZINE (APRESOLINE) 100 MG tablet, TAKE 1 TABLET 3 times daily, Disp: 90 tablet, Rfl: 4    lidocaine (LIDODERM) 5 %, Place 1 patch on the skin every 24 hours for 5 days Remove & Discard patch within 12 hours or as directed by MD (Patient not taking: Reported on 9/27/2018 ), Disp: 5 patch, Rfl: 0    traMADol (ULTRAM) 50 mg tablet, Take 1 tablet (50 mg total) by mouth every 12 (twelve) hours as needed for moderate pain (Patient not taking: Reported on 8/22/2019), Disp: 20 tablet, Rfl: 0  No Known Allergies    Labs:     Chemistry        Component Value Date/Time     12/31/2015 0705    K 4 4 08/13/2019 0728    K 4 3 12/31/2015 0705     08/13/2019 0728     12/31/2015 0705    CO2 25 08/13/2019 0728    CO2 27 9 12/31/2015 0705    BUN 32 (H) 08/13/2019 0728    BUN 14 12/31/2015 0705    CREATININE 1 42 (H) 08/13/2019 0728    CREATININE 0 83 12/31/2015 0705        Component Value Date/Time    CALCIUM 8 9 08/13/2019 3884    CALCIUM 8 6 12/31/2015 0705    ALKPHOS 43 (L) 08/13/2019 0728    ALKPHOS 51 12/31/2015 0705    AST 25 08/13/2019 0728    AST 29 12/31/2015 0705    ALT 33 08/13/2019 0728    ALT 48 12/31/2015 0705    BILITOT 1 07 (H) 12/31/2015 0705            Lab Results   Component Value Date    CHOL 213 12/31/2015    CHOL 226 03/12/2015     Lab Results   Component Value Date    HDL 79 (H) 08/13/2019    HDL 62 (H) 06/20/2019    HDL 60 07/06/2018     Lab Results   Component Value Date    LDLCALC 42 08/13/2019    LDLCALC 67 06/20/2019    LDLCALC 30 07/06/2018     Lab Results   Component Value Date    TRIG 54 08/13/2019    TRIG 89 06/20/2019    TRIG 65 07/06/2018     No results found for: CHOLHDL    Imaging: No results found  Review of Systems   Constitution: Positive for weight loss  Cardiovascular: Negative for chest pain, claudication, cyanosis and dyspnea on exertion  All other systems reviewed and are negative  Vitals:    08/22/19 1343   BP: 120/74   Pulse: 75   SpO2: 96%     Vitals:    08/22/19 1343   Weight: 112 kg (247 lb 6 4 oz)     Height: 5' 9" (175 3 cm)   Body mass index is 36 53 kg/m²      Physical Exam:  General:  Alert and cooperative, appears stated age  HEENT:  PERRLA, EOMI, no scleral icterus, no conjunctival pallor  Neck:  No lymphadenopathy, no thyromegaly, no carotid bruits, no elevated JVP  Heart:  Regular rate and rhythm, normal S1/S2, 2/6 early to mid peaking systolic ejection murmur noted at the right upper sternal border without radiation  Lungs:  Clear to auscultation bilaterally   Abdomen:  Soft, non-tender, positive bowel sounds, no rebound or guarding,   no organomegaly   Extremities:  No clubbing, cyanosis or edema   Vascular:  2+ pedal pulses  Skin:  No rashes or lesions on exposed skin  Neurologic:  Cranial nerves II-XII grossly intact without focal deficits

## 2019-08-22 ENCOUNTER — OFFICE VISIT (OUTPATIENT)
Dept: CARDIOLOGY CLINIC | Facility: HOSPITAL | Age: 74
End: 2019-08-22
Payer: COMMERCIAL

## 2019-08-22 VITALS
HEART RATE: 75 BPM | HEIGHT: 69 IN | OXYGEN SATURATION: 96 % | BODY MASS INDEX: 36.64 KG/M2 | WEIGHT: 247.4 LBS | DIASTOLIC BLOOD PRESSURE: 74 MMHG | SYSTOLIC BLOOD PRESSURE: 120 MMHG

## 2019-08-22 DIAGNOSIS — E78.5 DYSLIPIDEMIA: ICD-10-CM

## 2019-08-22 DIAGNOSIS — I47.1 PAROXYSMAL ATRIAL TACHYCARDIA (HCC): ICD-10-CM

## 2019-08-22 DIAGNOSIS — I10 BENIGN ESSENTIAL HYPERTENSION: ICD-10-CM

## 2019-08-22 DIAGNOSIS — I50.32 CHRONIC DIASTOLIC CHF (CONGESTIVE HEART FAILURE) (HCC): Primary | ICD-10-CM

## 2019-08-22 DIAGNOSIS — I48.0 PAROXYSMAL ATRIAL FIBRILLATION (HCC): ICD-10-CM

## 2019-08-22 DIAGNOSIS — I35.0 AORTIC STENOSIS, MILD: ICD-10-CM

## 2019-08-22 DIAGNOSIS — I25.10 NONOCCLUSIVE CORONARY ATHEROSCLEROSIS OF NATIVE CORONARY ARTERY: ICD-10-CM

## 2019-08-22 PROCEDURE — 99214 OFFICE O/P EST MOD 30 MIN: CPT | Performed by: INTERNAL MEDICINE

## 2019-08-22 PROCEDURE — 3074F SYST BP LT 130 MM HG: CPT | Performed by: INTERNAL MEDICINE

## 2019-08-27 ENCOUNTER — OFFICE VISIT (OUTPATIENT)
Dept: INTERNAL MEDICINE CLINIC | Facility: CLINIC | Age: 74
End: 2019-08-27
Payer: COMMERCIAL

## 2019-08-27 VITALS
HEIGHT: 69 IN | WEIGHT: 246.6 LBS | DIASTOLIC BLOOD PRESSURE: 78 MMHG | BODY MASS INDEX: 36.53 KG/M2 | OXYGEN SATURATION: 94 % | TEMPERATURE: 97.9 F | HEART RATE: 70 BPM | SYSTOLIC BLOOD PRESSURE: 124 MMHG

## 2019-08-27 DIAGNOSIS — M54.50 ACUTE LEFT-SIDED LOW BACK PAIN WITHOUT SCIATICA: Primary | ICD-10-CM

## 2019-08-27 DIAGNOSIS — G57.02 PIRIFORMIS SYNDROME OF LEFT SIDE: ICD-10-CM

## 2019-08-27 PROCEDURE — 1036F TOBACCO NON-USER: CPT | Performed by: INTERNAL MEDICINE

## 2019-08-27 PROCEDURE — 1160F RVW MEDS BY RX/DR IN RCRD: CPT | Performed by: INTERNAL MEDICINE

## 2019-08-27 PROCEDURE — 99214 OFFICE O/P EST MOD 30 MIN: CPT | Performed by: INTERNAL MEDICINE

## 2019-08-27 PROCEDURE — 3008F BODY MASS INDEX DOCD: CPT | Performed by: INTERNAL MEDICINE

## 2019-08-27 RX ORDER — GABAPENTIN 300 MG/1
300 CAPSULE ORAL
Qty: 30 CAPSULE | Refills: 0 | Status: SHIPPED | OUTPATIENT
Start: 2019-08-27 | End: 2019-09-18 | Stop reason: SDUPTHER

## 2019-08-27 NOTE — PROGRESS NOTES
Assessment/Plan:         Diagnoses and all orders for this visit:    Acute left-sided low back pain without sciatica  -     gabapentin (NEURONTIN) 300 mg capsule; Take 1 capsule (300 mg total) by mouth daily at bedtime  -     MRI lumbar spine w contrast; Future    Piriformis syndrome of left side  -     MRI lumbar spine w contrast; Future    Based on ongoing sxs despite mdeical therapy/chiropractor order MRI as he would likely benefit from pain mgmt evaluation and possible localized injection       Patient ID: Rhonda Diaz  is a 69 y o  male  HPI   Pt has continued left lower back/hip area pain No relief especially at night - if he rolls onto that side he can not sleep comfortably due to pain  Pain mostly below waist level on left side and deep in the region  Meds thus far ineffective  He is tired from not sleeping No bowel or bladder dysfunction No falls in recent past He see chiropractor for left hip and was just there but the present pain is still there - his hip sxs for which he see chiropractor every few weeks are controlled tx  No leg numbness or tingling  No rash  No trauma  No hematuria  Imaging thus far shows back djd but no other contributing cause        Review of Systems   Constitutional: Negative  Respiratory: Negative  Cardiovascular: Negative  Musculoskeletal: Positive for arthralgias and gait problem  Skin: Negative  Neurological: Negative for dizziness and headaches  Hematological: Negative  pain left lower back especially when lying down  Past Medical History:   Diagnosis Date    Atrial fibrillation ([address] )     Hypertension     Nonocclusive coronary atherosclerosis of native coronary artery 3/26/2015     Past Surgical History:   Procedure Laterality Date    APPENDECTOMY      BACK SURGERY  1996    disc repair    CARDIAC CATHETERIZATION      50% lesion of mid LAD, 40% lesion of first obtuse marginal lesion   Last assessed 1/17/2018     CHOLECYSTECTOMY      COLON PT IRP Treatment  Plan of Care Note    Primary Rehabilitation Diagnosis: (P) CVA  Expected Discharge Date: (P) 07/07/17  Planned Discharge Destination: (P) Home    SUBJECTIVE: Subjective: Patient states he is tired and didn't get much sleep since his last PT session (06/23/17 1300)    OBJECTIVE:  Precautions  Other Precautions: Falls risk (06/23/17 0815)  Precautions Comments: Monitor BP (06/23/17 0815)                See below for current functional status overview.  See PT flowsheet for full details regarding the PT therapy provided.    ASSESSMENT:   Emphasis of AM PT session included bed mobility, transfers, gait training and standing balance. Patient ambulated x145' using 2WW, cues for posture and looking forward. Patient has LOB with turns/changing direction with gait, requires assist to maintain balance. Verbal cues needed for safe hand placement with sit<>stand transfers. Assist for safety with car door management (patient abandoning 2WW, LOB and poor control of car door). Patient also trying to close car door prior to both LE in car. Standing balance activities done in // bars, static stance on compliant and noncompliant surfaces. Patient unable to maintain standing balance for more than 3 seconds with no UE support.     Emphasis of PM PT session included bed mobility, transfers, gait training, sitting balance and LE strengthening. Patient significantly more fatigued this afternoon. Attempted gait training, patient presented with significant Lt lean upon standing and continued to lean Lt during short gait distance of 10 feet. Had patient sit in w/c, felt patient was unsafe to continue to ambulate. Patient sat edge of mat table working on seated LE strengthening with no ankle weights and yellow theraband. Worked on sitting balance and posture with reaching for cones across midline at various heights. Cues needed to look up and to look for the cone (patient blindly reaching for cone). Patient sat on betina disc,  SURGERY      colon ressection    COLONOSCOPY N/A 1/14/2019    Procedure: COLONOSCOPY;  Surgeon: Domitila Martinez MD;  Location: [facility];  Service: Colorectal    CYSTOSCOPY      Diagnostic     HEMORRHOID SURGERY  08/2011    TONSILLECTOMY       Social History     Socioeconomic History    Marital status: /Civil Union     Spouse name: Not on file    Number of children: Not on file    Years of education: Not on file    Highest education level: Not on file   Occupational History    Not on file   Social Needs    Financial resource strain: Not on file    Food insecurity:     Worry: Not on file     Inability: Not on file    Transportation needs:     Medical: Not on file     Non-medical: Not on file   Tobacco Use    Smoking status: Former Smoker    Smokeless tobacco: Former User   Substance and Sexual Activity    Alcohol use:  Yes     Alcohol/week: 4 0 standard drinks     Types: 4 Cans of beer per week     Comment: daily    Drug use: No    Sexual activity: Not on file   Lifestyle    Physical activity:     Days per week: Not on file     Minutes per session: Not on file    Stress: Not on file   Relationships    Social connections:     Talks on phone: Not on file     Gets together: Not on file     Attends Caodaism service: Not on file     Active member of club or organization: Not on file     Attends meetings of clubs or organizations: Not on file     Relationship status: Not on file    Intimate partner violence:     Fear of current or ex partner: Not on file     Emotionally abused: Not on file     Physically abused: Not on file     Forced sexual activity: Not on file   Other Topics Concern    Not on file   Social History Narrative    Dental care, regularly    Good dental hygiene    No advance directives    No caffeine use    Uses safety equipment - Seatbelts      No Known Allergies          /78   Pulse 70   Temp 97 9 °F (36 6 °C) (Tympanic)   Ht 5' 9" (1 753 m)   Wt 112 kg (246 lb 9 6 working on static sitting, presents with Lt lean, assist needed to get back to midline. Patient requesting to lay down, states he was feeling light headed and dizzy. RN came to gym, took patients vitals, BP normal, SpO2 was in the low 82%. 3L O2 placed on patient and recovered within a few minutes.  Patient's overall level of function is mod A for transfers and gait, min assist for sitting balance. Continued skilled therapy is indicated to address strength, endurance, gait and balance.    PT Identified Barriers to Discharge: lives alone, dizziness     EDUCATION:   On this date, the patient was educated on see assessment.    The response to education was: Needs reinforcement    PLAN:   Continue skilled PT, including the following Treatment/Interventions: Functional transfer training;Strengthening;ROM;Patient/Family training;Equipment eval/education;Bed mobility;Gait training;Stairs retraining;Safety Education;Neuromuscular re-education (06/22/17 1545)   PT Frequency: Twice a day (06/22/17 1545), Frequency Comments: Laura/Magy (06/23/17 1015)    Treatment Plan for Next Session: balance, gait, car transfer          RECOMMENDATIONS FOR DISCHARGE:  Recommendation for Discharge: PT: Home, Home therapy, OP therapy    PT/OT Mobility Equipment for Discharge: tbd, likely WW (06/22/17 1545)  PT/OT ADL Equipment for Discharge: TBD; has long handled shoe horn (06/23/17 0815)    GOALS:  Short Term Goals to Be Reviewed On: 06/30/17 (06/22/17 1545)  Short Term Goals = Discharge Goals: Yes (06/22/17 1545)  Goal Agreement: Patient agrees with goals and treatment plan (06/22/17 1545)  Bed Mobility Discharge Goal: supine<>sit indep (06/22/17 1545)  Transfer Discharge Goal: sit<>stand and pivot with LRAD and mod indep (06/22/17 1545)  Ambulation Discharge Goal: amb > 150' with LRAD and mod indep (06/22/17 1545)  Stairs Discharge Goal: pt 1 curb with mod indep  (06/22/17 1545)  Other Discharge Goal 1: pt will complete balance test and  oz)   SpO2 94%   BMI 36 42 kg/m²          Physical Exam   Constitutional: He is oriented to person, place, and time  He appears well-developed and well-nourished  No distress  HENT:   Head: Normocephalic and atraumatic  Mouth/Throat: No oropharyngeal exudate  Eyes: Pupils are equal, round, and reactive to light  Conjunctivae and EOM are normal  No scleral icterus  Pulmonary/Chest: Effort normal and breath sounds normal    Abdominal: Soft  Bowel sounds are normal    Musculoskeletal: Normal range of motion  He exhibits no edema  Neurological: He is alert and oriented to person, place, and time  Skin: Skin is warm and dry  He is not diaphoretic  No erythema  Psychiatric: He has a normal mood and affect  His behavior is normal  Judgment and thought content normal    Nursing note and vitals reviewed    positive straight leg raising at about 45 degrees on left Tenderness to palpation about 7/10 level in area left piriformis No rash Slight elevation left psis and tenderness to palpation  Pulses 2/4 bilateral le Neurovascularly intact DTRS 1/4 patellar left pass to be low fall risk (06/22/17 1545)  Other Discharge Goal 2: pt will perform floor transfer independently (06/22/17 1545)    FUNCTIONAL DATA OVERVIEW LAST 24 HOURS  Bed Mobility   Bed Mobility  Rolling to the Right: Modified Independent (06/22/17 1545)  Rolling to the Left: Modified Independent (06/22/17 1545)  Supine to Sit: Moderate Assist (Mod) (06/23/17 1300)  Sit to Supine: Supervision (Supv) (06/23/17 1300)  Bed Mobility Comments: bed flat, no use of bed rails (06/23/17 1015)    Transfers  Transfers  Sit to Stand: Minimal Assist (Min) (06/23/17 1300)  Stand to Sit: Moderate Assist (Mod) (06/23/17 1300)  Stand Pivot Transfers: Moderate Assist (Mod) (06/23/17 1300)  Car Transfers: Moderate Assist (Mod) (06/23/17 1015)  Assistive Device/: 2-wheeled walker;1 Person;Gait Belt (06/23/17 1300)  Transfer Comments 1: cues for hand placement (06/23/17 1300)  Transfer Comments 2: pt with significant Lt lean upon standing and was unable to get to midline (06/23/17 1300)    Gait  Gait  Gait Assistance: Minimal Assist (Min) (06/23/17 1015)  Assistive Device/: 2-wheeled walker;1 Person;Gait Belt (06/23/17 1015)  Ambulation Distance (Feet): 145 Feet (06/23/17 1015)  IRP Gait: Yes, the patient is walking (06/23/17 1015)  Walk 10 feet: Minimal Assist (Min) (06/23/17 1015)  Walk 50 feet with 2 turns: Minimal Assist (Min) (06/23/17 1015)  Walk 150 feet: Not attempted due to safety concerns (06/23/17 1015)  Walk 10 feet on uneven or sloping surfaces: Not attempted due to safety concerns (06/23/17 1015)   small object from floor: Not attempted due to safety concerns (06/23/17 1015)  Pattern: Shuffle (06/23/17 1015)  Ambulation Surface: Tile;Carpet (06/23/17 1015)  Gait Comments 1: LOB with turns, narrow base of support (06/23/17 1015)  Gait Comments 2: w/c follow for safety (06/23/17 1015)  Second Trial: Yes (06/23/17 1015), Gait - Second Trial  Gait Assistance: Moderate Assist (Mod) (06/23/17  1300)  Assistive Device/: 2-wheeled walker (06/23/17 1300)  Ambulation Distance (Feet): 10 Feet (06/23/17 1300)  Pattern: Shuffle;Foot drag L (06/23/17 1300)  Ambulation Surface: Tile;Carpet (06/23/17 1300)  Gait Comments 1: significant Lt lean, unable to correct (06/23/17 1300)  Gait Comments 2: unsafe to continue gait trial (06/23/17 1300)    Stairs  Stairs Mobility  Number of Stairs: 3 (06/22/17 1545)  Stair Management Assistance: Total Assist - Dependent (06/22/17 1545)  IRP Stairs: Yes (06/22/17 1545)  1 step (curb): Moderate Assist (Mod) (06/22/17 1545)  4 steps: Not attempted due to safety concerns (06/22/17 1545)  12 steps: Not attempted due to safety concerns (06/22/17 1545)  IRP Stairs Comments: min A for 3 steps, (06/22/17 1545)  Stair Management Technique: One rail R;Step to pattern (06/22/17 1545)    Wheelchair Mobility       Balance  Balance  Standing - Static: Minimal Assist (Min) (06/23/17 1300)  Standing - Dynamic (eyes open): Minimal Assist (Min) (06/23/17 1015)  Balance Comments #1: static stance, feet shoulder width apart, feet together, eyes open/closed (06/23/17 1015)  Balance Comments #2: static stance on airex, alternating toe taps on cone while standing on compliant surface. (06/23/17 1015)  Balance Comments #3: sitting on compliant surface reaching for cones across midline at various heights, sitting on betina disc with single arm raises and BUE raises (06/23/17 1300)

## 2019-08-28 ENCOUNTER — TELEPHONE (OUTPATIENT)
Dept: INTERNAL MEDICINE CLINIC | Facility: CLINIC | Age: 74
End: 2019-08-28

## 2019-08-28 DIAGNOSIS — G57.02 PIRIFORMIS SYNDROME OF LEFT SIDE: Primary | ICD-10-CM

## 2019-08-28 NOTE — TELEPHONE ENCOUNTER
[facility] denied MRI L spine - no documentation for Physical Therapy   - 6 weeks conservative treatment, home excerise program pls advise

## 2019-08-28 NOTE — TELEPHONE ENCOUNTER
Inform patient - he has seen chiropractor and I thought that would suffice but his insurance requires PT  Other option is to setup Ortho eval but I suspect they will Rx PT as well

## 2019-09-18 ENCOUNTER — TELEPHONE (OUTPATIENT)
Dept: INTERNAL MEDICINE CLINIC | Facility: CLINIC | Age: 74
End: 2019-09-18

## 2019-09-18 ENCOUNTER — DOCUMENTATION (OUTPATIENT)
Dept: INTERNAL MEDICINE CLINIC | Facility: CLINIC | Age: 74
End: 2019-09-18

## 2019-09-18 DIAGNOSIS — M54.50 ACUTE LEFT-SIDED LOW BACK PAIN WITHOUT SCIATICA: ICD-10-CM

## 2019-09-18 RX ORDER — GABAPENTIN 100 MG/1
CAPSULE ORAL
Qty: 30 CAPSULE | Refills: 4 | Status: SHIPPED | OUTPATIENT
Start: 2019-09-18 | End: 2020-03-03 | Stop reason: ALTCHOICE

## 2019-09-18 NOTE — TELEPHONE ENCOUNTER
Pharmacy asking if you can verify script that was sent for his Gabapentin  Script sent for Gabapentin 100mg tab  Take 1 capsule (300mg total) at bedtime?

## 2019-10-05 ENCOUNTER — APPOINTMENT (OUTPATIENT)
Dept: LAB | Facility: HOSPITAL | Age: 74
End: 2019-10-05
Attending: INTERNAL MEDICINE
Payer: MEDICARE

## 2019-10-05 LAB
ANION GAP SERPL CALCULATED.3IONS-SCNC: 9 MMOL/L (ref 4–13)
BUN SERPL-MCNC: 15 MG/DL (ref 5–25)
CALCIUM SERPL-MCNC: 8.8 MG/DL (ref 8.3–10.1)
CHLORIDE SERPL-SCNC: 99 MMOL/L (ref 100–108)
CO2 SERPL-SCNC: 26 MMOL/L (ref 21–32)
CREAT SERPL-MCNC: 0.87 MG/DL (ref 0.6–1.3)
GFR SERPL CREATININE-BSD FRML MDRD: 86 ML/MIN/1.73SQ M
GLUCOSE P FAST SERPL-MCNC: 117 MG/DL (ref 65–99)
POTASSIUM SERPL-SCNC: 4.8 MMOL/L (ref 3.5–5.3)
SODIUM SERPL-SCNC: 134 MMOL/L (ref 136–145)

## 2019-10-05 PROCEDURE — 80048 BASIC METABOLIC PNL TOTAL CA: CPT | Performed by: INTERNAL MEDICINE

## 2019-10-05 PROCEDURE — 36415 COLL VENOUS BLD VENIPUNCTURE: CPT | Performed by: INTERNAL MEDICINE

## 2019-10-08 ENCOUNTER — TELEPHONE (OUTPATIENT)
Dept: CARDIOLOGY CLINIC | Facility: CLINIC | Age: 74
End: 2019-10-08

## 2019-10-08 NOTE — TELEPHONE ENCOUNTER
Spoke to patient gave message of blood work looking good as per Dr Angelica Frost       ----- Message from Kayy Blackburn MD sent at 10/8/2019 11:12 AM EDT -----  Blood work looked good, please let the patient know

## 2019-11-15 DIAGNOSIS — I10 ESSENTIAL HYPERTENSION: ICD-10-CM

## 2019-11-15 RX ORDER — NIFEDIPINE 90 MG/1
TABLET, EXTENDED RELEASE ORAL
Qty: 30 TABLET | Refills: 4 | Status: SHIPPED | OUTPATIENT
Start: 2019-11-15 | End: 2020-04-17

## 2019-12-18 DIAGNOSIS — E78.5 DYSLIPIDEMIA: ICD-10-CM

## 2019-12-18 DIAGNOSIS — M19.90 ARTHRITIS: ICD-10-CM

## 2019-12-18 DIAGNOSIS — I10 ESSENTIAL HYPERTENSION: ICD-10-CM

## 2019-12-18 DIAGNOSIS — K21.9 GASTROESOPHAGEAL REFLUX DISEASE, ESOPHAGITIS PRESENCE NOT SPECIFIED: ICD-10-CM

## 2019-12-18 RX ORDER — LOSARTAN POTASSIUM 100 MG/1
100 TABLET ORAL DAILY
Qty: 30 TABLET | Refills: 4 | Status: SHIPPED | OUTPATIENT
Start: 2019-12-18 | End: 2020-05-15

## 2019-12-18 RX ORDER — ROSUVASTATIN CALCIUM 20 MG/1
TABLET, COATED ORAL
Qty: 30 TABLET | Refills: 4 | Status: SHIPPED | OUTPATIENT
Start: 2019-12-18 | End: 2020-05-15 | Stop reason: SDUPTHER

## 2019-12-18 RX ORDER — OMEPRAZOLE 20 MG/1
CAPSULE, DELAYED RELEASE ORAL
Qty: 30 CAPSULE | Refills: 4 | Status: SHIPPED | OUTPATIENT
Start: 2019-12-18 | End: 2020-05-15

## 2019-12-18 RX ORDER — FOLIC ACID 1 MG/1
TABLET ORAL
Qty: 30 TABLET | Refills: 4 | Status: SHIPPED | OUTPATIENT
Start: 2019-12-18 | End: 2020-05-15

## 2019-12-18 RX ORDER — PREDNISONE 1 MG/1
TABLET ORAL
Qty: 30 TABLET | Refills: 4 | Status: SHIPPED | OUTPATIENT
Start: 2019-12-18 | End: 2020-05-15

## 2020-01-17 DIAGNOSIS — I10 BENIGN ESSENTIAL HYPERTENSION: ICD-10-CM

## 2020-01-17 DIAGNOSIS — I48.0 PAF (PAROXYSMAL ATRIAL FIBRILLATION) (HCC): ICD-10-CM

## 2020-01-17 DIAGNOSIS — I10 ESSENTIAL HYPERTENSION: ICD-10-CM

## 2020-01-17 RX ORDER — LISINOPRIL 20 MG/1
20 TABLET ORAL 2 TIMES DAILY
Qty: 180 TABLET | Refills: 3 | Status: SHIPPED | OUTPATIENT
Start: 2020-01-17 | End: 2021-01-30

## 2020-01-17 RX ORDER — SPIRONOLACTONE 25 MG/1
25 TABLET ORAL DAILY
Qty: 30 TABLET | Refills: 0 | Status: SHIPPED | OUTPATIENT
Start: 2020-01-17 | End: 2020-02-19

## 2020-01-17 RX ORDER — APIXABAN 5 MG/1
TABLET, FILM COATED ORAL
Qty: 60 TABLET | Refills: 0 | Status: SHIPPED | OUTPATIENT
Start: 2020-01-17 | End: 2020-04-17

## 2020-02-02 NOTE — PROGRESS NOTES
Cardiology Follow Up    [address] 1936 [id number]  [id number]  [address] [facility] [address] [id number]    1  Chronic diastolic CHF (congestive heart failure) (HCC)  Basic metabolic panel   2  Paroxysmal atrial tachycardia (HCC)     3  Paroxysmal atrial fibrillation (HCC)  POCT ECG   4  Nonocclusive coronary atherosclerosis of native coronary artery     5  Benign essential hypertension     6  Dyslipidemia     7  Aortic stenosis, mild     8  Class 2 severe obesity due to excess calories with serious comorbidity and body mass index (BMI) of 36 0 to 36 9 in adult [facility])         Discussion/Summary:  Mr Lois Green is a pleasant 74-year-old male who presents to the office today for routine follow-up  Since his last visit he feels well  He appears euvolemic on exam today  Given he increased his Lasix dose back to 80 mg daily I have asked that he undergo repeat blood work to reassess his renal function and electrolytes  His blood pressure appears well controlled on his current antihypertensive medication regimen to which no changes were made  He is tolerating rosuvastatin without side effects  His most recent lipids were reviewed  They are acceptable on current therapy  He was diagnosed with severe obstructive sleep apnea  He has been intolerant of multiple types of masks and therefore remains noncompliant with CPAP therapy  He is maintaining sinus rhythm  He will remain on his current AV edward blocking regimen and systemic anticoagulation with Eliquis  He does have mild aortic stenosis by echo in 2017  I will request a repeat echocardiogram after his next visit  He will return to the office in six months for ongoing evaluation  Interval History:   Mr Lois Green is a pleasant 74-year-old male who presents to the office today for routine follow-up       After his last visit I asked that he decrease his Lasix to 40 daily due to PRESTON  He underwent blood work after that visit which showed his creatinine was back to baseline  However a few months ago he noted lower extremity edema  Hence he increased the Lasix back to 80 mg daily  He does not weigh himself on a regular basis  He does attempt to abide by a salt restricted diet  He has not noted any recurrent lower extremity edema, paroxysmal nocturnal dyspnea, orthopnea, acute weight gain or increasing abdominal girth  He is sedentary but denies any exertional chest pain or shortness of breath  He denies lightheadedness, syncope or presyncope  He denies palpitations or symptoms suggestive of recurrent atrial fibrillation  He is maintained on Eliquis without any bleeding consequences  He denies symptoms of claudication  He remains non-compliant with CPAP  Problem List     Aortic stenosis, mild    Chronic diastolic CHF (congestive heart failure) (HCC)    Coronary artery disease    Dyslipidemia    Hypertension    Obstructive sleep apnea of adult    Paroxysmal atrial fibrillation (HCC)    Paroxysmal atrial tachycardia ([facility])        Past Medical History:   Diagnosis Date    [facility]     Hypertension     Nonocclusive coronary atherosclerosis of native coronary artery 3/26/2015     Social History     Socioeconomic History    Marital status: /Civil Union     Spouse name: Not on file    Number of children: Not on file    Years of education: Not on file    Highest education level: Not on file   Occupational History    Not on file   Social Needs    Financial resource strain: Not on file    Food insecurity:     Worry: Not on file     Inability: Not on file    Transportation needs:     Medical: Not on file     Non-medical: Not on file   Tobacco Use    Smoking status: Former Smoker    Smokeless tobacco: Former User   Substance and Sexual Activity    Alcohol use:  Yes     Alcohol/week: 4 0 standard drinks     Types: 4 Cans of beer per week     Comment: daily    Drug use: No    Sexual activity: Not on file   Lifestyle    Physical activity:     Days per week: Not on file     Minutes per session: Not on file    Stress: Not on file   Relationships    Social connections:     Talks on phone: Not on file     Gets together: Not on file     Attends Mandaen service: Not on file     Active member of club or organization: Not on file     Attends meetings of clubs or organizations: Not on file     Relationship status: Not on file    Intimate partner violence:     Fear of current or ex partner: Not on file     Emotionally abused: Not on file     Physically abused: Not on file     Forced sexual activity: Not on file   Other Topics Concern    Not on file   Social History Narrative    Dental care, regularly    Good dental hygiene    No advance directives    No caffeine use    Uses safety equipment - Seatbelts       Family History   Problem Relation Age of Onset    Diabetes Mother     Stroke Mother      Past Surgical History:   Procedure Laterality Date    APPENDECTOMY      BACK SURGERY  1996    disc repair    CARDIAC CATHETERIZATION      50% lesion of mid LAD, 40% lesion of first obtuse marginal lesion  Last assessed 1/17/2018     CHOLECYSTECTOMY      COLON SURGERY      colon ressection    COLONOSCOPY N/A 1/14/2019    Procedure: COLONOSCOPY;  Surgeon: Kelly Batres MD;  Location: [facility];  Service: Colorectal    CYSTOSCOPY      Diagnostic     HEMORRHOID SURGERY  08/2011    TONSILLECTOMY         Current Outpatient Medications:     colesevelam (WELCHOL) 625 mg tablet, TAKE ONE TABLET BY MOUTH TWICE A DAY  (Patient taking differently: Take 625 mg by mouth daily ), Disp: 60 tablet, Rfl: 0    ELIQUIS 5 MG, TAKE ONE TABLET BY MOUTH TWICE A DAY , Disp: 60 tablet, Rfl: 0    folic acid (FOLVITE) 1 mg tablet, TAKE (1) TABLET BY MOUTH DAILY  , Disp: 30 tablet, Rfl: 4    furosemide (LASIX) 40 mg tablet, TAKE ONE TABLET BY MOUTH TWICE DAILY (Patient taking differently: Take 40 mg by mouth 2 (two) times a day ), Disp: 60 tablet, Rfl: 4    lisinopril (ZESTRIL) 20 mg tablet, Take 1 tablet (20 mg total) by mouth 2 (two) times a day, Disp: 180 tablet, Rfl: 3    losartan (COZAAR) 100 MG tablet, Take 1 tablet (100 mg total) by mouth daily, Disp: 30 tablet, Rfl: 4    NIFEdipine (PROCARDIA XL) 90 mg 24 hr tablet, TAKE 1 TABLET DAILY  , Disp: 30 tablet, Rfl: 4    omeprazole (PriLOSEC) 20 mg delayed release capsule, TAKE (1) CAPSULE DAILY  , Disp: 30 capsule, Rfl: 4    predniSONE 5 mg tablet, TAKE ONE TABLET DAILY  , Disp: 30 tablet, Rfl: 4    rosuvastatin (CRESTOR) 20 MG tablet, TAKE ONE TABLET BY MOUTH DAILY, Disp: 30 tablet, Rfl: 4    spironolactone (ALDACTONE) 25 mg tablet, Take 1 tablet (25 mg total) by mouth daily, Disp: 30 tablet, Rfl: 0    gabapentin (NEURONTIN) 100 mg capsule, Take 1 capsule (300 mg total) by mouth daily at bedtime (Patient not taking: No sig reported), Disp: 30 capsule, Rfl: 4    montelukast (SINGULAIR) 10 mg tablet, TAKE (1) TABLET DAILY AS DIRECTED   (Patient not taking: Reported on 8/27/2019), Disp: 30 tablet, Rfl: 4  No Known Allergies    Labs:     Chemistry        Component Value Date/Time     12/31/2015 0705    K 4 8 10/05/2019 0749    K 4 3 12/31/2015 0705    CL 99 (L) 10/05/2019 0749     12/31/2015 0705    CO2 26 10/05/2019 0749    CO2 27 9 12/31/2015 0705    BUN 15 10/05/2019 0749    BUN 14 12/31/2015 0705    CREATININE 0 87 10/05/2019 0749    CREATININE 0 83 12/31/2015 0705        Component Value Date/Time    CALCIUM 8 8 10/05/2019 0749    CALCIUM 8 6 12/31/2015 0705    ALKPHOS 43 (L) 08/13/2019 0728    ALKPHOS 51 12/31/2015 0705    AST 25 08/13/2019 0728    AST 29 12/31/2015 0705    ALT 33 08/13/2019 0728    ALT 48 12/31/2015 0705    BILITOT 1 07 (H) 12/31/2015 0705            Lab Results   Component Value Date    CHOL 213 12/31/2015    CHOL 226 03/12/2015     Lab Results Component Value Date    HDL 79 (H) 08/13/2019    HDL 62 (H) 06/20/2019    HDL 60 07/06/2018     Lab Results   Component Value Date    LDLCALC 42 08/13/2019    LDLCALC 67 06/20/2019    LDLCALC 30 07/06/2018     Lab Results   Component Value Date    TRIG 54 08/13/2019    TRIG 89 06/20/2019    TRIG 65 07/06/2018     No results found for: CHOLHDL    Imaging: No results found  Review of Systems   Cardiovascular: Positive for leg swelling  Negative for chest pain, claudication, cyanosis, dyspnea on exertion and orthopnea  All other systems reviewed and are negative  Vitals:    02/03/20 1337   BP: 128/70   Pulse:      Vitals:    02/03/20 1300   Weight: 116 kg (254 lb 13 6 oz)     Height: 5' 9" (175 3 cm)   Body mass index is 37 64 kg/m²      Physical Exam:  General:  Alert and cooperative, appears stated age  [de-identified]:  PERRLA, EOMI, no scleral icterus, no conjunctival pallor  Neck:  No lymphadenopathy, no thyromegaly, no carotid bruits, no elevated JVP  Heart:  Regular rate and rhythm, normal S1/S2, 2/6 mid peaking CARISSA RUSB with radiation to the carotids   Lungs:  Clear to auscultation bilaterally   Abdomen:  Soft, non-tender, positive bowel sounds, no rebound or guarding,   no organomegaly   Extremities:  No clubbing, cyanosis or edema   Vascular:  2+ pedal pulses  Skin:  No rashes or lesions on exposed skin  Neurologic:  Cranial nerves II-XII grossly intact without focal deficits

## 2020-02-03 ENCOUNTER — OFFICE VISIT (OUTPATIENT)
Dept: CARDIOLOGY CLINIC | Facility: HOSPITAL | Age: 75
End: 2020-02-03
Payer: MEDICARE

## 2020-02-03 ENCOUNTER — APPOINTMENT (OUTPATIENT)
Dept: LAB | Facility: HOSPITAL | Age: 75
End: 2020-02-03
Attending: INTERNAL MEDICINE
Payer: MEDICARE

## 2020-02-03 VITALS
SYSTOLIC BLOOD PRESSURE: 128 MMHG | HEART RATE: 79 BPM | BODY MASS INDEX: 37.75 KG/M2 | HEIGHT: 69 IN | WEIGHT: 254.85 LBS | DIASTOLIC BLOOD PRESSURE: 70 MMHG

## 2020-02-03 DIAGNOSIS — I25.10 NONOCCLUSIVE CORONARY ATHEROSCLEROSIS OF NATIVE CORONARY ARTERY: ICD-10-CM

## 2020-02-03 DIAGNOSIS — I50.32 CHRONIC DIASTOLIC CHF (CONGESTIVE HEART FAILURE) (HCC): Primary | ICD-10-CM

## 2020-02-03 DIAGNOSIS — I10 BENIGN ESSENTIAL HYPERTENSION: ICD-10-CM

## 2020-02-03 DIAGNOSIS — E78.5 DYSLIPIDEMIA: ICD-10-CM

## 2020-02-03 DIAGNOSIS — I35.0 AORTIC STENOSIS, MILD: ICD-10-CM

## 2020-02-03 DIAGNOSIS — E66.01 CLASS 2 SEVERE OBESITY DUE TO EXCESS CALORIES WITH SERIOUS COMORBIDITY AND BODY MASS INDEX (BMI) OF 36.0 TO 36.9 IN ADULT (HCC): ICD-10-CM

## 2020-02-03 DIAGNOSIS — I48.0 PAROXYSMAL ATRIAL FIBRILLATION (HCC): ICD-10-CM

## 2020-02-03 DIAGNOSIS — I47.1 PAROXYSMAL ATRIAL TACHYCARDIA (HCC): ICD-10-CM

## 2020-02-03 LAB
ANION GAP SERPL CALCULATED.3IONS-SCNC: 11 MMOL/L (ref 4–13)
BUN SERPL-MCNC: 25 MG/DL (ref 5–25)
CALCIUM SERPL-MCNC: 8.9 MG/DL (ref 8.3–10.1)
CHLORIDE SERPL-SCNC: 102 MMOL/L (ref 100–108)
CO2 SERPL-SCNC: 27 MMOL/L (ref 21–32)
CREAT SERPL-MCNC: 1.1 MG/DL (ref 0.6–1.3)
GFR SERPL CREATININE-BSD FRML MDRD: 67 ML/MIN/1.73SQ M
GLUCOSE P FAST SERPL-MCNC: 120 MG/DL (ref 65–99)
POTASSIUM SERPL-SCNC: 3.9 MMOL/L (ref 3.5–5.3)
SODIUM SERPL-SCNC: 140 MMOL/L (ref 136–145)

## 2020-02-03 PROCEDURE — 99214 OFFICE O/P EST MOD 30 MIN: CPT | Performed by: INTERNAL MEDICINE

## 2020-02-03 PROCEDURE — 36415 COLL VENOUS BLD VENIPUNCTURE: CPT | Performed by: INTERNAL MEDICINE

## 2020-02-03 PROCEDURE — 1036F TOBACCO NON-USER: CPT | Performed by: INTERNAL MEDICINE

## 2020-02-03 PROCEDURE — 4040F PNEUMOC VAC/ADMIN/RCVD: CPT | Performed by: INTERNAL MEDICINE

## 2020-02-03 PROCEDURE — 3074F SYST BP LT 130 MM HG: CPT | Performed by: INTERNAL MEDICINE

## 2020-02-03 PROCEDURE — 93000 ELECTROCARDIOGRAM COMPLETE: CPT | Performed by: INTERNAL MEDICINE

## 2020-02-03 PROCEDURE — 1160F RVW MEDS BY RX/DR IN RCRD: CPT | Performed by: INTERNAL MEDICINE

## 2020-02-03 PROCEDURE — 3078F DIAST BP <80 MM HG: CPT | Performed by: INTERNAL MEDICINE

## 2020-02-03 PROCEDURE — 80048 BASIC METABOLIC PNL TOTAL CA: CPT | Performed by: INTERNAL MEDICINE

## 2020-02-03 PROCEDURE — 3008F BODY MASS INDEX DOCD: CPT | Performed by: INTERNAL MEDICINE

## 2020-02-19 DIAGNOSIS — I10 BENIGN ESSENTIAL HYPERTENSION: ICD-10-CM

## 2020-02-19 RX ORDER — SPIRONOLACTONE 25 MG/1
25 TABLET ORAL DAILY
Qty: 30 TABLET | Refills: 4 | Status: SHIPPED | OUTPATIENT
Start: 2020-02-19 | End: 2020-08-19 | Stop reason: SDUPTHER

## 2020-03-03 ENCOUNTER — OFFICE VISIT (OUTPATIENT)
Dept: INTERNAL MEDICINE CLINIC | Facility: CLINIC | Age: 75
End: 2020-03-03
Payer: MEDICARE

## 2020-03-03 VITALS
HEART RATE: 65 BPM | OXYGEN SATURATION: 95 % | DIASTOLIC BLOOD PRESSURE: 76 MMHG | TEMPERATURE: 97.2 F | HEIGHT: 69 IN | SYSTOLIC BLOOD PRESSURE: 124 MMHG | WEIGHT: 258 LBS | BODY MASS INDEX: 38.21 KG/M2

## 2020-03-03 DIAGNOSIS — I73.9 PERIPHERAL VASCULAR DISEASE (HCC): ICD-10-CM

## 2020-03-03 DIAGNOSIS — M05.79 RHEUMATOID ARTHRITIS INVOLVING MULTIPLE SITES WITH POSITIVE RHEUMATOID FACTOR (HCC): ICD-10-CM

## 2020-03-03 DIAGNOSIS — Z12.5 SCREENING FOR PROSTATE CANCER: ICD-10-CM

## 2020-03-03 DIAGNOSIS — I25.119 ATHEROSCLEROSIS OF NATIVE CORONARY ARTERY OF NATIVE HEART WITH ANGINA PECTORIS (HCC): ICD-10-CM

## 2020-03-03 DIAGNOSIS — Z00.00 MEDICARE ANNUAL WELLNESS VISIT, SUBSEQUENT: Primary | ICD-10-CM

## 2020-03-03 PROCEDURE — 3074F SYST BP LT 130 MM HG: CPT | Performed by: INTERNAL MEDICINE

## 2020-03-03 PROCEDURE — 1170F FXNL STATUS ASSESSED: CPT | Performed by: INTERNAL MEDICINE

## 2020-03-03 PROCEDURE — 1160F RVW MEDS BY RX/DR IN RCRD: CPT | Performed by: INTERNAL MEDICINE

## 2020-03-03 PROCEDURE — 3008F BODY MASS INDEX DOCD: CPT | Performed by: INTERNAL MEDICINE

## 2020-03-03 PROCEDURE — 1125F AMNT PAIN NOTED PAIN PRSNT: CPT | Performed by: INTERNAL MEDICINE

## 2020-03-03 PROCEDURE — 1036F TOBACCO NON-USER: CPT | Performed by: INTERNAL MEDICINE

## 2020-03-03 PROCEDURE — 4040F PNEUMOC VAC/ADMIN/RCVD: CPT | Performed by: INTERNAL MEDICINE

## 2020-03-03 PROCEDURE — G0439 PPPS, SUBSEQ VISIT: HCPCS | Performed by: INTERNAL MEDICINE

## 2020-03-03 PROCEDURE — 3078F DIAST BP <80 MM HG: CPT | Performed by: INTERNAL MEDICINE

## 2020-03-03 NOTE — PROGRESS NOTES
Assessment and Plan:     Problem List Items Addressed This Visit        Cardiovascular and Mediastinum    Atherosclerosis of native coronary artery of native heart with angina pectoris (HCC)    Relevant Orders    Lipid panel    Comprehensive metabolic panel    HEMOGLOBIN A1C W/ EAG ESTIMATION       Musculoskeletal and Integument    Rheumatoid arthritis ([place] [address] )       Other    Medicare annual wellness visit, subsequent - Primary      Other Visit Diagnoses     Peripheral vascular disease ([place] [address] )        Screening for prostate cancer        Relevant Orders    PSA Total, Diagnostic      Rx for fbw  Increase water intake  Eye exam utd  Weight loss encouraged  Limit NSAID use  Rto 4 months    Preventive health issues were discussed with patient, and age appropriate screening tests were ordered as noted in patient's After Visit Summary  Personalized health advice and appropriate referrals for health education or preventive services given if needed, as noted in patient's After Visit Summary       History of Present Illness:     Patient presents for Medicare Annual Wellness visit    Patient Care Team:  Jennifer Hernandez DO as PCP - General  DO Jennifer Arce, Claudetta Ganong, MD as Endoscopist     Problem List:     Patient Active Problem List   Diagnosis    Aortic stenosis, mild    Chronic diastolic CHF (congestive heart failure) ([place] [address] )    Nonocclusive coronary atherosclerosis of native coronary artery    Dyslipidemia    Benign essential hypertension    Obstructive sleep apnea of adult    Paroxysmal atrial fibrillation (HCC)    [facility]ox[facility] ([facility] )    Allergic rhinitis due to pollen    Erectile dysfunction    GERD without esophagitis    Intermittent claudication ([place] [address] )    Microscopic hematuria    Neuropathy    Obesity    Renovascular hypertension    Rheumatoid arthritis ([place] [address] )    History of colon polyps    Acute left-sided low back pain without sciatica    Piriformis syndrome of left side    Medicare annual wellness visit, subsequent    Atherosclerosis of native coronary artery of native heart with angina pectoris [facility])      Past Medical and Surgical History:     Past Medical History:   Diagnosis Date    Atrial fibrillation ([address] )     Hypertension     Nonocclusive coronary atherosclerosis of native coronary artery 3/26/2015     Past Surgical History:   Procedure Laterality Date    APPENDECTOMY      BACK SURGERY  1996    disc repair    CARDIAC CATHETERIZATION      50% lesion of mid LAD, 40% lesion of first obtuse marginal lesion  Last assessed 1/17/2018     CHOLECYSTECTOMY      COLON SURGERY      colon ressection    COLONOSCOPY N/A 1/14/2019    Procedure: COLONOSCOPY;  Surgeon: Fredi Morris MD;  [facility];  Service: Colorectal    CYSTOSCOPY      Diagnostic     HEMORRHOID SURGERY  08/2011    TONSILLECTOMY        Family History:     Family History   Problem Relation Age of Onset    Diabetes Mother     Stroke Mother       Social History:     E-Cigarette/Vaping    E-Cigarette Use Never User      E-Cigarette/Vaping Substances    Nicotine No     THC No     CBD No     Flavoring No     Other No     Unknown No      Social History     Socioeconomic History    Marital status: /Civil Union     Spouse name: None    Number of children: None    Years of education: None    Highest education level: None   Occupational History    None   Social Needs    Financial resource strain: None    Food insecurity:     Worry: None     Inability: None    Transportation needs:     Medical: None     Non-medical: None   Tobacco Use    Smoking status: Former Smoker    Smokeless tobacco: Former User   Substance and Sexual Activity    Alcohol use:  Yes     Alcohol/week: 4 0 standard drinks     Types: 4 Cans of beer per week     Comment: daily    Drug use: No    Sexual activity: None   Lifestyle    Physical activity:     Days per week: None     Minutes per session: None  Stress: None   Relationships    Social connections:     Talks on phone: None     Gets together: None     Attends Taoism service: None     Active member of club or organization: None     Attends meetings of clubs or organizations: None     Relationship status: None    Intimate partner violence:     Fear of current or ex partner: None     Emotionally abused: None     Physically abused: None     Forced sexual activity: None   Other Topics Concern    None   Social History Narrative    Dental care, regularly    Good dental hygiene    No advance directives    No caffeine use    Uses safety equipment - Seatbelts       Medications and Allergies:     Current Outpatient Medications   Medication Sig Dispense Refill    colesevelam (WELCHOL) 625 mg tablet TAKE ONE TABLET BY MOUTH TWICE A DAY  (Patient taking differently: Take 625 mg by mouth daily ) 60 tablet 0    ELIQUIS 5 MG TAKE ONE TABLET BY MOUTH TWICE A DAY  60 tablet 0    folic acid (FOLVITE) 1 mg tablet TAKE (1) TABLET BY MOUTH DAILY  30 tablet 4    furosemide (LASIX) 40 mg tablet TAKE ONE TABLET BY MOUTH TWICE DAILY (Patient taking differently: Take 40 mg by mouth 2 (two) times a day ) 60 tablet 4    lisinopril (ZESTRIL) 20 mg tablet Take 1 tablet (20 mg total) by mouth 2 (two) times a day 180 tablet 3    losartan (COZAAR) 100 MG tablet Take 1 tablet (100 mg total) by mouth daily 30 tablet 4    NIFEdipine (PROCARDIA XL) 90 mg 24 hr tablet TAKE 1 TABLET DAILY  30 tablet 4    omeprazole (PriLOSEC) 20 mg delayed release capsule TAKE (1) CAPSULE DAILY  30 capsule 4    predniSONE 5 mg tablet TAKE ONE TABLET DAILY  30 tablet 4    rosuvastatin (CRESTOR) 20 MG tablet TAKE ONE TABLET BY MOUTH DAILY 30 tablet 4    spironolactone (ALDACTONE) 25 mg tablet Take 1 tablet (25 mg total) by mouth daily 30 tablet 4     No current facility-administered medications for this visit        No Known Allergies   Immunizations:     Immunization History   Administered Date(s) Administered    Influenza TIV (IM) 04/18/2016    Pneumococcal Polysaccharide PPV23 04/18/2016      Health Maintenance:         Topic Date Due    CRC Screening: Colonoscopy  01/14/2021    Hepatitis C Screening  Completed         Topic Date Due    DTaP,Tdap,and Td Vaccines (1 - Tdap) 05/13/1956      Medicare Health Risk Assessment:     /76   Pulse 65   Temp (!) 97 2 °F (36 2 °C) (Tympanic)   Ht 5' 9" (1 753 m)   Wt 117 kg (258 lb)   SpO2 95%   BMI 38 10 kg/m²      Hai Minor is here for his Subsequent Wellness visit  Last Medicare Wellness visit information reviewed, patient interviewed and updates made to the record today  Health Risk Assessment:   Patient rates overall health as fair  Patient feels that their physical health rating is much better  Eyesight was rated as same  Hearing was rated as same  Patient feels that their emotional and mental health rating is same  Pain experienced in the last 7 days has been none  Patient states that he has experienced no weight loss or gain in last 6 months  Depression Screening:   PHQ-2 Score: 0      Fall Risk Screening: In the past year, patient has experienced: no history of falling in past year      Home Safety:  Patient does not have trouble with stairs inside or outside of their home  Patient has working smoke alarms and has working carbon monoxide detector  Home safety hazards include: none  Nutrition:   Current diet is Regular  Medications:   Patient is not currently taking any over-the-counter supplements  Patient is able to manage medications  Activities of Daily Living (ADLs)/Instrumental Activities of Daily Living (IADLs):   Walk and transfer into and out of bed and chair?: No  Dress and groom yourself?: No    Bathe or shower yourself?: No    Feed yourself?  No  Do your laundry/housekeeping?: No  Manage your money, pay your bills and track your expenses?: No  Make your own meals?: No    Do your own shopping?: No    Previous Hospitalizations:   Any hospitalizations or ED visits within the last 12 months?: No      Advance Care Planning:   Living will: Yes    Durable POA for healthcare:  Yes    Advanced directive: No    End of Life Decisions reviewed with patient: Yes    Provider agrees with end of life decisions: Yes      PREVENTIVE SCREENINGS      Cardiovascular Screening:    General: History Lipid Disorder and Risks and Benefits Discussed    Due for: Lipid Panel      Diabetes Screening:     General: Screening Current      Colorectal Cancer Screening:     General: Screening Current      Prostate Cancer Screening:    General: Risks and Benefits Discussed      Osteoporosis Screening:    General: Screening Not Indicated      Abdominal Aortic Aneurysm (AAA) Screening:    Risk factors include: age between 73-67 yo and tobacco use        Lung Cancer Screening:     General: Screening Not Indicated      Hepatitis C Screening:    General: Screening Current      Major DO Saul

## 2020-04-16 DIAGNOSIS — I10 ESSENTIAL HYPERTENSION: ICD-10-CM

## 2020-04-16 DIAGNOSIS — I50.32 CHF (CONGESTIVE HEART FAILURE), NYHA CLASS II, CHRONIC, DIASTOLIC (HCC): ICD-10-CM

## 2020-04-16 DIAGNOSIS — I48.0 PAF (PAROXYSMAL ATRIAL FIBRILLATION) (HCC): ICD-10-CM

## 2020-04-17 RX ORDER — FUROSEMIDE 40 MG/1
40 TABLET ORAL 2 TIMES DAILY
Qty: 180 TABLET | Refills: 3 | Status: SHIPPED | OUTPATIENT
Start: 2020-04-17 | End: 2021-04-29 | Stop reason: SDUPTHER

## 2020-04-17 RX ORDER — APIXABAN 5 MG/1
TABLET, FILM COATED ORAL
Qty: 60 TABLET | Refills: 4 | Status: SHIPPED | OUTPATIENT
Start: 2020-04-17 | End: 2020-08-19 | Stop reason: SDUPTHER

## 2020-04-17 RX ORDER — NIFEDIPINE 90 MG/1
TABLET, EXTENDED RELEASE ORAL
Qty: 30 TABLET | Refills: 4 | Status: SHIPPED | OUTPATIENT
Start: 2020-04-17 | End: 2020-10-16

## 2020-05-15 DIAGNOSIS — I10 ESSENTIAL HYPERTENSION: ICD-10-CM

## 2020-05-15 DIAGNOSIS — K21.9 GASTROESOPHAGEAL REFLUX DISEASE, ESOPHAGITIS PRESENCE NOT SPECIFIED: ICD-10-CM

## 2020-05-15 DIAGNOSIS — E78.5 DYSLIPIDEMIA: ICD-10-CM

## 2020-05-15 DIAGNOSIS — M19.90 ARTHRITIS: ICD-10-CM

## 2020-05-15 RX ORDER — PREDNISONE 1 MG/1
TABLET ORAL
Qty: 30 TABLET | Refills: 4 | Status: SHIPPED | OUTPATIENT
Start: 2020-05-15 | End: 2020-10-16

## 2020-05-15 RX ORDER — FOLIC ACID 1 MG/1
TABLET ORAL
Qty: 30 TABLET | Refills: 4 | Status: SHIPPED | OUTPATIENT
Start: 2020-05-15 | End: 2020-10-16

## 2020-05-15 RX ORDER — ROSUVASTATIN CALCIUM 20 MG/1
TABLET, COATED ORAL
Qty: 30 TABLET | Refills: 4 | Status: SHIPPED | OUTPATIENT
Start: 2020-05-15 | End: 2020-10-16

## 2020-05-15 RX ORDER — LOSARTAN POTASSIUM 100 MG/1
100 TABLET ORAL DAILY
Qty: 30 TABLET | Refills: 4 | Status: SHIPPED | OUTPATIENT
Start: 2020-05-15 | End: 2020-11-18

## 2020-05-15 RX ORDER — OMEPRAZOLE 20 MG/1
CAPSULE, DELAYED RELEASE ORAL
Qty: 30 CAPSULE | Refills: 4 | Status: SHIPPED | OUTPATIENT
Start: 2020-05-15 | End: 2020-10-16

## 2020-07-01 ENCOUNTER — APPOINTMENT (OUTPATIENT)
Dept: LAB | Facility: HOSPITAL | Age: 75
End: 2020-07-01
Attending: INTERNAL MEDICINE
Payer: MEDICARE

## 2020-07-01 DIAGNOSIS — I25.119 ATHEROSCLEROSIS OF NATIVE CORONARY ARTERY OF NATIVE HEART WITH ANGINA PECTORIS (HCC): ICD-10-CM

## 2020-07-01 DIAGNOSIS — Z12.5 SCREENING FOR PROSTATE CANCER: ICD-10-CM

## 2020-07-01 LAB
ALBUMIN SERPL BCP-MCNC: 3.9 G/DL (ref 3.5–5)
ALP SERPL-CCNC: 47 U/L (ref 46–116)
ALT SERPL W P-5'-P-CCNC: 55 U/L (ref 12–78)
ANION GAP SERPL CALCULATED.3IONS-SCNC: 7 MMOL/L (ref 4–13)
AST SERPL W P-5'-P-CCNC: 35 U/L (ref 5–45)
BILIRUB SERPL-MCNC: 1.6 MG/DL (ref 0.2–1)
BUN SERPL-MCNC: 21 MG/DL (ref 5–25)
CALCIUM SERPL-MCNC: 9 MG/DL (ref 8.3–10.1)
CHLORIDE SERPL-SCNC: 100 MMOL/L (ref 100–108)
CHOLEST SERPL-MCNC: 156 MG/DL (ref 50–200)
CO2 SERPL-SCNC: 28 MMOL/L (ref 21–32)
CREAT SERPL-MCNC: 1.3 MG/DL (ref 0.6–1.3)
EST. AVERAGE GLUCOSE BLD GHB EST-MCNC: 131 MG/DL
GFR SERPL CREATININE-BSD FRML MDRD: 54 ML/MIN/1.73SQ M
GLUCOSE P FAST SERPL-MCNC: 111 MG/DL (ref 65–99)
HBA1C MFR BLD: 6.2 %
HDLC SERPL-MCNC: 82 MG/DL
LDLC SERPL CALC-MCNC: 54 MG/DL (ref 0–100)
NONHDLC SERPL-MCNC: 74 MG/DL
POTASSIUM SERPL-SCNC: 4.8 MMOL/L (ref 3.5–5.3)
PROT SERPL-MCNC: 7.9 G/DL (ref 6.4–8.2)
PSA SERPL-MCNC: 0.4 NG/ML (ref 0–4)
SODIUM SERPL-SCNC: 135 MMOL/L (ref 136–145)
TRIGL SERPL-MCNC: 102 MG/DL

## 2020-07-01 PROCEDURE — 36415 COLL VENOUS BLD VENIPUNCTURE: CPT

## 2020-07-01 PROCEDURE — 83036 HEMOGLOBIN GLYCOSYLATED A1C: CPT

## 2020-07-01 PROCEDURE — 80061 LIPID PANEL: CPT

## 2020-07-01 PROCEDURE — 80053 COMPREHEN METABOLIC PANEL: CPT

## 2020-07-01 PROCEDURE — G0103 PSA SCREENING: HCPCS

## 2020-07-07 ENCOUNTER — OFFICE VISIT (OUTPATIENT)
Dept: INTERNAL MEDICINE CLINIC | Facility: CLINIC | Age: 75
End: 2020-07-07
Payer: MEDICARE

## 2020-07-07 VITALS
DIASTOLIC BLOOD PRESSURE: 72 MMHG | TEMPERATURE: 98.6 F | WEIGHT: 256 LBS | HEIGHT: 69 IN | OXYGEN SATURATION: 94 % | HEART RATE: 97 BPM | BODY MASS INDEX: 37.92 KG/M2 | SYSTOLIC BLOOD PRESSURE: 130 MMHG

## 2020-07-07 DIAGNOSIS — I48.0 PAROXYSMAL ATRIAL FIBRILLATION (HCC): ICD-10-CM

## 2020-07-07 DIAGNOSIS — I50.32 CHRONIC DIASTOLIC CHF (CONGESTIVE HEART FAILURE) (HCC): ICD-10-CM

## 2020-07-07 DIAGNOSIS — I10 BENIGN ESSENTIAL HYPERTENSION: Primary | ICD-10-CM

## 2020-07-07 PROCEDURE — 1036F TOBACCO NON-USER: CPT | Performed by: INTERNAL MEDICINE

## 2020-07-07 PROCEDURE — 1160F RVW MEDS BY RX/DR IN RCRD: CPT | Performed by: INTERNAL MEDICINE

## 2020-07-07 PROCEDURE — 3008F BODY MASS INDEX DOCD: CPT | Performed by: INTERNAL MEDICINE

## 2020-07-07 PROCEDURE — 3078F DIAST BP <80 MM HG: CPT | Performed by: INTERNAL MEDICINE

## 2020-07-07 PROCEDURE — 3075F SYST BP GE 130 - 139MM HG: CPT | Performed by: INTERNAL MEDICINE

## 2020-07-07 PROCEDURE — 99214 OFFICE O/P EST MOD 30 MIN: CPT | Performed by: INTERNAL MEDICINE

## 2020-07-07 PROCEDURE — 4040F PNEUMOC VAC/ADMIN/RCVD: CPT | Performed by: INTERNAL MEDICINE

## 2020-07-07 NOTE — PROGRESS NOTES
BMI Counseling: Body mass index is 37 8 kg/m²  The BMI is above normal  Nutrition recommendations include consuming healthier snacks and moderation in carbohydrate intake  Exercise recommendations include exercising 3-5 times per week  Assessment/Plan:         Diagnoses and all orders for this visit:    Benign essential hypertension  Bp stable  Encouraged lo sodium diet and weight loss   Continue current rx      Paroxysmal atrial fibrillation (HCC)  Sxs managed, continue current rx    Chronic [facility]est[facility]  Sxs managed He saw cardio in February and will have echo in Fall  Continue current rx      Rto 6 months  Aware colon due in Jan 2021       Patient ID: Samia Nathan  is a 70 y o  male  HPI   Pt doing generally well He is active with outside projects at home No chest pain or sob No limiting joint pain No change in bowels or bleeding No n/v He is active daily and able to complete his tasks with rest periods He is drinking more water No falls He offers no complaints He does not check his blood sugars at home        Review of Systems   Constitutional: Negative  HENT: Negative  Respiratory: Negative  Cardiovascular: Negative  Gastrointestinal: Negative  Genitourinary: Negative  Musculoskeletal: Positive for arthralgias  Skin: Negative  Neurological: Negative  Negative for dizziness, numbness and headaches  Hematological: Negative  Psychiatric/Behavioral: Negative  Past Medical History:   Diagnosis Date    [facility] ([facility] )     Hypertension     Nonocclusive coronary atherosclerosis of native coronary artery 3/26/2015     Past Surgical History:   Procedure Laterality Date    APPENDECTOMY      BACK SURGERY  1996    disc repair    CARDIAC CATHETERIZATION      50% lesion of mid LAD, 40% lesion of first obtuse marginal lesion   Last assessed 1/17/2018     CHOLECYSTECTOMY      COLON SURGERY      colon ressection    COLONOSCOPY N/A 1/14/2019    Procedure: COLONOSCOPY;  Surgeon: Mariam Robles MD;  Location: [facility];  Service: Colorectal    CYSTOSCOPY      Diagnostic     HEMORRHOID SURGERY  08/2011    TONSILLECTOMY       Social History     Socioeconomic History    Marital status: /Civil Union     Spouse name: Not on file    Number of children: Not on file    Years of education: Not on file    Highest education level: Not on file   Occupational History    Not on file   Social Needs    Financial resource strain: Not on file    Food insecurity:     Worry: Not on file     Inability: Not on file    Transportation needs:     Medical: Not on file     Non-medical: Not on file   Tobacco Use    Smoking status: Former Smoker    Smokeless tobacco: Former User   Substance and Sexual Activity    Alcohol use:  Yes     Alcohol/week: 4 0 standard drinks     Types: 4 Cans of beer per week     Comment: daily    Drug use: No    Sexual activity: Not on file   Lifestyle    Physical activity:     Days per week: Not on file     Minutes per session: Not on file    Stress: Not on file   Relationships    Social connections:     Talks on phone: Not on file     Gets together: Not on file     Attends Mu-ism service: Not on file     Active member of club or organization: Not on file     Attends meetings of clubs or organizations: Not on file     Relationship status: Not on file    Intimate partner violence:     Fear of current or ex partner: Not on file     Emotionally abused: Not on file     Physically abused: Not on file     Forced sexual activity: Not on file   Other Topics Concern    Not on file   Social History Narrative    Dental care, regularly    Good dental hygiene    No advance directives    No caffeine use    Uses safety equipment - Seatbelts      No Known Allergies          /72   Pulse 97   Temp 98 6 °F (37 °C) (Tympanic)   Ht 5' 9" (1 753 m)   Wt 116 kg (256 lb)   SpO2 94%   BMI 37 80 kg/m²          Physical Exam   Constitutional: He is oriented to person, place, and time  He appears well-developed and well-nourished  No distress  HENT:   Head: Normocephalic and atraumatic  Right Ear: External ear normal    Left Ear: External ear normal    Nose: Nose normal    Mouth/Throat: Oropharynx is clear and moist  No oropharyngeal exudate  Eyes: Pupils are equal, round, and reactive to light  Conjunctivae and EOM are normal  No scleral icterus  Neck: Normal range of motion  Neck supple  No JVD present  Cardiovascular: Normal rate and regular rhythm  Murmur heard  Pulmonary/Chest: Effort normal and breath sounds normal  No respiratory distress  He has no wheezes  Abdominal: Soft  Bowel sounds are normal  He exhibits no distension  There is no tenderness  Musculoskeletal: He exhibits no edema, tenderness or deformity  Neurological: He is alert and oriented to person, place, and time  He displays normal reflexes  A sensory deficit is present  No cranial nerve deficit  He exhibits abnormal muscle tone  Coordination normal    Skin: Skin is warm  He is not diaphoretic  No erythema  Psychiatric: He has a normal mood and affect   His behavior is normal  Judgment and thought content normal

## 2020-07-14 DIAGNOSIS — G62.9 NEUROPATHY: Primary | ICD-10-CM

## 2020-07-14 RX ORDER — GABAPENTIN 300 MG/1
CAPSULE ORAL
Qty: 30 CAPSULE | Refills: 0 | Status: SHIPPED | OUTPATIENT
Start: 2020-07-14 | End: 2021-01-07 | Stop reason: ALTCHOICE

## 2020-08-19 DIAGNOSIS — I48.0 PAF (PAROXYSMAL ATRIAL FIBRILLATION) (HCC): ICD-10-CM

## 2020-08-19 DIAGNOSIS — I10 BENIGN ESSENTIAL HYPERTENSION: ICD-10-CM

## 2020-08-19 RX ORDER — APIXABAN 5 MG/1
TABLET, FILM COATED ORAL
Qty: 60 TABLET | Refills: 4 | Status: SHIPPED | OUTPATIENT
Start: 2020-08-19 | End: 2021-01-30 | Stop reason: SDUPTHER

## 2020-08-19 RX ORDER — SPIRONOLACTONE 25 MG/1
25 TABLET ORAL DAILY
Qty: 30 TABLET | Refills: 4 | Status: SHIPPED | OUTPATIENT
Start: 2020-08-19 | End: 2021-01-30 | Stop reason: SDUPTHER

## 2020-09-21 ENCOUNTER — OFFICE VISIT (OUTPATIENT)
Dept: CARDIOLOGY CLINIC | Facility: HOSPITAL | Age: 75
End: 2020-09-21
Payer: MEDICARE

## 2020-09-21 VITALS
OXYGEN SATURATION: 96 % | SYSTOLIC BLOOD PRESSURE: 140 MMHG | HEART RATE: 78 BPM | HEIGHT: 69 IN | DIASTOLIC BLOOD PRESSURE: 84 MMHG | BODY MASS INDEX: 37.8 KG/M2 | WEIGHT: 255.2 LBS

## 2020-09-21 DIAGNOSIS — G47.33 OBSTRUCTIVE SLEEP APNEA OF ADULT: ICD-10-CM

## 2020-09-21 DIAGNOSIS — I35.0 AORTIC STENOSIS, MILD: ICD-10-CM

## 2020-09-21 DIAGNOSIS — I47.1 PAROXYSMAL ATRIAL TACHYCARDIA (HCC): ICD-10-CM

## 2020-09-21 DIAGNOSIS — E78.5 DYSLIPIDEMIA: ICD-10-CM

## 2020-09-21 DIAGNOSIS — I25.10 NONOCCLUSIVE CORONARY ATHEROSCLEROSIS OF NATIVE CORONARY ARTERY: ICD-10-CM

## 2020-09-21 DIAGNOSIS — I10 BENIGN ESSENTIAL HYPERTENSION: ICD-10-CM

## 2020-09-21 DIAGNOSIS — I48.0 PAROXYSMAL ATRIAL FIBRILLATION (HCC): ICD-10-CM

## 2020-09-21 DIAGNOSIS — I50.32 CHRONIC DIASTOLIC CHF (CONGESTIVE HEART FAILURE) (HCC): Primary | ICD-10-CM

## 2020-09-21 DIAGNOSIS — E66.01 CLASS 2 SEVERE OBESITY DUE TO EXCESS CALORIES WITH SERIOUS COMORBIDITY AND BODY MASS INDEX (BMI) OF 37.0 TO 37.9 IN ADULT (HCC): ICD-10-CM

## 2020-09-21 PROCEDURE — 99214 OFFICE O/P EST MOD 30 MIN: CPT | Performed by: INTERNAL MEDICINE

## 2020-09-21 NOTE — PROGRESS NOTES
Cardiology Follow Up    [address] 1936 0/[birth year]18  [id number]  [address] [facility] [address] [address]    1  Chronic diastolic CHF (congestive heart failure) (HCC)     2  Paroxysmal atrial fibrillation (HCC)     3  Paroxysmal atrial tachycardia ([address] )     4  Nonocclusive coronary atherosclerosis of native coronary artery     5  Aortic stenosis, mild  Echo complete with contrast if indicated   6  Benign essential hypertension     7  Dyslipidemia     8  Obstructive sleep apnea of adult     9  Class 2 severe obesity due to excess calories with serious comorbidity and body mass index (BMI) of 37 0 to 37 9 in adult [facility])         Discussion/Summary:  Mr Keya Parsons is a pleasant 72-year-old male who presents to the office today for routine follow-up  Since his last visit he feels well  He appears euvolemic on exam today  No changes were made to his diuretic regimen  He should continue to weigh himself on a regular basis and call the office with any signs or symptoms of volume overload  A low-salt diet was reinforced  His blood pressure is elevated in the office today  He states he thought he was supposed to be taking 50 mg of losartan daily  I have asked that he resume 100 mg daily  His most recent lipids from earlier this year were reviewed  They are acceptable on his current statin regimen on which he will remain  He continues to maintain normal sinus rhythm  He will remain on his current AV edward blocking regimen and systemic anticoagulation with Eliquis  He remains noncompliant with CPAP due to inability to tolerate the mask  He does have mild aortic stenosis by echo in 2017  I have requested a repeat echocardiogram     He will return to the office in six months for ongoing evaluation       Interval History:   Mr Keya Parsons is a pleasant 72-year-old male who presents to the office today for routine follow-up  Since his last visit he has been feeling well  He does not participate in any formal physical activity but he is active in his yard and around his home  With the activity he performs he denies any exertional chest pain or shortness of breath  He does note lower extremity edema worse as the day goes on  He denies any other signs or symptoms of congestive heart failure including paroxysmal nocturnal dyspnea, orthopnea, acute weight gain or increasing abdominal girth  He does weigh himself at home regularly with a stable weight of 256 pounds  He denies any sensation of palpitations  He is maintained on systemic anticoagulation with Eliquis without any bleeding consequences or falls  He denies any lightheadedness, syncope or presyncope  He denies symptoms of claudication  He remains non-compliant with CPAP  Problem List     Aortic stenosis, mild    Chronic diastolic CHF (congestive heart failure) (HCC)    Coronary artery disease    Dyslipidemia    Hypertension    Obstructive sleep apnea of adult    Paroxysmal atrial fibrillation (HCC)    Paroxysmal atrial tachycardia ([facility])        Past Medical History:   Diagnosis Date    [facility]     Hypertension     Nonocclusive coronary atherosclerosis of native coronary artery 3/26/2015     Social History     Socioeconomic History    Marital status: /Civil Union     Spouse name: Not on file    Number of children: Not on file    Years of education: Not on file    Highest education level: Not on file   Occupational History    Not on file   Social Needs    Financial resource strain: Not on file    Food insecurity     Worry: Not on file     Inability: Not on file   [employer] needs     Medical: Not on file     Non-medical: Not on file   Tobacco Use    Smoking status: Former Smoker    Smokeless tobacco: Former User   Substance and Sexual Activity    Alcohol use:  Yes     Alcohol/week: 4 0 standard drinks     Types: 4 Cans of beer per week     Comment: daily    Drug use: No    Sexual activity: Not on file   Lifestyle    Physical activity     Days per week: Not on file     Minutes per session: Not on file    Stress: Not on file   Relationships    Social connections     Talks on phone: Not on file     Gets together: Not on file     Attends Worship service: Not on file     Active member of club or organization: Not on file     Attends meetings of clubs or organizations: Not on file     Relationship status: Not on file    Intimate partner violence     Fear of current or ex partner: Not on file     Emotionally abused: Not on file     Physically abused: Not on file     Forced sexual activity: Not on file   Other Topics Concern    Not on file   Social History Narrative    Dental care, regularly    Good dental hygiene    No advance directives    No caffeine use    Uses safety equipment - Seatbelts       Family History   Problem Relation Age of Onset    Diabetes Mother     Stroke Mother      Past Surgical History:   Procedure Laterality Date    APPENDECTOMY      BACK SURGERY  1996    disc repair    CARDIAC CATHETERIZATION      50% lesion of mid LAD, 40% lesion of first obtuse marginal lesion  Last assessed 1/17/2018     CHOLECYSTECTOMY      COLON SURGERY      colon ressection    COLONOSCOPY N/A 1/14/2019    Procedure: COLONOSCOPY;  Surgeon: Radha Russell MD;  Location: [facility];  Service: Colorectal    CYSTOSCOPY      Diagnostic     HEMORRHOID SURGERY  08/2011    TONSILLECTOMY         Current Outpatient Medications:     colesevelam (WELCHOL) 625 mg tablet, TAKE ONE TABLET BY MOUTH TWICE A DAY  (Patient taking differently: Take 625 mg by mouth daily ), Disp: 60 tablet, Rfl: 0    Eliquis 5 MG, TAKE ONE TABLET BY MOUTH TWICE A DAY , Disp: 60 tablet, Rfl: 4    folic acid (FOLVITE) 1 mg tablet, TAKE (1) TABLET BY MOUTH DAILY  , Disp: 30 tablet, Rfl: 4    furosemide (LASIX) 40 mg tablet, Take 1 tablet (40 mg total) by mouth 2 (two) times a day, Disp: 180 tablet, Rfl: 3    gabapentin (NEURONTIN) 300 mg capsule, Take 1 capsule (300 mg total) by mouth daily at bedtime, Disp: 30 capsule, Rfl: 0    lisinopril (ZESTRIL) 20 mg tablet, Take 1 tablet (20 mg total) by mouth 2 (two) times a day, Disp: 180 tablet, Rfl: 3    losartan (COZAAR) 100 MG tablet, Take 1 tablet (100 mg total) by mouth daily (Patient taking differently: Take 50 mg by mouth daily ), Disp: 30 tablet, Rfl: 4    NIFEdipine (PROCARDIA XL) 90 mg 24 hr tablet, TAKE 1 TABLET DAILY  , Disp: 30 tablet, Rfl: 4    omeprazole (PriLOSEC) 20 mg delayed release capsule, TAKE (1) CAPSULE DAILY  , Disp: 30 capsule, Rfl: 4    predniSONE 5 mg tablet, TAKE ONE TABLET DAILY  , Disp: 30 tablet, Rfl: 4    rosuvastatin (CRESTOR) 20 MG tablet, TAKE ONE TABLET BY MOUTH DAILY, Disp: 30 tablet, Rfl: 4    spironolactone (ALDACTONE) 25 mg tablet, Take 1 tablet (25 mg total) by mouth daily, Disp: 30 tablet, Rfl: 4  No Known Allergies    Labs:     Chemistry        Component Value Date/Time     12/31/2015 0705    K 4 8 07/01/2020 0815    K 4 3 12/31/2015 0705     07/01/2020 0815     12/31/2015 0705    CO2 28 07/01/2020 0815    CO2 27 9 12/31/2015 0705    BUN 21 07/01/2020 0815    BUN 14 12/31/2015 0705    CREATININE 1 30 07/01/2020 0815    CREATININE 0 83 12/31/2015 0705        Component Value Date/Time    CALCIUM 9 0 07/01/2020 0815    CALCIUM 8 6 12/31/2015 0705    ALKPHOS 47 07/01/2020 0815    ALKPHOS 51 12/31/2015 0705    AST 35 07/01/2020 0815    AST 29 12/31/2015 0705    ALT 55 07/01/2020 0815    ALT 48 12/31/2015 0705    BILITOT 1 07 (H) 12/31/2015 0705            Lab Results   Component Value Date    CHOL 213 12/31/2015    CHOL 226 03/12/2015     Lab Results   Component Value Date    HDL 82 07/01/2020    HDL 79 (H) 08/13/2019    HDL 62 (H) 06/20/2019     Lab Results   Component Value Date    LDLCALC 54 07/01/2020    LDLCALC 42 08/13/2019 LDLCALC 67 06/20/2019     Lab Results   Component Value Date    TRIG 102 07/01/2020    TRIG 54 08/13/2019    TRIG 89 06/20/2019     No results found for: CHOLHDL    Imaging: No results found  Review of Systems   Cardiovascular: Positive for leg swelling  Negative for chest pain, claudication, cyanosis, dyspnea on exertion and orthopnea  All other systems reviewed and are negative  Vitals:    09/21/20 1404   BP: 140/84   Pulse: 78   SpO2: 96%     Vitals:    09/21/20 1404   Weight: 116 kg (255 lb 3 2 oz)     Height: 5' 9" (175 3 cm)   Body mass index is 37 69 kg/m²      Physical Exam:  General:  Alert and cooperative, appears stated age  [de-identified]:  PERRLA, EOMI, no scleral icterus, no conjunctival pallor  Neck:  No lymphadenopathy, no thyromegaly, no carotid bruits, no elevated JVP  Heart:  Regular rate and rhythm, normal S1/S2, 2/6 mid-peaking systolic ejection murmur RUSB with radiation to the carotids  Lungs:  Clear to auscultation bilaterally   Abdomen:  Soft, non-tender, positive bowel sounds, no rebound or guarding,   no organomegaly   Extremities:  No clubbing, cyanosis or edema   Vascular:  2+ pedal pulses  Skin:  No rashes or lesions on exposed skin  Neurologic:  Cranial nerves II-XII grossly intact without focal deficits

## 2020-10-05 ENCOUNTER — HOSPITAL ENCOUNTER (OUTPATIENT)
Dept: NON INVASIVE DIAGNOSTICS | Facility: HOSPITAL | Age: 75
Discharge: HOME | End: 2020-10-05
Attending: INTERNAL MEDICINE
Payer: MEDICARE

## 2020-10-05 DIAGNOSIS — I35.0 AORTIC STENOSIS, MILD: ICD-10-CM

## 2020-10-05 PROCEDURE — C8929 TTE W OR WO FOL WCON,DOPPLER: HCPCS

## 2020-10-05 PROCEDURE — 93306 TTE W/DOPPLER COMPLETE: CPT | Performed by: INTERNAL MEDICINE

## 2020-10-05 RX ADMIN — PERFLUTREN 3 ML/MIN: 6.52 INJECTION, SUSPENSION INTRAVENOUS at 13:30

## 2020-10-16 DIAGNOSIS — M19.90 ARTHRITIS: ICD-10-CM

## 2020-10-16 DIAGNOSIS — K21.9 GASTROESOPHAGEAL REFLUX DISEASE: ICD-10-CM

## 2020-10-16 DIAGNOSIS — I10 ESSENTIAL HYPERTENSION: ICD-10-CM

## 2020-10-16 DIAGNOSIS — E78.5 DYSLIPIDEMIA: ICD-10-CM

## 2020-10-16 RX ORDER — OMEPRAZOLE 20 MG/1
CAPSULE, DELAYED RELEASE ORAL
Qty: 30 CAPSULE | Refills: 4 | Status: SHIPPED | OUTPATIENT
Start: 2020-10-16 | End: 2021-04-01

## 2020-10-16 RX ORDER — FOLIC ACID 1 MG/1
TABLET ORAL
Qty: 30 TABLET | Refills: 4 | Status: SHIPPED | OUTPATIENT
Start: 2020-10-16 | End: 2021-04-01

## 2020-10-16 RX ORDER — ROSUVASTATIN CALCIUM 20 MG/1
TABLET, COATED ORAL
Qty: 30 TABLET | Refills: 4 | Status: SHIPPED | OUTPATIENT
Start: 2020-10-16 | End: 2021-04-01

## 2020-10-16 RX ORDER — PREDNISONE 1 MG/1
TABLET ORAL
Qty: 30 TABLET | Refills: 4 | Status: SHIPPED | OUTPATIENT
Start: 2020-10-16 | End: 2021-04-01

## 2020-10-16 RX ORDER — NIFEDIPINE 90 MG/1
TABLET, EXTENDED RELEASE ORAL
Qty: 30 TABLET | Refills: 4 | Status: SHIPPED | OUTPATIENT
Start: 2020-10-16 | End: 2021-04-01

## 2020-10-21 DIAGNOSIS — E78.2 MIXED HYPERLIPIDEMIA: ICD-10-CM

## 2020-10-21 RX ORDER — COLESEVELAM 180 1/1
625 TABLET ORAL DAILY
Qty: 90 TABLET | Refills: 3 | Status: SHIPPED | OUTPATIENT
Start: 2020-10-21

## 2020-11-18 DIAGNOSIS — I10 ESSENTIAL HYPERTENSION: ICD-10-CM

## 2020-11-18 RX ORDER — LOSARTAN POTASSIUM 100 MG/1
50 TABLET ORAL DAILY
Qty: 90 TABLET | Refills: 3 | Status: SHIPPED | OUTPATIENT
Start: 2020-11-18 | End: 2020-11-21

## 2020-11-20 DIAGNOSIS — I10 ESSENTIAL HYPERTENSION: ICD-10-CM

## 2020-11-21 RX ORDER — LOSARTAN POTASSIUM 100 MG/1
TABLET ORAL
Qty: 30 TABLET | Refills: 4 | Status: SHIPPED | OUTPATIENT
Start: 2020-11-21 | End: 2021-04-29

## 2021-01-07 ENCOUNTER — OFFICE VISIT (OUTPATIENT)
Dept: INTERNAL MEDICINE CLINIC | Facility: CLINIC | Age: 76
End: 2021-01-07
Payer: MEDICARE

## 2021-01-07 VITALS
DIASTOLIC BLOOD PRESSURE: 78 MMHG | BODY MASS INDEX: 36.94 KG/M2 | TEMPERATURE: 97.2 F | OXYGEN SATURATION: 95 % | WEIGHT: 249.38 LBS | SYSTOLIC BLOOD PRESSURE: 124 MMHG | HEART RATE: 86 BPM | HEIGHT: 69 IN

## 2021-01-07 DIAGNOSIS — I15.0 RENOVASCULAR HYPERTENSION: Primary | ICD-10-CM

## 2021-01-07 DIAGNOSIS — E78.2 MIXED HYPERLIPIDEMIA: ICD-10-CM

## 2021-01-07 DIAGNOSIS — Z86.010 HISTORY OF COLON POLYPS: ICD-10-CM

## 2021-01-07 DIAGNOSIS — I10 ESSENTIAL HYPERTENSION: ICD-10-CM

## 2021-01-07 DIAGNOSIS — M05.79 RHEUMATOID ARTHRITIS INVOLVING MULTIPLE SITES WITH POSITIVE RHEUMATOID FACTOR (HCC): ICD-10-CM

## 2021-01-07 PROBLEM — G57.02 PIRIFORMIS SYNDROME OF LEFT SIDE: Status: RESOLVED | Noted: 2019-08-27 | Resolved: 2021-01-07

## 2021-01-07 PROBLEM — I73.9 INTERMITTENT CLAUDICATION (HCC): Status: RESOLVED | Noted: 2017-09-19 | Resolved: 2021-01-07

## 2021-01-07 PROBLEM — M54.50 ACUTE LEFT-SIDED LOW BACK PAIN WITHOUT SCIATICA: Status: RESOLVED | Noted: 2019-08-27 | Resolved: 2021-01-07

## 2021-01-07 PROCEDURE — 99214 OFFICE O/P EST MOD 30 MIN: CPT | Performed by: INTERNAL MEDICINE

## 2021-01-07 NOTE — PROGRESS NOTES
Assessment/Plan:         Diagnoses and all orders for this visit:    Renovascular hypertension  Pt Bp stable Encouraged lo sodium diet and weight loss He is trying to stay active     Essential hypertension  -     Comprehensive metabolic panel; Future  -     CBC and differential; Future    Mixed hyperlipidemia  -     LDL cholesterol, direct; Future  Low fat diet weight loss encouraged     History of colon polyps  Pt is due this year for repeat colon and will schedule - he is not sure if he got letter as of yet to setup   Sent referral to colorectal who did last colon screen    Rheumatoid arthritis involving multiple sites with positive rheumatoid factor ([place] [id number] )  Pt stable overall He is staying active and using his hands/trying to walk for exercise       6 months/awv     Patient ID: Ricardo Zavala  is a 70 y o  male  HPI  Pt generally well He went hunting and no chest pain or sob No recurrent lbp He is trying to stay active but does not go out often due to covid No change in bowels or bladder function No numbness or tingling He is sleeping 6-7 hours/night and feels more rested       Review of Systems   Constitutional: Negative for activity change, chills and fever  HENT: Negative  Respiratory: Negative  Negative for cough and shortness of breath  Cardiovascular: Negative for chest pain, palpitations and leg swelling  Gastrointestinal: Negative  Negative for abdominal distention, abdominal pain, blood in stool and constipation  Genitourinary: Negative  Musculoskeletal: Positive for arthralgias  Negative for back pain and gait problem  Skin: Negative  Neurological: Negative for dizziness, light-headedness, numbness and headaches  Psychiatric/Behavioral: Negative for sleep disturbance         Past Medical History:   Diagnosis Date    Atrial fibrillation ([facility] )     Hypertension     Nonocclusive coronary atherosclerosis of native coronary artery 3/26/2015     Past Surgical History:   Procedure Laterality Date    APPENDECTOMY      BACK SURGERY  1996    disc repair    CARDIAC CATHETERIZATION      50% lesion of mid LAD, 40% lesion of first obtuse marginal lesion  Last assessed 1/17/2018     CHOLECYSTECTOMY      COLON SURGERY      colon ressection    COLONOSCOPY N/A 1/14/2019    Procedure: COLONOSCOPY;  Surgeon: Chrystal Sinha MD;  [facility]: [facility];  Service: Colorectal    CYSTOSCOPY      Diagnostic     HEMORRHOID SURGERY  08/2011    TONSILLECTOMY       Social History     Socioeconomic History    Marital status: /Civil Union     Spouse name: Not on file    Number of children: Not on file    Years of education: Not on file    Highest education level: Not on file   Occupational History    Not on file   Social Needs    Financial resource strain: Not on file    Food insecurity     Worry: Not on file     Inability: Not on file    Transportation needs     Medical: Not on file     Non-medical: Not on file   Tobacco Use    Smoking status: Former Smoker    Smokeless tobacco: Former User   Substance and Sexual Activity    Alcohol use:  Yes     Alcohol/week: 4 0 standard drinks     Types: 4 Cans of beer per week     Comment: daily    Drug use: No    Sexual activity: Not on file   Lifestyle    Physical activity     Days per week: Not on file     Minutes per session: Not on file    Stress: Not on file   Relationships    Social connections     Talks on phone: Not on file     Gets together: Not on file     Attends [employer] service: Not on file     Active member of club or organization: Not on file     Attends meetings of clubs or organizations: Not on file     Relationship status: Not on file    Intimate partner violence     Fear of current or ex partner: Not on file     Emotionally abused: Not on file     Physically abused: Not on file     Forced sexual activity: Not on file   Other Topics Concern    Not on file   Social History Narrative    Dental care, regularly    Good dental hygiene    No advance directives    No caffeine use    Uses safety equipment - Seatbelts      No Known Allergies  BMI Counseling: Body mass index is 36 83 kg/m²  The BMI is above normal  Nutrition recommendations include consuming healthier snacks and moderation in carbohydrate intake  Exercise recommendations include exercising 3-5 times per week  No pharmacotherapy was ordered  /78   Pulse 86   Temp (!) 97 2 °F (36 2 °C) (Temporal)   Ht 5' 9" (1 753 m)   Wt 113 kg (249 lb 6 oz)   SpO2 95%   BMI 36 83 kg/m²          Physical Exam  Vitals signs reviewed  Constitutional:       General: He is not in acute distress  Appearance: Normal appearance  He is not ill-appearing, toxic-appearing or diaphoretic  HENT:      Head: Normocephalic and atraumatic  Right Ear: Tympanic membrane and ear canal normal       Left Ear: Tympanic membrane and ear canal normal       Nose: Nose normal       Mouth/Throat:      Mouth: Mucous membranes are dry  Eyes:      General: No scleral icterus  Right eye: No discharge  Left eye: No discharge  Extraocular Movements: Extraocular movements intact  Conjunctiva/sclera: Conjunctivae normal       Pupils: Pupils are equal, round, and reactive to light  Neck:      Musculoskeletal: Normal range of motion and neck supple  No neck rigidity  Cardiovascular:      Rate and Rhythm: Normal rate and regular rhythm  Pulses: Normal pulses  Heart sounds: Normal heart sounds  No murmur  Pulmonary:      Effort: Pulmonary effort is normal  No respiratory distress  Breath sounds: Normal breath sounds  No wheezing  Abdominal:      General: Bowel sounds are normal  There is no distension  Palpations: Abdomen is soft  Tenderness: There is no abdominal tenderness  Musculoskeletal:         General: No swelling or tenderness  Right lower leg: No edema  Left lower leg: No edema     Lymphadenopathy:      Cervical: No cervical adenopathy  Skin:     General: Skin is dry  Coloration: Skin is not jaundiced  Neurological:      General: No focal deficit present  Mental Status: He is alert and oriented to person, place, and time  Mental status is at baseline  Cranial Nerves: No cranial nerve deficit  Psychiatric:         Mood and Affect: Mood normal          Behavior: Behavior normal          Thought Content:  Thought content normal          Judgment: Judgment normal

## 2021-01-29 DIAGNOSIS — I48.0 PAF (PAROXYSMAL ATRIAL FIBRILLATION) (HCC): ICD-10-CM

## 2021-01-29 DIAGNOSIS — I10 BENIGN ESSENTIAL HYPERTENSION: ICD-10-CM

## 2021-01-29 DIAGNOSIS — I10 ESSENTIAL HYPERTENSION: ICD-10-CM

## 2021-01-30 RX ORDER — APIXABAN 5 MG/1
TABLET, FILM COATED ORAL
Qty: 60 TABLET | Refills: 4 | Status: SHIPPED | OUTPATIENT
Start: 2021-01-30 | End: 2021-06-30

## 2021-01-30 RX ORDER — LISINOPRIL 20 MG/1
TABLET ORAL
Qty: 60 TABLET | Refills: 4 | Status: SHIPPED | OUTPATIENT
Start: 2021-01-30 | End: 2021-04-29

## 2021-01-30 RX ORDER — SPIRONOLACTONE 25 MG/1
25 TABLET ORAL DAILY
Qty: 30 TABLET | Refills: 4 | Status: SHIPPED | OUTPATIENT
Start: 2021-01-30 | End: 2021-06-30

## 2021-02-03 ENCOUNTER — TELEPHONE (OUTPATIENT)
Dept: INTERNAL MEDICINE CLINIC | Facility: CLINIC | Age: 76
End: 2021-02-03

## 2021-02-03 NOTE — TELEPHONE ENCOUNTER
Pharmacy called that a flag is popping up that pt is taking losartan and lisinopril and he is just verifying  Please advise

## 2021-02-03 NOTE — TELEPHONE ENCOUNTER
He has been on both and is followed by cardiology as well  He is due for repeat labs so will double check kidney function staying same range or may need to adjust

## 2021-02-09 ENCOUNTER — TELEPHONE (OUTPATIENT)
Dept: INTERNAL MEDICINE CLINIC | Facility: CLINIC | Age: 76
End: 2021-02-09

## 2021-02-09 NOTE — TELEPHONE ENCOUNTER
Pt wife called and said he is getting cataract surgery on feb 17th, where should I put him on the sched?

## 2021-02-09 NOTE — TELEPHONE ENCOUNTER
I just saw him so can get the form from the eye doctor  We need to get asap because some of the eye doctors require ekg done prior

## 2021-03-09 DIAGNOSIS — Z23 ENCOUNTER FOR IMMUNIZATION: ICD-10-CM

## 2021-03-25 ENCOUNTER — IMMUNIZATIONS (OUTPATIENT)
Dept: FAMILY MEDICINE CLINIC | Facility: HOSPITAL | Age: 76
End: 2021-03-25

## 2021-03-25 DIAGNOSIS — Z23 ENCOUNTER FOR IMMUNIZATION: Primary | ICD-10-CM

## 2021-03-25 PROCEDURE — 91301 SARS-COV-2 / COVID-19 MRNA VACCINE (MODERNA) 100 MCG: CPT

## 2021-03-25 PROCEDURE — 0011A SARS-COV-2 / COVID-19 MRNA VACCINE (MODERNA) 100 MCG: CPT

## 2021-04-01 DIAGNOSIS — K21.9 GASTROESOPHAGEAL REFLUX DISEASE: ICD-10-CM

## 2021-04-01 DIAGNOSIS — E78.5 DYSLIPIDEMIA: ICD-10-CM

## 2021-04-01 DIAGNOSIS — I10 ESSENTIAL HYPERTENSION: ICD-10-CM

## 2021-04-01 DIAGNOSIS — M19.90 ARTHRITIS: ICD-10-CM

## 2021-04-01 RX ORDER — PREDNISONE 1 MG/1
TABLET ORAL
Qty: 30 TABLET | Refills: 4 | Status: SHIPPED | OUTPATIENT
Start: 2021-04-01 | End: 2021-09-08 | Stop reason: SDUPTHER

## 2021-04-01 RX ORDER — OMEPRAZOLE 20 MG/1
CAPSULE, DELAYED RELEASE ORAL
Qty: 30 CAPSULE | Refills: 4 | Status: SHIPPED | OUTPATIENT
Start: 2021-04-01 | End: 2021-09-08 | Stop reason: SDUPTHER

## 2021-04-01 RX ORDER — NIFEDIPINE 90 MG/1
TABLET, EXTENDED RELEASE ORAL
Qty: 30 TABLET | Refills: 4 | Status: SHIPPED | OUTPATIENT
Start: 2021-04-01 | End: 2021-09-01

## 2021-04-01 RX ORDER — ROSUVASTATIN CALCIUM 20 MG/1
TABLET, COATED ORAL
Qty: 30 TABLET | Refills: 4 | Status: SHIPPED | OUTPATIENT
Start: 2021-04-01 | End: 2021-09-01

## 2021-04-01 RX ORDER — FOLIC ACID 1 MG/1
TABLET ORAL
Qty: 30 TABLET | Refills: 4 | Status: SHIPPED | OUTPATIENT
Start: 2021-04-01 | End: 2021-09-08 | Stop reason: SDUPTHER

## 2021-04-19 NOTE — PROGRESS NOTES
Cardiology Follow Up    Jean Paul Hanna    1945  [id number]  [facility] [phone] [id number] [phone]    1  Paroxysmal atrial fibrillation (HCC)  POCT ECG    TSH, 3rd generation with Free T4 reflex    Basic metabolic panel    Magnesium   2  Paroxysmal atrial tachycardia ([address] )     3  Nonocclusive coronary atherosclerosis of native coronary artery     4  Chronic diastolic (congestive) heart failure ([facility] )     5  Moderate aortic stenosis     6  Essential hypertension     7  Dyslipidemia     8  Pre-operative cardiovascular examination  POCT ECG       Discussion/Summary:  Overall since his last office visit he has been feeling well  He offers no cardiopulmonary complaints  EKG shows atrial fibrillation with mildly fast rates - heart rate 102 bpm  Temporarily I will increase his nifedipine to twice daily  His wife will check his blood pressure and pulse daily and call the office in one week with updated readings  Given his recent bout of diarrhea I will check his electrolytes for completeness  I will also check a TSH  Continue anticoagulation with Eliquis - may hold for 2 days prior to surgery  He appears euvolemic on exam  He will continue his current diuretic regimen pending above labs  continue daily weights and low sodium diet  Since his last office visit he underwent an echocardiogram which revealed progression of his aortic stenosis to moderate  This will require surveillance  He is asymptomatic  His blood pressure is well controlled  Lipids are at goal on current dose of statin  Given the above, he can proceed with upcoming eye surgery at acceptable cardiac risk without any further cardiac testing  He may hold Eliquis 2 days prior to procedure  He will RTO in 6 months with Dr Eldon Hood or sooner if necessary  He will call with any concerns       Interval History:   Shirin Inez Robin  is a 70 y o  male with paroxysmal atrial fibrillation on anticoagulation, nonobstructive CAD, chronic diastolic congestive heart failure, moderate aortic stenosis, hypertension, dyslipidemia, obesity who presents to the office today for routine follow up and for cardiac clearance  He is to undergo right lower eyelid intropion repair with right eye excision of a conjunctival lesion on May 3rd  Since his last office visit he has been feeling well  He has been active around his house  With the activity he performs he denies any significant shortness of breath  He denies chest pain  He does weigh himself daily  He reports last month he had several days of diarrhea and reports losing 20 lbs  He has since regained some of that weight back  His most recent weight today was 250 lbs which is less than his stated dry weight in September 2020  He denies worsening lower extremity edema, abdominal bloating, orthopnea, and PND  He denies lightheadedness, dizziness, and syncope  He denies palpitations  He is tolerating anticoagulation       Problem List     Aortic stenosis, mild    Chronic diastolic CHF (congestive heart failure) ([place] [address] )    Wt Readings from Last 3 Encounters:   04/20/21 114 kg (251 lb 9 6 oz)   01/07/21 113 kg (249 lb 6 oz)   09/21/20 116 kg (255 lb 3 2 oz)                 Nonocclusive coronary atherosclerosis of native coronary artery    Dyslipidemia    Essential hypertension    Obstructive sleep apnea of adult    Paroxysmal atrial fibrillation (HCC)    Paroxysmal atrial tachycardia (HCC)    Allergic rhinitis due to pollen    Erectile dysfunction    GERD without esophagitis    Obesity    Renovascular hypertension    Rheumatoid arthritis ([facility] )    History of colon polyps    Medicare annual wellness visit, subsequent    Mixed hyperlipidemia        Past Medical History:   Diagnosis Date    Atrial fibrillation ([facility] )     Hypertension     Nonocclusive coronary atherosclerosis of native coronary artery 3/26/2015     Social History Socioeconomic History    Marital status: /Civil Union     Spouse name: Not on file    Number of children: Not on file    Years of education: Not on file    Highest education level: Not on file   Occupational History    Not on file   Social Needs    Financial resource strain: Not on file    Food insecurity     Worry: Not on file     Inability: Not on file    Transportation needs     Medical: Not on file     Non-medical: Not on file   Tobacco Use    Smoking status: Former Smoker    Smokeless tobacco: Former User   Substance and Sexual Activity    Alcohol use: Yes     Alcohol/week: 4 0 standard drinks     Types: 4 Cans of beer per week     Comment: daily    Drug use: No    Sexual activity: Not on file   Lifestyle    Physical activity     Days per week: Not on file     Minutes per session: Not on file    Stress: Not on file   Relationships    Social connections     Talks on phone: Not on file     Gets together: Not on file     Attends Voodoo service: Not on file     Active member of club or organization: Not on file     Attends meetings of clubs or organizations: Not on file     Relationship status: Not on file    Intimate partner violence     Fear of current or ex partner: Not on file     Emotionally abused: Not on file     Physically abused: Not on file     Forced sexual activity: Not on file   Other Topics Concern    Not on file   Social History Narrative    Dental care, regularly    Good dental hygiene    No advance directives    No caffeine use    Uses safety equipment - Seatbelts       Family History   Problem Relation Age of Onset    Diabetes Mother     Stroke Mother      Past Surgical History:   Procedure Laterality Date    APPENDECTOMY      BACK SURGERY  1996    disc repair    CARDIAC CATHETERIZATION      50% lesion of mid LAD, 40% lesion of first obtuse marginal lesion   Last assessed 1/17/2018     CHOLECYSTECTOMY      COLON SURGERY      colon ressection    COLONOSCOPY N/A 1/14/2019    Procedure: COLONOSCOPY;  Surgeon: Joyce Lepe MD;  Location: [facility];  Service: Colorectal    CYSTOSCOPY      Diagnostic     HEMORRHOID SURGERY  08/2011    TONSILLECTOMY         Current Outpatient Medications:     colesevelam (WELCHOL) 625 mg tablet, Take 1 tablet (625 mg total) by mouth daily, Disp: 90 tablet, Rfl: 3    Eliquis 5 MG, TAKE ONE TABLET BY MOUTH TWICE A DAY , Disp: 60 tablet, Rfl: 4    folic acid (FOLVITE) 1 mg tablet, TAKE (1) TABLET BY MOUTH DAILY  , Disp: 30 tablet, Rfl: 4    furosemide (LASIX) 40 mg tablet, Take 1 tablet (40 mg total) by mouth 2 (two) times a day, Disp: 180 tablet, Rfl: 3    lisinopril (ZESTRIL) 20 mg tablet, TAKE (1) TABLET BY MOUTH TWICE DAILY  , Disp: 60 tablet, Rfl: 4    losartan (COZAAR) 100 MG tablet, TAKE (1) TABLET BY MOUTH DAILY  , Disp: 30 tablet, Rfl: 4    NIFEdipine (PROCARDIA XL) 90 mg 24 hr tablet, TAKE 1 TABLET DAILY  , Disp: 30 tablet, Rfl: 4    omeprazole (PriLOSEC) 20 mg delayed release capsule, TAKE (1) CAPSULE DAILY  , Disp: 30 capsule, Rfl: 4    predniSONE 5 mg tablet, TAKE ONE TABLET DAILY  , Disp: 30 tablet, Rfl: 4    rosuvastatin (CRESTOR) 20 MG tablet, TAKE ONE TABLET BY MOUTH DAILY  , Disp: 30 tablet, Rfl: 4    spironolactone (ALDACTONE) 25 mg tablet, Take 1 tablet (25 mg total) by mouth daily, Disp: 30 tablet, Rfl: 4  No Known Allergies    Labs:     Chemistry        Component Value Date/Time     12/31/2015 0705    K 4 8 07/01/2020 0815    K 4 3 12/31/2015 0705     07/01/2020 0815     12/31/2015 0705    CO2 28 07/01/2020 0815    CO2 27 9 12/31/2015 0705    BUN 21 07/01/2020 0815    BUN 14 12/31/2015 0705    CREATININE 1 30 07/01/2020 0815    CREATININE 0 83 12/31/2015 0705        Component Value Date/Time    CALCIUM 9 0 07/01/2020 0815    CALCIUM 8 6 12/31/2015 0705    ALKPHOS 47 07/01/2020 0815    ALKPHOS 51 12/31/2015 0705    AST 35 07/01/2020 0815    AST 29 12/31/2015 0705    ALT 55 07/01/2020 0815 ALT 48 12/31/2015 0705    BILITOT 1 07 (H) 12/31/2015 0705            Lab Results   Component Value Date    CHOL 213 12/31/2015    CHOL 226 03/12/2015     Lab Results   Component Value Date    HDL 82 07/01/2020    HDL 79 (H) 08/13/2019    HDL 62 (H) 06/20/2019     Lab Results   Component Value Date    LDLCALC 54 07/01/2020    LDLCALC 42 08/13/2019    LDLCALC 67 06/20/2019     Lab Results   Component Value Date    TRIG 102 07/01/2020    TRIG 54 08/13/2019    TRIG 89 06/20/2019     No results found for: CHOLHDL    Imaging: No results found  ECG:  Atrial fibrillation with rapid ventricular response  Low voltage QRS      Review of Systems   Constitution: Negative for chills and fever  HENT: Negative  Cardiovascular: Negative for chest pain, dyspnea on exertion, leg swelling, orthopnea, palpitations, paroxysmal nocturnal dyspnea and syncope  Respiratory: Negative for cough and shortness of breath  Gastrointestinal: Negative for diarrhea, nausea and vomiting  Genitourinary: Negative  Neurological: Negative for dizziness and light-headedness  All other systems reviewed and are negative  Vitals:    04/20/21 1222   BP: 122/76   Pulse: 102     Vitals:    04/20/21 1222   Weight: 114 kg (251 lb 9 6 oz)     Height: 5' 9" (175 3 cm)   Body mass index is 37 15 kg/m²  Physical Exam:  Physical Exam  Vitals signs reviewed  Constitutional:       General: He is not in acute distress  Appearance: He is well-developed  He is obese  He is not diaphoretic  HENT:      Head: Normocephalic and atraumatic  Eyes:      Pupils: Pupils are equal, round, and reactive to light  Neck:      Musculoskeletal: Normal range of motion  Vascular: No carotid bruit  Cardiovascular:      Rate and Rhythm: Normal rate  Rhythm irregularly irregular  Pulses:           Radial pulses are 2+ on the right side and 2+ on the left side  Dorsalis pedis pulses are 2+ on the right side and 2+ on the left side  Heart sounds: S1 normal and S2 normal  Murmur present  Systolic murmur present with a grade of 2/6  Comments: 2/6 systolic murmur @ RUSB  Pulmonary:      Effort: Pulmonary effort is normal  No respiratory distress  Breath sounds: Normal breath sounds  No wheezing or rales  Abdominal:      General: There is no distension  Palpations: Abdomen is soft  Tenderness: There is no abdominal tenderness  Musculoskeletal: Normal range of motion  Right lower leg: No edema  Left lower leg: No edema  Skin:     General: Skin is warm and dry  Findings: No erythema  Neurological:      General: No focal deficit present  Mental Status: He is alert and oriented to person, place, and time     Psychiatric:         Mood and Affect: Mood normal          Behavior: Behavior normal

## 2021-04-20 ENCOUNTER — TELEPHONE (OUTPATIENT)
Dept: CARDIOLOGY CLINIC | Facility: HOSPITAL | Age: 76
End: 2021-04-20

## 2021-04-20 ENCOUNTER — APPOINTMENT (OUTPATIENT)
Dept: LAB | Facility: HOSPITAL | Age: 76
End: 2021-04-20
Payer: MEDICARE

## 2021-04-20 ENCOUNTER — OFFICE VISIT (OUTPATIENT)
Dept: CARDIOLOGY CLINIC | Facility: HOSPITAL | Age: 76
End: 2021-04-20
Payer: MEDICARE

## 2021-04-20 VITALS
HEART RATE: 102 BPM | WEIGHT: 251.6 LBS | HEIGHT: 69 IN | DIASTOLIC BLOOD PRESSURE: 76 MMHG | SYSTOLIC BLOOD PRESSURE: 122 MMHG | BODY MASS INDEX: 37.26 KG/M2

## 2021-04-20 DIAGNOSIS — I47.1 PAROXYSMAL ATRIAL TACHYCARDIA (HCC): ICD-10-CM

## 2021-04-20 DIAGNOSIS — Z01.810 PRE-OPERATIVE CARDIOVASCULAR EXAMINATION: ICD-10-CM

## 2021-04-20 DIAGNOSIS — I50.32 CHRONIC DIASTOLIC (CONGESTIVE) HEART FAILURE (HCC): ICD-10-CM

## 2021-04-20 DIAGNOSIS — I48.0 PAROXYSMAL ATRIAL FIBRILLATION (HCC): Primary | ICD-10-CM

## 2021-04-20 DIAGNOSIS — I25.10 NONOCCLUSIVE CORONARY ATHEROSCLEROSIS OF NATIVE CORONARY ARTERY: ICD-10-CM

## 2021-04-20 DIAGNOSIS — I10 ESSENTIAL HYPERTENSION: ICD-10-CM

## 2021-04-20 DIAGNOSIS — I35.0 MODERATE AORTIC STENOSIS: ICD-10-CM

## 2021-04-20 DIAGNOSIS — E78.5 DYSLIPIDEMIA: ICD-10-CM

## 2021-04-20 DIAGNOSIS — N17.9 ACUTE KIDNEY INJURY (HCC): Primary | ICD-10-CM

## 2021-04-20 LAB
ANION GAP SERPL CALCULATED.3IONS-SCNC: 11 MMOL/L (ref 4–13)
BUN SERPL-MCNC: 35 MG/DL (ref 5–25)
CALCIUM SERPL-MCNC: 9 MG/DL (ref 8.3–10.1)
CHLORIDE SERPL-SCNC: 101 MMOL/L (ref 100–108)
CO2 SERPL-SCNC: 28 MMOL/L (ref 21–32)
CREAT SERPL-MCNC: 1.54 MG/DL (ref 0.6–1.3)
GFR SERPL CREATININE-BSD FRML MDRD: 44 ML/MIN/1.73SQ M
GLUCOSE SERPL-MCNC: 121 MG/DL (ref 65–140)
MAGNESIUM SERPL-MCNC: 1.9 MG/DL (ref 1.6–2.6)
POTASSIUM SERPL-SCNC: 4.3 MMOL/L (ref 3.5–5.3)
SODIUM SERPL-SCNC: 140 MMOL/L (ref 136–145)
TSH SERPL DL<=0.05 MIU/L-ACNC: 1.16 UIU/ML (ref 0.36–3.74)

## 2021-04-20 PROCEDURE — 93000 ELECTROCARDIOGRAM COMPLETE: CPT | Performed by: PHYSICIAN ASSISTANT

## 2021-04-20 PROCEDURE — 36415 COLL VENOUS BLD VENIPUNCTURE: CPT | Performed by: PHYSICIAN ASSISTANT

## 2021-04-20 PROCEDURE — 80048 BASIC METABOLIC PNL TOTAL CA: CPT | Performed by: PHYSICIAN ASSISTANT

## 2021-04-20 PROCEDURE — 99214 OFFICE O/P EST MOD 30 MIN: CPT | Performed by: PHYSICIAN ASSISTANT

## 2021-04-20 PROCEDURE — 84443 ASSAY THYROID STIM HORMONE: CPT | Performed by: PHYSICIAN ASSISTANT

## 2021-04-20 PROCEDURE — 83735 ASSAY OF MAGNESIUM: CPT | Performed by: PHYSICIAN ASSISTANT

## 2021-04-20 NOTE — TELEPHONE ENCOUNTER
Called patient with lab results  Labs show an PRESTON, likely due to diarrhea recently while still taking diuretic therapy  Recommended to hold lasix completely until Friday  He will get a repeat BMP that day  Further recommendations will be based on BMP results  All questions answered

## 2021-04-23 ENCOUNTER — IMMUNIZATIONS (OUTPATIENT)
Dept: FAMILY MEDICINE CLINIC | Facility: HOSPITAL | Age: 76
End: 2021-04-23
Payer: MEDICARE

## 2021-04-23 ENCOUNTER — TELEPHONE (OUTPATIENT)
Dept: CARDIOLOGY CLINIC | Facility: HOSPITAL | Age: 76
End: 2021-04-23

## 2021-04-23 ENCOUNTER — APPOINTMENT (OUTPATIENT)
Dept: LAB | Facility: HOSPITAL | Age: 76
End: 2021-04-23
Payer: MEDICARE

## 2021-04-23 DIAGNOSIS — Z23 ENCOUNTER FOR IMMUNIZATION: Primary | ICD-10-CM

## 2021-04-23 LAB
ANION GAP SERPL CALCULATED.3IONS-SCNC: 11 MMOL/L (ref 4–13)
BUN SERPL-MCNC: 24 MG/DL (ref 5–25)
CALCIUM SERPL-MCNC: 8.8 MG/DL (ref 8.3–10.1)
CHLORIDE SERPL-SCNC: 102 MMOL/L (ref 100–108)
CO2 SERPL-SCNC: 24 MMOL/L (ref 21–32)
CREAT SERPL-MCNC: 1.34 MG/DL (ref 0.6–1.3)
GFR SERPL CREATININE-BSD FRML MDRD: 52 ML/MIN/1.73SQ M
GLUCOSE P FAST SERPL-MCNC: 106 MG/DL (ref 65–99)
POTASSIUM SERPL-SCNC: 4.4 MMOL/L (ref 3.5–5.3)
SODIUM SERPL-SCNC: 137 MMOL/L (ref 136–145)

## 2021-04-23 PROCEDURE — 36415 COLL VENOUS BLD VENIPUNCTURE: CPT | Performed by: PHYSICIAN ASSISTANT

## 2021-04-23 PROCEDURE — 0012A SARS-COV-2 / COVID-19 MRNA VACCINE (MODERNA) 100 MCG: CPT

## 2021-04-23 PROCEDURE — 80048 BASIC METABOLIC PNL TOTAL CA: CPT | Performed by: PHYSICIAN ASSISTANT

## 2021-04-23 PROCEDURE — 91301 SARS-COV-2 / COVID-19 MRNA VACCINE (MODERNA) 100 MCG: CPT

## 2021-04-23 NOTE — TELEPHONE ENCOUNTER
Called patient regarding lab results  Kidney function somewhat improved  Since stopping lasix, he has not had any increased edema  Weight increased about 3/4 of a lb  Recommended to hold lasix for another day, then to restart on Sunday at 40 mg once daily (was on BID)  He can utilize an additional 40 mg prn for weight gain of 3 or more lbs in 1 night or worsening edema  All questions answered

## 2021-04-27 ENCOUNTER — DOCUMENTATION (OUTPATIENT)
Dept: CARDIOLOGY CLINIC | Facility: HOSPITAL | Age: 76
End: 2021-04-27

## 2021-04-27 NOTE — PROGRESS NOTES
Patient called office with list of blood pressures and heart rates  Patient reports no weight gain  Will continue lasix 40 mg daily  Will Utlize an additional 40 mg if worsening edema or weight gain of 3 or more lbs in one night  Will call office with any concerns

## 2021-04-29 DIAGNOSIS — I10 ESSENTIAL HYPERTENSION: ICD-10-CM

## 2021-04-29 DIAGNOSIS — I50.32 CHF (CONGESTIVE HEART FAILURE), NYHA CLASS II, CHRONIC, DIASTOLIC (HCC): ICD-10-CM

## 2021-04-29 RX ORDER — LOSARTAN POTASSIUM 100 MG/1
TABLET ORAL
Qty: 30 TABLET | Refills: 4 | Status: SHIPPED | OUTPATIENT
Start: 2021-04-29 | End: 2021-09-30

## 2021-04-29 RX ORDER — LISINOPRIL 20 MG/1
TABLET ORAL
Qty: 60 TABLET | Refills: 4 | Status: SHIPPED | OUTPATIENT
Start: 2021-04-29 | End: 2021-09-30

## 2021-04-29 RX ORDER — FUROSEMIDE 40 MG/1
TABLET ORAL
Qty: 60 TABLET | Refills: 4 | Status: SHIPPED | OUTPATIENT
Start: 2021-04-29 | End: 2021-09-01

## 2021-06-25 ENCOUNTER — APPOINTMENT (OUTPATIENT)
Dept: LAB | Facility: HOSPITAL | Age: 76
End: 2021-06-25
Attending: INTERNAL MEDICINE
Payer: MEDICARE

## 2021-06-25 DIAGNOSIS — I10 ESSENTIAL HYPERTENSION: ICD-10-CM

## 2021-06-25 DIAGNOSIS — E78.2 MIXED HYPERLIPIDEMIA: ICD-10-CM

## 2021-06-25 LAB
ALBUMIN SERPL BCP-MCNC: 3.8 G/DL (ref 3.5–5)
ALP SERPL-CCNC: 44 U/L (ref 46–116)
ALT SERPL W P-5'-P-CCNC: 45 U/L (ref 12–78)
ANION GAP SERPL CALCULATED.3IONS-SCNC: 9 MMOL/L (ref 4–13)
AST SERPL W P-5'-P-CCNC: 34 U/L (ref 5–45)
BASOPHILS # BLD AUTO: 0.04 THOUSANDS/ΜL (ref 0–0.1)
BASOPHILS NFR BLD AUTO: 0 % (ref 0–1)
BILIRUB SERPL-MCNC: 1.1 MG/DL (ref 0.2–1)
BUN SERPL-MCNC: 23 MG/DL (ref 5–25)
CALCIUM SERPL-MCNC: 9 MG/DL (ref 8.3–10.1)
CHLORIDE SERPL-SCNC: 99 MMOL/L (ref 100–108)
CO2 SERPL-SCNC: 28 MMOL/L (ref 21–32)
CREAT SERPL-MCNC: 1.04 MG/DL (ref 0.6–1.3)
EOSINOPHIL # BLD AUTO: 0.22 THOUSAND/ΜL (ref 0–0.61)
EOSINOPHIL NFR BLD AUTO: 2 % (ref 0–6)
ERYTHROCYTE [DISTWIDTH] IN BLOOD BY AUTOMATED COUNT: 12.6 % (ref 11.6–15.1)
GFR SERPL CREATININE-BSD FRML MDRD: 70 ML/MIN/1.73SQ M
GLUCOSE P FAST SERPL-MCNC: 102 MG/DL (ref 65–99)
HCT VFR BLD AUTO: 47.4 % (ref 36.5–49.3)
HGB BLD-MCNC: 15.2 G/DL (ref 12–17)
IMM GRANULOCYTES # BLD AUTO: 0.04 THOUSAND/UL (ref 0–0.2)
IMM GRANULOCYTES NFR BLD AUTO: 0 % (ref 0–2)
LDLC SERPL DIRECT ASSAY-MCNC: 54 MG/DL (ref 0–100)
LYMPHOCYTES # BLD AUTO: 3.85 THOUSANDS/ΜL (ref 0.6–4.47)
LYMPHOCYTES NFR BLD AUTO: 35 % (ref 14–44)
MCH RBC QN AUTO: 28.1 PG (ref 26.8–34.3)
MCHC RBC AUTO-ENTMCNC: 32.1 G/DL (ref 31.4–37.4)
MCV RBC AUTO: 88 FL (ref 82–98)
MONOCYTES # BLD AUTO: 1.06 THOUSAND/ΜL (ref 0.17–1.22)
MONOCYTES NFR BLD AUTO: 10 % (ref 4–12)
NEUTROPHILS # BLD AUTO: 5.72 THOUSANDS/ΜL (ref 1.85–7.62)
NEUTS SEG NFR BLD AUTO: 53 % (ref 43–75)
NRBC BLD AUTO-RTO: 0 /100 WBCS
PLATELET # BLD AUTO: 209 THOUSANDS/UL (ref 149–390)
PMV BLD AUTO: 9.4 FL (ref 8.9–12.7)
POTASSIUM SERPL-SCNC: 4.1 MMOL/L (ref 3.5–5.3)
PROT SERPL-MCNC: 7.9 G/DL (ref 6.4–8.2)
RBC # BLD AUTO: 5.41 MILLION/UL (ref 3.88–5.62)
SODIUM SERPL-SCNC: 136 MMOL/L (ref 136–145)
WBC # BLD AUTO: 10.93 THOUSAND/UL (ref 4.31–10.16)

## 2021-06-25 PROCEDURE — 85025 COMPLETE CBC W/AUTO DIFF WBC: CPT

## 2021-06-25 PROCEDURE — 80053 COMPREHEN METABOLIC PANEL: CPT

## 2021-06-25 PROCEDURE — 36415 COLL VENOUS BLD VENIPUNCTURE: CPT

## 2021-06-25 PROCEDURE — 83721 ASSAY OF BLOOD LIPOPROTEIN: CPT

## 2021-06-30 DIAGNOSIS — I10 BENIGN ESSENTIAL HYPERTENSION: ICD-10-CM

## 2021-06-30 DIAGNOSIS — I48.0 PAF (PAROXYSMAL ATRIAL FIBRILLATION) (HCC): ICD-10-CM

## 2021-06-30 RX ORDER — APIXABAN 5 MG/1
TABLET, FILM COATED ORAL
Qty: 60 TABLET | Refills: 4 | Status: SHIPPED | OUTPATIENT
Start: 2021-06-30 | End: 2021-12-01

## 2021-06-30 RX ORDER — SPIRONOLACTONE 25 MG/1
25 TABLET ORAL DAILY
Qty: 30 TABLET | Refills: 4 | Status: SHIPPED | OUTPATIENT
Start: 2021-06-30 | End: 2021-12-01

## 2021-07-01 ENCOUNTER — OFFICE VISIT (OUTPATIENT)
Dept: INTERNAL MEDICINE CLINIC | Facility: CLINIC | Age: 76
End: 2021-07-01
Payer: MEDICARE

## 2021-07-01 VITALS
BODY MASS INDEX: 35.99 KG/M2 | TEMPERATURE: 96.5 F | HEIGHT: 69 IN | WEIGHT: 243 LBS | DIASTOLIC BLOOD PRESSURE: 72 MMHG | SYSTOLIC BLOOD PRESSURE: 130 MMHG | OXYGEN SATURATION: 98 % | HEART RATE: 102 BPM

## 2021-07-01 DIAGNOSIS — Z00.00 MEDICARE ANNUAL WELLNESS VISIT, SUBSEQUENT: Primary | ICD-10-CM

## 2021-07-01 DIAGNOSIS — M05.79 RHEUMATOID ARTHRITIS INVOLVING MULTIPLE SITES WITH POSITIVE RHEUMATOID FACTOR (HCC): ICD-10-CM

## 2021-07-01 DIAGNOSIS — E66.01 CLASS 2 SEVERE OBESITY DUE TO EXCESS CALORIES WITH SERIOUS COMORBIDITY AND BODY MASS INDEX (BMI) OF 37.0 TO 37.9 IN ADULT (HCC): ICD-10-CM

## 2021-07-01 DIAGNOSIS — Z12.11 COLON CANCER SCREENING: ICD-10-CM

## 2021-07-01 DIAGNOSIS — I15.0 RENOVASCULAR HYPERTENSION: ICD-10-CM

## 2021-07-01 PROCEDURE — 1123F ACP DISCUSS/DSCN MKR DOCD: CPT | Performed by: INTERNAL MEDICINE

## 2021-07-01 PROCEDURE — G0438 PPPS, INITIAL VISIT: HCPCS | Performed by: INTERNAL MEDICINE

## 2021-07-01 NOTE — PROGRESS NOTES
Assessment and Plan:   Wellness,subsequent  Problem List Items Addressed This Visit        Cardiovascular and Mediastinum    Renovascular hypertension       Musculoskeletal and Integument    Rheumatoid arthritis ([facility] )       Other    Class 2 severe obesity due to excess calories with serious comorbidity and body mass index (BMI) of 37 0 to 37 9 in [facility])    Colon cancer screening - Primary    Relevant Orders    Ambulatory referral to Colorectal Surgery           Preventive health issues were discussed with patient, and age appropriate screening tests were ordered as noted in patient's After Visit Summary  Personalized health advice and appropriate referrals for health education or preventive services given if needed, as noted in patient's After Visit Summary       History of Present Illness:     Patient presents for Medicare Annual Wellness visit    Patient Care Team:  Saulo Guallpa DO as PCP - DO Saulo Gamez, Ryne Parker MD as Endoscopist     Problem List:     Patient Active Problem List   Diagnosis    Aortic stenosis, mild    Chronic diastolic CHF (congestive heart failure) ([place] [address] )    Nonocclusive coronary atherosclerosis of native coronary artery    Dyslipidemia    Essential hypertension    Obstructive sleep apnea of adult    Paroxysmal atrial fibrillation (HCC)    Paroxysmal atrial tachycardia (HCC)    Allergic rhinitis due to pollen    Erectile dysfunction    GERD without esophagitis    Class 2 severe obesity due to excess calories with serious comorbidity and body mass index (BMI) of 37 0 to 37 9 in [facility])    Renovascular hypertension    Rheumatoid arthritis ([facility] )    History of colon polyps    Colon cancer screening    Mixed hyperlipidemia      Past Medical and Surgical History:     Past Medical History:   Diagnosis Date    Atrial fibrillation ([place] [address] )     Hypertension     Nonocclusive coronary atherosclerosis of native coronary artery 3/26/2015 Past Surgical History:   Procedure Laterality Date    APPENDECTOMY      BACK SURGERY  1996    disc repair    CARDIAC CATHETERIZATION      50% lesion of mid LAD, 40% lesion of first obtuse marginal lesion  Last assessed 1/17/2018     CHOLECYSTECTOMY      COLON SURGERY      colon ressection    COLONOSCOPY N/A 1/14/2019    Procedure: COLONOSCOPY;  Surgeon: Saroj Gracia MD;  Location: [facility];  Service: Colorectal    CYSTOSCOPY      Diagnostic     HEMORRHOID SURGERY  08/2011    TONSILLECTOMY        Family History:     Family History   Problem Relation Age of Onset    Diabetes Mother     Stroke Mother       Social History:     Social History     Socioeconomic History    Marital status: /Civil Union     Spouse name: None    Number of children: None    Years of education: None    Highest education level: None   Occupational History    None   Tobacco Use    Smoking status: Former Smoker    Smokeless tobacco: Former User   Vaping Use    Vaping Use: Never used   Substance and Sexual Activity    Alcohol use: Yes     Alcohol/week: 4 0 standard drinks     Types: 4 Cans of beer per week     Comment: daily    Drug use: No    Sexual activity: None   Other Topics Concern    None   Social History Narrative    Dental care, regularly    Good dental hygiene    No advance directives    No caffeine use    Uses safety equipment - Seatbelts      Social Determinants of Health     Financial Resource Strain:     Difficulty of Paying Living Expenses:    Food Insecurity:     Worried About Running Out of Food in the Last Year:     Ran Out of Food in the Last Year:    Transportation Needs:     Lack of Transportation (Medical):      Lack of Transportation (Non-Medical):    Physical Activity:     Days of Exercise per Week:     Minutes of Exercise per Session:    Stress:     Feeling of Stress :    Social Connections:     Frequency of Communication with Friends and Family:     Frequency of Social Gatherings with Friends and Family:     Attends Yazidism Services:     Active Member of Clubs or Organizations:     Attends Club or Organization Meetings:     Marital Status:    Intimate Partner Violence:     Fear of Current or Ex-Partner:     Emotionally Abused:     Physically Abused:     Sexually Abused:       Medications and Allergies:     Current Outpatient Medications   Medication Sig Dispense Refill    Eliquis 5 MG TAKE ONE TABLET BY MOUTH TWICE A DAY  60 tablet 4    folic acid (FOLVITE) 1 mg tablet TAKE (1) TABLET BY MOUTH DAILY  30 tablet 4    furosemide (LASIX) 40 mg tablet TAKE ONE TABLET BY MOUTH TWICE A DAY  60 tablet 4    lisinopril (ZESTRIL) 20 mg tablet TAKE (1) TABLET BY MOUTH TWICE DAILY  60 tablet 4    losartan (COZAAR) 100 MG tablet TAKE (1) TABLET BY MOUTH DAILY  30 tablet 4    NIFEdipine (PROCARDIA XL) 90 mg 24 hr tablet TAKE 1 TABLET DAILY  30 tablet 4    omeprazole (PriLOSEC) 20 mg delayed release capsule TAKE (1) CAPSULE DAILY  30 capsule 4    predniSONE 5 mg tablet TAKE ONE TABLET DAILY  30 tablet 4    rosuvastatin (CRESTOR) 20 MG tablet TAKE ONE TABLET BY MOUTH DAILY  30 tablet 4    spironolactone (ALDACTONE) 25 mg tablet Take 1 tablet (25 mg total) by mouth daily 30 tablet 4    colesevelam (WELCHOL) 625 mg tablet Take 1 tablet (625 mg total) by mouth daily (Patient not taking: Reported on 7/1/2021) 90 tablet 3     No current facility-administered medications for this visit       No Known Allergies   Immunizations:     Immunization History   Administered Date(s) Administered    Influenza, seasonal, injectable 04/18/2016    Pneumococcal Polysaccharide PPV23 04/18/2016    SARS-CoV-2 / COVID-19 mRNA IM ([facility]) 03/25/2021, 04/23/2021      Health Maintenance:         Topic Date Due    Colorectal Cancer Screening  01/14/2021    Hepatitis C Screening  Completed         Topic Date Due    DTaP,Tdap,and Td Vaccines (1 - Tdap) Never done    Influenza Vaccine (1) 09/01/2021 Medicare Health Risk Assessment:     /72   Pulse 102   Temp (!) 96 5 °F (35 8 °C) (Temporal)   Ht 5' 9" (1 753 m)   Wt 110 kg (243 lb)   SpO2 98%   BMI 35 88 kg/m²      Pricilla Martin is here for his Subsequent Wellness visit  Last Medicare Wellness visit information reviewed, patient interviewed, no change since last AWV  Health Risk Assessment:   Patient rates overall health as fair  Patient feels that their physical health rating is same  Patient is satisfied with their life  Eyesight was rated as slightly worse  Hearing was rated as same  Patient feels that their emotional and mental health rating is same  Patients states they are never, rarely angry  Patient states they are sometimes unusually tired/fatigued  Pain experienced in the last 7 days has been none  Patient states that he has experienced no weight loss or gain in last 6 months  Depression Screening:   PHQ-2 Score: 0      Fall Risk Screening: In the past year, patient has experienced: no history of falling in past year      Home Safety:  Patient does not have trouble with stairs inside or outside of their home  Patient has working smoke alarms and has working carbon monoxide detector  Home safety hazards include: none  Nutrition:   Current diet is Regular  Medications:   Patient is currently taking over-the-counter supplements  OTC medications include: see medication list  Patient is able to manage medications  Activities of Daily Living (ADLs)/Instrumental Activities of Daily Living (IADLs):   Walk and transfer into and out of bed and chair?: Yes  Dress and groom yourself?: Yes    Bathe or shower yourself?: Yes    Feed yourself?  Yes  Do your laundry/housekeeping?: Yes  Manage your money, pay your bills and track your expenses?: Yes  Make your own meals?: Yes    Do your own shopping?: Yes    Previous Hospitalizations:   Any hospitalizations or ED visits within the last 12 months?: No      Advance Care Planning:   Living will: Yes    Durable POA for healthcare: Yes    Advanced directive: Yes    End of Life Decisions reviewed with patient: Yes      Cognitive Screening:   Provider or family/friend/caregiver concerned regarding cognition?: No    PREVENTIVE SCREENINGS      Cardiovascular Screening:    General: Screening Not Indicated and History Lipid Disorder      Diabetes Screening:     General: Screening Current      Colorectal Cancer Screening:     General: Screening Current      Prostate Cancer Screening:    General: Screening Current      Abdominal Aortic Aneurysm (AAA) Screening:    Risk factors include: age between 73-67 yo and tobacco use        Lung Cancer Screening:     General: Screening Not Indicated      Hepatitis C Screening:    General: Screening Current    Screening, Brief Intervention, and Referral to Treatment (SBIRT)    Screening  Typical number of drinks in a day: 5  Typical number of drinks in a week: 7  Interpretation: Low risk drinking behavior      Single Item Drug Screening:  How often have you used an illegal drug (including marijuana) or a prescription medication for non-medical reasons in the past year? never    Single Item Drug Screen Score: 0  Interpretation: Negative screen for possible drug use disorder      Tabitha Hodges,

## 2021-07-01 NOTE — PATIENT INSTRUCTIONS

## 2021-09-01 DIAGNOSIS — K21.9 GASTROESOPHAGEAL REFLUX DISEASE: ICD-10-CM

## 2021-09-01 DIAGNOSIS — I50.32 CHF (CONGESTIVE HEART FAILURE), NYHA CLASS II, CHRONIC, DIASTOLIC (HCC): ICD-10-CM

## 2021-09-01 DIAGNOSIS — M19.90 ARTHRITIS: ICD-10-CM

## 2021-09-01 DIAGNOSIS — I10 ESSENTIAL HYPERTENSION: ICD-10-CM

## 2021-09-01 DIAGNOSIS — E78.5 DYSLIPIDEMIA: ICD-10-CM

## 2021-09-01 RX ORDER — ROSUVASTATIN CALCIUM 20 MG/1
TABLET, COATED ORAL
Qty: 30 TABLET | Refills: 4 | Status: SHIPPED | OUTPATIENT
Start: 2021-09-01 | End: 2022-02-02

## 2021-09-01 RX ORDER — FUROSEMIDE 40 MG/1
TABLET ORAL
Qty: 60 TABLET | Refills: 4 | Status: SHIPPED | OUTPATIENT
Start: 2021-09-01 | End: 2022-02-02

## 2021-09-01 RX ORDER — NIFEDIPINE 90 MG/1
TABLET, EXTENDED RELEASE ORAL
Qty: 30 TABLET | Refills: 4 | Status: SHIPPED | OUTPATIENT
Start: 2021-09-01 | End: 2022-02-02

## 2021-09-02 ENCOUNTER — TELEPHONE (OUTPATIENT)
Dept: FAMILY MEDICINE CLINIC | Facility: CLINIC | Age: 76
End: 2021-09-02

## 2021-09-02 ENCOUNTER — HOSPITAL ENCOUNTER (OUTPATIENT)
Dept: RADIOLOGY | Facility: HOSPITAL | Age: 76
Discharge: HOME | End: 2021-09-02
Attending: INTERNAL MEDICINE
Payer: MEDICARE

## 2021-09-02 DIAGNOSIS — M25.512 ACUTE PAIN OF LEFT SHOULDER: Primary | ICD-10-CM

## 2021-09-02 DIAGNOSIS — M25.512 ACUTE PAIN OF LEFT SHOULDER: ICD-10-CM

## 2021-09-02 PROCEDURE — 73030 X-RAY EXAM OF SHOULDER: CPT

## 2021-09-02 NOTE — TELEPHONE ENCOUNTER
Malou Burns asking if you can order a Left shoulder xray for patient, c/o shoulder pain, denies injury

## 2021-09-08 DIAGNOSIS — M19.90 ARTHRITIS: Primary | ICD-10-CM

## 2021-09-08 DIAGNOSIS — K21.9 GASTROESOPHAGEAL REFLUX DISEASE: ICD-10-CM

## 2021-09-08 RX ORDER — FOLIC ACID 1 MG/1
TABLET ORAL
Qty: 30 TABLET | Refills: 4 | Status: SHIPPED | OUTPATIENT
Start: 2021-09-08 | End: 2022-05-13

## 2021-09-08 RX ORDER — OMEPRAZOLE 20 MG/1
20 CAPSULE, DELAYED RELEASE ORAL DAILY
Qty: 30 CAPSULE | Refills: 4 | Status: SHIPPED | OUTPATIENT
Start: 2021-09-08 | End: 2022-07-05

## 2021-09-08 RX ORDER — OMEPRAZOLE 20 MG/1
CAPSULE, DELAYED RELEASE ORAL
Qty: 30 CAPSULE | Refills: 4 | Status: SHIPPED | OUTPATIENT
Start: 2021-09-08 | End: 2022-05-13

## 2021-09-08 RX ORDER — FOLIC ACID 1 MG/1
1000 TABLET ORAL DAILY
Qty: 30 TABLET | Refills: 4 | Status: SHIPPED | OUTPATIENT
Start: 2021-09-08 | End: 2022-07-05

## 2021-09-08 RX ORDER — PREDNISONE 1 MG/1
TABLET ORAL
Qty: 30 TABLET | Refills: 4 | Status: SHIPPED | OUTPATIENT
Start: 2021-09-08 | End: 2022-05-13

## 2021-09-08 RX ORDER — PREDNISONE 1 MG/1
5 TABLET ORAL DAILY
Qty: 30 TABLET | Refills: 4 | Status: SHIPPED | OUTPATIENT
Start: 2021-09-08 | End: 2022-07-05

## 2021-09-30 DIAGNOSIS — I10 ESSENTIAL HYPERTENSION: ICD-10-CM

## 2021-09-30 RX ORDER — LOSARTAN POTASSIUM 100 MG/1
TABLET ORAL
Qty: 30 TABLET | Refills: 4 | Status: SHIPPED | OUTPATIENT
Start: 2021-09-30 | End: 2022-03-02

## 2021-09-30 RX ORDER — LISINOPRIL 20 MG/1
TABLET ORAL
Qty: 60 TABLET | Refills: 4 | Status: SHIPPED | OUTPATIENT
Start: 2021-09-30 | End: 2022-04-02

## 2021-10-28 ENCOUNTER — TELEPHONE (OUTPATIENT)
Dept: CARDIOLOGY CLINIC | Facility: HOSPITAL | Age: 76
End: 2021-10-28

## 2021-10-28 ENCOUNTER — RA CDI HCC (OUTPATIENT)
Dept: OTHER | Facility: HOSPITAL | Age: 76
End: 2021-10-28

## 2021-10-29 PROBLEM — I11.0 HYPERTENSIVE HEART DISEASE WITH CONGESTIVE HEART FAILURE (HCC): Status: ACTIVE | Noted: 2021-10-29

## 2021-11-04 ENCOUNTER — OFFICE VISIT (OUTPATIENT)
Dept: FAMILY MEDICINE CLINIC | Facility: CLINIC | Age: 76
End: 2021-11-04
Payer: MEDICARE

## 2021-11-04 VITALS
RESPIRATION RATE: 18 BRPM | BODY MASS INDEX: 36.17 KG/M2 | HEART RATE: 76 BPM | SYSTOLIC BLOOD PRESSURE: 128 MMHG | HEIGHT: 69 IN | TEMPERATURE: 97.5 F | WEIGHT: 244.2 LBS | DIASTOLIC BLOOD PRESSURE: 78 MMHG

## 2021-11-04 DIAGNOSIS — I50.32 HYPERTENSIVE HEART DISEASE WITH CHRONIC DIASTOLIC CONGESTIVE HEART FAILURE (HCC): Primary | ICD-10-CM

## 2021-11-04 DIAGNOSIS — I15.0 RENOVASCULAR HYPERTENSION: ICD-10-CM

## 2021-11-04 DIAGNOSIS — I11.0 HYPERTENSIVE HEART DISEASE WITH CHRONIC DIASTOLIC CONGESTIVE HEART FAILURE (HCC): Primary | ICD-10-CM

## 2021-11-04 DIAGNOSIS — I48.0 PAROXYSMAL ATRIAL FIBRILLATION (HCC): ICD-10-CM

## 2021-11-04 DIAGNOSIS — M05.79 RHEUMATOID ARTHRITIS INVOLVING MULTIPLE SITES WITH POSITIVE RHEUMATOID FACTOR (HCC): ICD-10-CM

## 2021-11-04 PROCEDURE — 99214 OFFICE O/P EST MOD 30 MIN: CPT | Performed by: INTERNAL MEDICINE

## 2021-11-09 ENCOUNTER — EVALUATION (OUTPATIENT)
Dept: PHYSICAL THERAPY | Facility: CLINIC | Age: 76
End: 2021-11-09
Payer: MEDICARE

## 2021-11-09 DIAGNOSIS — M75.42 IMPINGEMENT SYNDROME OF LEFT SHOULDER: Primary | ICD-10-CM

## 2021-11-09 DIAGNOSIS — M25.512 ACUTE PAIN OF LEFT SHOULDER: ICD-10-CM

## 2021-11-09 PROCEDURE — 97162 PT EVAL MOD COMPLEX 30 MIN: CPT | Performed by: PHYSICAL THERAPIST

## 2021-11-09 PROCEDURE — 97535 SELF CARE MNGMENT TRAINING: CPT | Performed by: PHYSICAL THERAPIST

## 2021-11-09 PROCEDURE — 97112 NEUROMUSCULAR REEDUCATION: CPT | Performed by: PHYSICAL THERAPIST

## 2021-11-12 ENCOUNTER — OFFICE VISIT (OUTPATIENT)
Dept: CARDIOLOGY CLINIC | Facility: HOSPITAL | Age: 76
End: 2021-11-12
Payer: MEDICARE

## 2021-11-12 VITALS
HEIGHT: 69 IN | SYSTOLIC BLOOD PRESSURE: 118 MMHG | DIASTOLIC BLOOD PRESSURE: 70 MMHG | WEIGHT: 242.2 LBS | HEART RATE: 134 BPM | BODY MASS INDEX: 35.87 KG/M2

## 2021-11-12 DIAGNOSIS — I35.0 MODERATE AORTIC STENOSIS: ICD-10-CM

## 2021-11-12 DIAGNOSIS — I47.1 PAROXYSMAL ATRIAL TACHYCARDIA (HCC): ICD-10-CM

## 2021-11-12 DIAGNOSIS — I48.19 PERSISTENT ATRIAL FIBRILLATION (HCC): Primary | ICD-10-CM

## 2021-11-12 DIAGNOSIS — E78.5 DYSLIPIDEMIA: ICD-10-CM

## 2021-11-12 DIAGNOSIS — I10 ESSENTIAL HYPERTENSION: ICD-10-CM

## 2021-11-12 DIAGNOSIS — I25.10 NONOCCLUSIVE CORONARY ATHEROSCLEROSIS OF NATIVE CORONARY ARTERY: ICD-10-CM

## 2021-11-12 PROCEDURE — 93000 ELECTROCARDIOGRAM COMPLETE: CPT | Performed by: PHYSICIAN ASSISTANT

## 2021-11-12 PROCEDURE — 99214 OFFICE O/P EST MOD 30 MIN: CPT | Performed by: PHYSICIAN ASSISTANT

## 2021-11-16 ENCOUNTER — OFFICE VISIT (OUTPATIENT)
Dept: PHYSICAL THERAPY | Facility: CLINIC | Age: 76
End: 2021-11-16
Payer: MEDICARE

## 2021-11-16 ENCOUNTER — HOSPITAL ENCOUNTER (OUTPATIENT)
Dept: NON INVASIVE DIAGNOSTICS | Facility: HOSPITAL | Age: 76
Discharge: HOME | End: 2021-11-16
Payer: MEDICARE

## 2021-11-16 DIAGNOSIS — M25.512 ACUTE PAIN OF LEFT SHOULDER: ICD-10-CM

## 2021-11-16 DIAGNOSIS — I48.19 PERSISTENT ATRIAL FIBRILLATION (HCC): ICD-10-CM

## 2021-11-16 DIAGNOSIS — M75.42 IMPINGEMENT SYNDROME OF LEFT SHOULDER: Primary | ICD-10-CM

## 2021-11-16 PROCEDURE — 93225 XTRNL ECG REC<48 HRS REC: CPT

## 2021-11-16 PROCEDURE — 93226 XTRNL ECG REC<48 HR SCAN A/R: CPT

## 2021-11-16 PROCEDURE — 97535 SELF CARE MNGMENT TRAINING: CPT | Performed by: PHYSICAL THERAPIST

## 2021-11-16 PROCEDURE — 97140 MANUAL THERAPY 1/> REGIONS: CPT | Performed by: PHYSICAL THERAPIST

## 2021-11-19 PROCEDURE — 93227 XTRNL ECG REC<48 HR R&I: CPT | Performed by: INTERNAL MEDICINE

## 2021-11-23 ENCOUNTER — OFFICE VISIT (OUTPATIENT)
Dept: PHYSICAL THERAPY | Facility: CLINIC | Age: 76
End: 2021-11-23
Payer: MEDICARE

## 2021-11-23 DIAGNOSIS — M25.512 ACUTE PAIN OF LEFT SHOULDER: ICD-10-CM

## 2021-11-23 DIAGNOSIS — M75.42 IMPINGEMENT SYNDROME OF LEFT SHOULDER: Primary | ICD-10-CM

## 2021-11-23 PROCEDURE — 97535 SELF CARE MNGMENT TRAINING: CPT | Performed by: PHYSICAL THERAPIST

## 2021-11-30 ENCOUNTER — APPOINTMENT (OUTPATIENT)
Dept: PHYSICAL THERAPY | Facility: CLINIC | Age: 76
End: 2021-11-30
Payer: MEDICARE

## 2021-12-01 DIAGNOSIS — I48.0 PAF (PAROXYSMAL ATRIAL FIBRILLATION) (HCC): ICD-10-CM

## 2021-12-01 DIAGNOSIS — I10 BENIGN ESSENTIAL HYPERTENSION: ICD-10-CM

## 2021-12-01 RX ORDER — SPIRONOLACTONE 25 MG/1
25 TABLET ORAL DAILY
Qty: 30 TABLET | Refills: 4 | Status: SHIPPED | OUTPATIENT
Start: 2021-12-01

## 2021-12-01 RX ORDER — APIXABAN 5 MG/1
TABLET, FILM COATED ORAL
Qty: 60 TABLET | Refills: 4 | Status: SHIPPED | OUTPATIENT
Start: 2021-12-01 | End: 2022-08-03

## 2022-01-26 ENCOUNTER — HOSPITAL ENCOUNTER (OUTPATIENT)
Dept: NON INVASIVE DIAGNOSTICS | Facility: HOSPITAL | Age: 77
Discharge: HOME | End: 2022-01-26
Payer: MEDICARE

## 2022-01-26 VITALS
WEIGHT: 242 LBS | BODY MASS INDEX: 35.84 KG/M2 | DIASTOLIC BLOOD PRESSURE: 70 MMHG | SYSTOLIC BLOOD PRESSURE: 118 MMHG | HEART RATE: 80 BPM | HEIGHT: 69 IN

## 2022-01-26 DIAGNOSIS — I35.0 MODERATE AORTIC STENOSIS: ICD-10-CM

## 2022-01-26 DIAGNOSIS — I48.19 PERSISTENT ATRIAL FIBRILLATION (HCC): ICD-10-CM

## 2022-01-26 LAB
AORTIC ROOT: 3 CM
AORTIC VALVE MEAN VELOCITY: 22.3 M/S
AV AREA BY CONTINUOUS VTI: 1.1 CM2
AV AREA PEAK VELOCITY: 1.1 CM2
AV LVOT MEAN GRADIENT: 2 MMHG
AV LVOT PEAK GRADIENT: 4 MMHG
AV MEAN GRADIENT: 22 MMHG
AV PEAK GRADIENT: 34 MMHG
AV VALVE AREA: 1.13 CM2
AV VELOCITY RATIO: 0.32
DOP CALC AO PEAK VEL: 2.91 M/S
DOP CALC AO VTI: 65.06 CM
DOP CALC LVOT AREA: 3.46 CM2
DOP CALC LVOT DIAMETER: 2.1 CM
DOP CALC LVOT PEAK VEL VTI: 21.23 CM
DOP CALC LVOT PEAK VEL: 0.94 M/S
DOP CALC LVOT STROKE INDEX: 33.9 ML/M2
DOP CALC LVOT STROKE VOLUME: 73.5 CM3
FRACTIONAL SHORTENING: 41 % (ref 28–44)
INTERVENTRICULAR SEPTUM IN DIASTOLE (PARASTERNAL SHORT AXIS VIEW): 1.2 CM (ref 0.57–1.06)
LEFT ATRIUM SIZE: 4.8 CM
LEFT INTERNAL DIMENSION IN SYSTOLE: 3 CM (ref 2.1–4)
LEFT VENTRICULAR INTERNAL DIMENSION IN DIASTOLE: 5.1 CM (ref 8.05–12)
LEFT VENTRICULAR POSTERIOR WALL IN END DIASTOLE: 1.4 CM (ref 0.55–1.05)
LEFT VENTRICULAR STROKE VOLUME: 89 ML
MV E'TISSUE VEL-SEP: 12 CM/S
PA SYSTOLIC PRESSURE: 37 MMHG
RA PRESSURE ESTIMATED: 10 MMHG
RV PSP: 41 MMHG
SL CV LV EF: 60
SL CV PED ECHO LEFT VENTRICLE DIASTOLIC VOLUME (MOD BIPLANE) 2D: 124 ML
SL CV PED ECHO LEFT VENTRICLE SYSTOLIC VOLUME (MOD BIPLANE) 2D: 35 ML
TR MAX PG: 31 MMHG
TRICUSPID VALVE PEAK REGURGITATION VELOCITY: 2.8 M/S
Z-SCORE OF INTERVENTRICULAR SEPTUM IN END DIASTOLE: 3.03
Z-SCORE OF LEFT VENTRICULAR DIMENSION IN END SYSTOLE: -6.56
Z-SCORE OF LEFT VENTRICULAR POSTERIOR WALL IN END DIASTOLE: 4.72

## 2022-01-26 PROCEDURE — 93306 TTE W/DOPPLER COMPLETE: CPT

## 2022-01-26 PROCEDURE — 93306 TTE W/DOPPLER COMPLETE: CPT | Performed by: INTERNAL MEDICINE

## 2022-02-02 DIAGNOSIS — I10 ESSENTIAL HYPERTENSION: ICD-10-CM

## 2022-02-02 DIAGNOSIS — Z87.39 HISTORY OF INFLAMMATORY ARTHRITIS: ICD-10-CM

## 2022-02-02 DIAGNOSIS — I48.19 PERSISTENT ATRIAL FIBRILLATION (HCC): ICD-10-CM

## 2022-02-02 DIAGNOSIS — E78.5 DYSLIPIDEMIA: ICD-10-CM

## 2022-02-02 DIAGNOSIS — K21.9 GASTROESOPHAGEAL REFLUX DISEASE WITHOUT ESOPHAGITIS: Primary | ICD-10-CM

## 2022-02-02 DIAGNOSIS — I50.32 CHF (CONGESTIVE HEART FAILURE), NYHA CLASS II, CHRONIC, DIASTOLIC (HCC): ICD-10-CM

## 2022-02-02 RX ORDER — OMEPRAZOLE 20 MG/1
CAPSULE, DELAYED RELEASE ORAL
Qty: 30 CAPSULE | Refills: 4 | Status: SHIPPED | OUTPATIENT
Start: 2022-02-02 | End: 2022-05-13

## 2022-02-02 RX ORDER — NIFEDIPINE 90 MG/1
TABLET, EXTENDED RELEASE ORAL
Qty: 30 TABLET | Refills: 4 | Status: SHIPPED | OUTPATIENT
Start: 2022-02-02 | End: 2022-07-06

## 2022-02-02 RX ORDER — ROSUVASTATIN CALCIUM 20 MG/1
TABLET, COATED ORAL
Qty: 30 TABLET | Refills: 4 | Status: SHIPPED | OUTPATIENT
Start: 2022-02-02 | End: 2022-07-06

## 2022-02-02 RX ORDER — FOLIC ACID 1 MG/1
TABLET ORAL
Qty: 30 TABLET | Refills: 4 | Status: SHIPPED | OUTPATIENT
Start: 2022-02-02 | End: 2022-05-13

## 2022-02-02 RX ORDER — METOPROLOL TARTRATE 50 MG/1
50 TABLET, FILM COATED ORAL EVERY 12 HOURS SCHEDULED
Qty: 60 TABLET | Refills: 4 | Status: SHIPPED | OUTPATIENT
Start: 2022-02-02 | End: 2022-07-06

## 2022-02-02 RX ORDER — PREDNISONE 1 MG/1
TABLET ORAL
Qty: 30 TABLET | Refills: 4 | Status: SHIPPED | OUTPATIENT
Start: 2022-02-02 | End: 2022-05-13

## 2022-02-02 RX ORDER — FUROSEMIDE 40 MG/1
TABLET ORAL
Qty: 60 TABLET | Refills: 4 | Status: SHIPPED | OUTPATIENT
Start: 2022-02-02 | End: 2022-07-06

## 2022-03-02 DIAGNOSIS — I10 ESSENTIAL HYPERTENSION: ICD-10-CM

## 2022-03-02 RX ORDER — LOSARTAN POTASSIUM 100 MG/1
TABLET ORAL
Qty: 30 TABLET | Refills: 4 | Status: SHIPPED | OUTPATIENT
Start: 2022-03-02 | End: 2022-08-03

## 2022-04-01 DIAGNOSIS — I10 ESSENTIAL HYPERTENSION: ICD-10-CM

## 2022-04-02 RX ORDER — LISINOPRIL 20 MG/1
TABLET ORAL
Qty: 60 TABLET | Refills: 4 | Status: SHIPPED | OUTPATIENT
Start: 2022-04-02 | End: 2022-08-03

## 2022-05-11 ENCOUNTER — OFFICE VISIT (OUTPATIENT)
Dept: FAMILY MEDICINE CLINIC | Facility: CLINIC | Age: 77
End: 2022-05-11
Payer: MEDICARE

## 2022-05-11 VITALS
HEART RATE: 70 BPM | SYSTOLIC BLOOD PRESSURE: 130 MMHG | RESPIRATION RATE: 18 BRPM | DIASTOLIC BLOOD PRESSURE: 70 MMHG | HEIGHT: 69 IN | WEIGHT: 249.6 LBS | BODY MASS INDEX: 36.97 KG/M2 | TEMPERATURE: 97.6 F

## 2022-05-11 DIAGNOSIS — I50.32 HYPERTENSIVE HEART DISEASE WITH CHRONIC DIASTOLIC CONGESTIVE HEART FAILURE (HCC): ICD-10-CM

## 2022-05-11 DIAGNOSIS — M05.79 RHEUMATOID ARTHRITIS INVOLVING MULTIPLE SITES WITH POSITIVE RHEUMATOID FACTOR (HCC): ICD-10-CM

## 2022-05-11 DIAGNOSIS — I11.0 HYPERTENSIVE HEART DISEASE WITH CHRONIC DIASTOLIC CONGESTIVE HEART FAILURE (HCC): ICD-10-CM

## 2022-05-11 DIAGNOSIS — E66.01 CLASS 2 SEVERE OBESITY DUE TO EXCESS CALORIES WITH SERIOUS COMORBIDITY AND BODY MASS INDEX (BMI) OF 37.0 TO 37.9 IN ADULT (HCC): ICD-10-CM

## 2022-05-11 DIAGNOSIS — I48.19 PERSISTENT ATRIAL FIBRILLATION (HCC): ICD-10-CM

## 2022-05-11 DIAGNOSIS — K63.5 POLYP OF COLON, UNSPECIFIED PART OF COLON, UNSPECIFIED TYPE: Primary | ICD-10-CM

## 2022-05-11 DIAGNOSIS — R73.9 HYPERGLYCEMIA, UNSPECIFIED: ICD-10-CM

## 2022-05-11 PROCEDURE — 99214 OFFICE O/P EST MOD 30 MIN: CPT | Performed by: INTERNAL MEDICINE

## 2022-05-11 NOTE — PROGRESS NOTES
Assessment/Plan:         Diagnoses and all orders for this visit:    Polyp of colon, unspecified part of colon, unspecified type  -     Ambulatory Referral to Colorectal Surgery; Future  Due for repeat colon Dr Laureano Simpson did in 2019 Referral placed    Rheumatoid arthritis involving multiple sites with positive rheumatoid factor ([facility] )  Pt stable he did have injection shoulder recently by ortho which is helping thus far    Hypertensive heart disease with chronic diastolic congestive heart failure ([facility] [address] )  -     HEMOGLOBIN A1C W/ EAG ESTIMATION; Future  -     Lipid panel; Future  -     Comprehensive metabolic panel; Future  Pt wiol go for labs in Am and has cardio appt friday    Persistent atrial fibrillation (HCC)  Stable on current meds He will get labs in Am prior to cardio appt     Class 2 severe obesity due to excess calories with serious comorbidity and body mass index (BMI) of 37 0 to 37 9 in adult (HCC)  -     HEMOGLOBIN A1C W/ EAG ESTIMATION; Future  -     Lipid panel; Future  -     Comprehensive metabolic panel; Future  Lo carb diet Weight loss Increase water intake    Hyperglycemia, unspecified   -     HEMOGLOBIN A1C W/ EAG ESTIMATION; Future        Patient ID: Jed Craft is a 78 y o  male  HPI  Pt doing ok He saw different ortho in [id number] this week and had injection left sholder which has been helping thus far he does not want surgery No chest pain or sob he is aware due for repeat colon No bowels changes or melena he would go to Dr Laureano Simpson again at [facility] No acute issues He is staying active as recommended by ortho Diet fair control No falls       Review of Systems   Constitutional: Negative for activity change, chills and fever  HENT: Negative  Eyes: Negative for visual disturbance  Respiratory: Negative for cough and shortness of breath  Cardiovascular: Negative for chest pain, palpitations and leg swelling  Gastrointestinal: Negative  Negative for blood in stool     Genitourinary: Negative  Musculoskeletal: Positive for arthralgias and joint swelling  Neurological: Negative for dizziness, light-headedness and headaches  Psychiatric/Behavioral: Negative  Past Medical History:   Diagnosis Date    Atrial fibrillation ([facility] [address] )     Hypertension     Nonocclusive coronary atherosclerosis of native coronary artery 3/26/2015     Past Surgical History:   Procedure Laterality Date    APPENDECTOMY      BACK SURGERY  1996    disc repair    CARDIAC CATHETERIZATION      50% lesion of mid LAD, 40% lesion of first obtuse marginal lesion  Last assessed 1/17/2018     CHOLECYSTECTOMY      COLON SURGERY      colon ressection    COLONOSCOPY N/A 1/14/2019    Procedure: COLONOSCOPY;  Surgeon: Angie Peterson MD;  Location: [facility];  Service: Colorectal    CYSTOSCOPY      Diagnostic     HEMORRHOID SURGERY  08/2011    TONSILLECTOMY       Social History     Socioeconomic History    Marital status: /Civil Union     Spouse name: Not on file    Number of children: Not on file    Years of education: Not on file    Highest education level: Not on file   Occupational History    Not on file   Tobacco Use    Smoking status: Former Smoker    Smokeless tobacco: Former User   Vaping Use    Vaping Use: Never used   Substance and Sexual Activity    Alcohol use:  Yes     Alcohol/week: 4 0 standard drinks     Types: 4 Cans of beer per week     Comment: daily    Drug use: No    Sexual activity: Not on file   Other Topics Concern    Not on file   Social History Narrative    Dental care, regularly    Good dental hygiene    No advance directives    No caffeine use    Uses safety equipment - Seatbelts      Social Determinants of Health     Financial Resource Strain: Not on file   Food Insecurity: Not on file   Transportation Needs: Not on file   Physical Activity: Not on file   Stress: Not on file   Social Connections: Not on file   Intimate Partner Violence: Not on file   Housing Stability: Not on file     No Known Allergies  BMI Counseling: Body mass index is 36 86 kg/m²  The BMI is above normal  Nutrition recommendations include consuming healthier snacks, moderation in carbohydrate intake and increasing intake of lean protein  Exercise recommendations include exercising 3-5 times per week  Rationale for BMI follow-up plan is due to patient being overweight or obese  /70   Pulse 70   Temp 97 6 °F (36 4 °C) (Temporal)   Resp 18   Ht 5' 9" (1 753 m)   Wt 113 kg (249 lb 9 6 oz)   BMI 36 86 kg/m²          Physical Exam  Vitals reviewed  Constitutional:       General: He is not in acute distress  Appearance: Normal appearance  He is obese  He is not ill-appearing, toxic-appearing or diaphoretic  HENT:      Head: Normocephalic and atraumatic  Right Ear: Tympanic membrane and external ear normal       Left Ear: Tympanic membrane and external ear normal       Nose: Nose normal       Mouth/Throat:      Mouth: Mucous membranes are dry  Eyes:      General: No scleral icterus  Extraocular Movements: Extraocular movements intact  Conjunctiva/sclera: Conjunctivae normal       Pupils: Pupils are equal, round, and reactive to light  Neck:      Vascular: No carotid bruit  Cardiovascular:      Rate and Rhythm: Normal rate  Rhythm irregular  Pulses: Normal pulses  Heart sounds: Normal heart sounds  Pulmonary:      Effort: Pulmonary effort is normal  No respiratory distress  Breath sounds: Normal breath sounds  No wheezing  Abdominal:      General: Bowel sounds are normal  There is no distension  Palpations: Abdomen is soft  Tenderness: There is no abdominal tenderness  Musculoskeletal:      Cervical back: Normal range of motion and neck supple  Right lower leg: No edema  Left lower leg: No edema  Lymphadenopathy:      Cervical: No cervical adenopathy  Skin:     General: Skin is dry        Coloration: Skin is not jaundiced or pale    Neurological:      General: No focal deficit present  Mental Status: He is alert and oriented to person, place, and time  Mental status is at baseline  Cranial Nerves: No cranial nerve deficit  Psychiatric:         Mood and Affect: Mood normal          Behavior: Behavior normal          Thought Content:  Thought content normal          Judgment: Judgment normal

## 2022-05-12 ENCOUNTER — LAB (OUTPATIENT)
Dept: LAB | Facility: HOSPITAL | Age: 77
End: 2022-05-12
Attending: INTERNAL MEDICINE
Payer: MEDICARE

## 2022-05-12 DIAGNOSIS — R73.9 HYPERGLYCEMIA, UNSPECIFIED: ICD-10-CM

## 2022-05-12 DIAGNOSIS — E66.01 CLASS 2 SEVERE OBESITY DUE TO EXCESS CALORIES WITH SERIOUS COMORBIDITY AND BODY MASS INDEX (BMI) OF 37.0 TO 37.9 IN ADULT (HCC): ICD-10-CM

## 2022-05-12 DIAGNOSIS — I50.32 HYPERTENSIVE HEART DISEASE WITH CHRONIC DIASTOLIC CONGESTIVE HEART FAILURE (HCC): ICD-10-CM

## 2022-05-12 DIAGNOSIS — I11.0 HYPERTENSIVE HEART DISEASE WITH CHRONIC DIASTOLIC CONGESTIVE HEART FAILURE (HCC): ICD-10-CM

## 2022-05-12 LAB
ALBUMIN SERPL BCP-MCNC: 3.9 G/DL (ref 3.5–5)
ALP SERPL-CCNC: 55 U/L (ref 46–116)
ALT SERPL W P-5'-P-CCNC: 37 U/L (ref 12–78)
ANION GAP SERPL CALCULATED.3IONS-SCNC: 11 MMOL/L (ref 4–13)
AST SERPL W P-5'-P-CCNC: 30 U/L (ref 5–45)
BILIRUB SERPL-MCNC: 1.65 MG/DL (ref 0.2–1)
BUN SERPL-MCNC: 21 MG/DL (ref 5–25)
CALCIUM SERPL-MCNC: 9.1 MG/DL (ref 8.3–10.1)
CHLORIDE SERPL-SCNC: 100 MMOL/L (ref 100–108)
CHOLEST SERPL-MCNC: 149 MG/DL
CO2 SERPL-SCNC: 27 MMOL/L (ref 21–32)
CREAT SERPL-MCNC: 1.15 MG/DL (ref 0.6–1.3)
EST. AVERAGE GLUCOSE BLD GHB EST-MCNC: 131 MG/DL
GFR SERPL CREATININE-BSD FRML MDRD: 62 ML/MIN/1.73SQ M
GLUCOSE P FAST SERPL-MCNC: 137 MG/DL (ref 65–99)
HBA1C MFR BLD: 6.2 %
HDLC SERPL-MCNC: 98 MG/DL
LDLC SERPL CALC-MCNC: 39 MG/DL (ref 0–100)
NONHDLC SERPL-MCNC: 51 MG/DL
POTASSIUM SERPL-SCNC: 3.5 MMOL/L (ref 3.5–5.3)
PROT SERPL-MCNC: 7.9 G/DL (ref 6.4–8.2)
SODIUM SERPL-SCNC: 138 MMOL/L (ref 136–145)
TRIGL SERPL-MCNC: 59 MG/DL

## 2022-05-12 PROCEDURE — 80053 COMPREHEN METABOLIC PANEL: CPT

## 2022-05-12 PROCEDURE — 80061 LIPID PANEL: CPT

## 2022-05-12 PROCEDURE — 36415 COLL VENOUS BLD VENIPUNCTURE: CPT

## 2022-05-12 PROCEDURE — 83036 HEMOGLOBIN GLYCOSYLATED A1C: CPT

## 2022-05-13 ENCOUNTER — OFFICE VISIT (OUTPATIENT)
Dept: CARDIOLOGY CLINIC | Facility: HOSPITAL | Age: 77
End: 2022-05-13
Payer: MEDICARE

## 2022-05-13 VITALS
WEIGHT: 247 LBS | BODY MASS INDEX: 36.58 KG/M2 | DIASTOLIC BLOOD PRESSURE: 70 MMHG | HEIGHT: 69 IN | SYSTOLIC BLOOD PRESSURE: 122 MMHG | HEART RATE: 87 BPM

## 2022-05-13 DIAGNOSIS — I50.32 CHRONIC DIASTOLIC CHF (CONGESTIVE HEART FAILURE) (HCC): Primary | ICD-10-CM

## 2022-05-13 DIAGNOSIS — I47.1 PAROXYSMAL ATRIAL TACHYCARDIA (HCC): ICD-10-CM

## 2022-05-13 DIAGNOSIS — I48.0 PAROXYSMAL ATRIAL FIBRILLATION (HCC): ICD-10-CM

## 2022-05-13 DIAGNOSIS — E66.01 CLASS 2 SEVERE OBESITY DUE TO EXCESS CALORIES WITH SERIOUS COMORBIDITY AND BODY MASS INDEX (BMI) OF 37.0 TO 37.9 IN ADULT (HCC): ICD-10-CM

## 2022-05-13 DIAGNOSIS — G47.33 OBSTRUCTIVE SLEEP APNEA OF ADULT: ICD-10-CM

## 2022-05-13 DIAGNOSIS — I35.0 AORTIC STENOSIS, MILD: ICD-10-CM

## 2022-05-13 DIAGNOSIS — I50.32 HYPERTENSIVE HEART DISEASE WITH CHRONIC DIASTOLIC CONGESTIVE HEART FAILURE (HCC): ICD-10-CM

## 2022-05-13 DIAGNOSIS — I25.10 NONOCCLUSIVE CORONARY ATHEROSCLEROSIS OF NATIVE CORONARY ARTERY: ICD-10-CM

## 2022-05-13 DIAGNOSIS — I11.0 HYPERTENSIVE HEART DISEASE WITH CHRONIC DIASTOLIC CONGESTIVE HEART FAILURE (HCC): ICD-10-CM

## 2022-05-13 PROCEDURE — 99214 OFFICE O/P EST MOD 30 MIN: CPT | Performed by: INTERNAL MEDICINE

## 2022-05-13 NOTE — PROGRESS NOTES
Cardiology Follow Up    [address] 1936 [id number]  [id number]  [address] [facility] [address] [address]    1  Chronic diastolic CHF (congestive heart failure) ([address] )     2  Hypertensive heart disease with chronic diastolic congestive heart failure (HCC)     3  Paroxysmal atrial fibrillation (HCC)     4  Paroxysmal atrial tachycardia ([address] )     5  Aortic stenosis, mild     6  Nonocclusive coronary atherosclerosis of native coronary artery     7  Class 2 severe obesity due to excess calories with serious comorbidity and body mass index (BMI) of 37 0 to 37 9 in adult ([address] )     8  Obstructive sleep apnea of adult         Discussion/Summary:  Mr Pratik Montemayor is a pleasant 74-year-old male who presents to the office today for routine follow-up  Since his last visit he feels well  He appears euvolemic on exam today  No changes were made to his diuretic regimen  He should continue to weigh himself on a regular basis and call the office with any signs or symptoms of volume overload  A low-salt diet was reinforced  His blood pressure is well controlled in the office today  No changes were made to his antihypertensive medication regimen  His most recent lipids from earlier this year were reviewed  They are acceptable on his current statin regimen on which he will remain  He has been persistently in atrial fibrillation over the last year or so with which he has been asymptomatic  His rate control appears adequate on his current AV edward blocking regimen to which no changes were advised  He is on systemic anticoagulation with Eliquis  A rate control strategy is being pursued given he is asymptomatic and he also has untreated severe obstructive sleep apnea as a contributor        He underwent an echocardiogram earlier this year revealing moderate aortic stenosis which will require ongoing surveillance  He will return to the office in six months for ongoing evaluation  Interval History:   Mr Grady Acharya is a pleasant 79-year-old male who presents to the office today for routine follow-up  Since his last visit he has been feeling well  He has not been participating in any formal physical activity at home  He has had issues with his left shoulder necessitating injections  With the activity he is able to perform he is asymptomatic  He denies any exertional chest pain or shortness of breath  He denies any symptoms from his atrial fibrillation  He is maintained on systemic anticoagulation with Eliquis without bleeding consequences or falls  He denies any signs or symptoms of congestive heart failure including progressive lower extremity edema, paroxysmal nocturnal dyspnea, orthopnea, acute weight gain or increasing abdominal girth  He denies lightheadedness, syncope or presyncope  He denies symptoms of claudication  He remains on compliant with CPAP  Problem List     Aortic stenosis, mild    Chronic diastolic CHF (congestive heart failure) (HCC)    Coronary artery disease    Dyslipidemia    Hypertension    Obstructive sleep apnea of adult    Paroxysmal atrial fibrillation (HCC)    Paroxysmal atrial tachycardia (HCC)        Past Medical History:   Diagnosis Date    [facility]     Hypertension     Nonocclusive coronary atherosclerosis of native coronary artery 3/26/2015     Social History     Socioeconomic History    Marital status: /Civil Union     Spouse name: Not on file    Number of children: Not on file    Years of education: Not on file    Highest education level: Not on file   Occupational History    Not on file   Tobacco Use    Smoking status: Former Smoker    Smokeless tobacco: Former User   Vaping Use    Vaping Use: Never used   Substance and Sexual Activity    Alcohol use:  Yes     Alcohol/week: 4 0 standard drinks     Types: 4 Cans of beer per week     Comment: daily    Drug use: No    Sexual activity: Not on file   Other Topics Concern    Not on file   Social History Narrative    Dental care, regularly    Good dental hygiene    No advance directives    No caffeine use    Uses safety equipment - Seatbelts      Social Determinants of Health     Financial Resource Strain: Not on file   Food Insecurity: Not on file   Transportation Needs: Not on file   Physical Activity: Not on file   Stress: Not on file   Social Connections: Not on file   Intimate Partner Violence: Not on file   Housing Stability: Not on file      Family History   Problem Relation Age of Onset    Diabetes Mother     Stroke Mother      Past Surgical History:   Procedure Laterality Date    APPENDECTOMY      BACK SURGERY  1996    disc repair    CARDIAC CATHETERIZATION      50% lesion of mid LAD, 40% lesion of first obtuse marginal lesion  Last assessed 1/17/2018     CHOLECYSTECTOMY      COLON SURGERY      colon ressection    COLONOSCOPY N/A 1/14/2019    Procedure: COLONOSCOPY;  Surgeon: Melissa Griggs MD;  Location: [facility];  Service: Colorectal    CYSTOSCOPY      Diagnostic     HEMORRHOID SURGERY  08/2011    TONSILLECTOMY         Current Outpatient Medications:     colesevelam (WELCHOL) 625 mg tablet, Take 1 tablet (625 mg total) by mouth daily, Disp: 90 tablet, Rfl: 3    Eliquis 5 MG, TAKE ONE TABLET BY MOUTH TWICE A DAY , Disp: 60 tablet, Rfl: 4    folic acid (FOLVITE) 1 mg tablet, Take 1 tablet (1,000 mcg total) by mouth daily, Disp: 30 tablet, Rfl: 4    furosemide (LASIX) 40 mg tablet, TAKE ONE TABLET BY MOUTH TWICE A DAY , Disp: 60 tablet, Rfl: 4    lisinopril (ZESTRIL) 20 mg tablet, TAKE (1) TABLET BY MOUTH TWICE DAILY  , Disp: 60 tablet, Rfl: 4    losartan (COZAAR) 100 MG tablet, TAKE (1) TABLET BY MOUTH DAILY  , Disp: 30 tablet, Rfl: 4    metoprolol tartrate (LOPRESSOR) 50 mg tablet, Take 1 tablet (50 mg total) by mouth every 12 (twelve) hours, Disp: 60 tablet, Rfl: 4    NIFEdipine (PROCARDIA XL) 90 mg 24 hr tablet, TAKE 1 TABLET DAILY  , Disp: 30 tablet, Rfl: 4    omeprazole (PriLOSEC) 20 mg delayed release capsule, Take 1 capsule (20 mg total) by mouth daily, Disp: 30 capsule, Rfl: 4    predniSONE 5 mg tablet, Take 1 tablet (5 mg total) by mouth daily, Disp: 30 tablet, Rfl: 4    rosuvastatin (CRESTOR) 20 MG tablet, TAKE ONE TABLET BY MOUTH DAILY  , Disp: 30 tablet, Rfl: 4    spironolactone (ALDACTONE) 25 mg tablet, Take 1 tablet (25 mg total) by mouth daily, Disp: 30 tablet, Rfl: 4  No Known Allergies    Labs:     Chemistry        Component Value Date/Time     12/31/2015 0705    K 3 5 05/12/2022 0810    K 4 3 12/31/2015 0705     05/12/2022 0810     12/31/2015 0705    CO2 27 05/12/2022 0810    CO2 27 9 12/31/2015 0705    BUN 21 05/12/2022 0810    BUN 14 12/31/2015 0705    CREATININE 1 15 05/12/2022 0810    CREATININE 0 83 12/31/2015 0705        Component Value Date/Time    CALCIUM 9 1 05/12/2022 0810    CALCIUM 8 6 12/31/2015 0705    ALKPHOS 55 05/12/2022 0810    ALKPHOS 51 12/31/2015 0705    AST 30 05/12/2022 0810    AST 29 12/31/2015 0705    ALT 37 05/12/2022 0810    ALT 48 12/31/2015 0705    BILITOT 1 07 (H) 12/31/2015 0705            Lab Results   Component Value Date    CHOL 213 12/31/2015    CHOL 226 03/12/2015     Lab Results   Component Value Date    HDL 98 05/12/2022    HDL 82 07/01/2020    HDL 79 (H) 08/13/2019     Lab Results   Component Value Date    LDLCALC 39 05/12/2022    LDLCALC 54 07/01/2020    LDLCALC 42 08/13/2019     Lab Results   Component Value Date    TRIG 59 05/12/2022    TRIG 102 07/01/2020    TRIG 54 08/13/2019     No results found for: CHOLHDL    Imaging: No results found  Review of Systems   Cardiovascular: Positive for leg swelling  Negative for chest pain, claudication, cyanosis, dyspnea on exertion, near-syncope, orthopnea and palpitations  All other systems reviewed and are negative        Vitals: 05/13/22 1342   BP: 122/70   Pulse: 87     Vitals:    05/13/22 1342   Weight: 112 kg (247 lb)     Height: 5' 9" (175 3 cm)   Body mass index is 36 48 kg/m²      Physical Exam:  General:  Alert and cooperative, appears stated age  [de-identified]:  PERRLA, EOMI, no scleral icterus, no conjunctival pallor  Neck:  No lymphadenopathy, no thyromegaly, no carotid bruits, no elevated JVP  Heart:  Irregularly irregular, variable S1/S2, 2/6 mid-peaking CARISSA RUSB no radiation  Lungs:  Clear to auscultation bilaterally   Abdomen:  Soft, non-tender, positive bowel sounds, no rebound or guarding,   no organomegaly   Extremities:  + edema   Vascular:  2+ pedal pulses  Skin:  No rashes or lesions on exposed skin  Neurologic:  Cranial nerves II-XII grossly intact without focal deficits

## 2022-07-05 DIAGNOSIS — M19.90 ARTHRITIS: ICD-10-CM

## 2022-07-05 DIAGNOSIS — I50.32 CHF (CONGESTIVE HEART FAILURE), NYHA CLASS II, CHRONIC, DIASTOLIC (HCC): ICD-10-CM

## 2022-07-05 DIAGNOSIS — E78.5 DYSLIPIDEMIA: ICD-10-CM

## 2022-07-05 DIAGNOSIS — I48.19 PERSISTENT ATRIAL FIBRILLATION (HCC): ICD-10-CM

## 2022-07-05 DIAGNOSIS — K21.9 GASTROESOPHAGEAL REFLUX DISEASE: ICD-10-CM

## 2022-07-05 DIAGNOSIS — I10 ESSENTIAL HYPERTENSION: ICD-10-CM

## 2022-07-05 RX ORDER — PREDNISONE 1 MG/1
TABLET ORAL
Qty: 30 TABLET | Refills: 4 | Status: SHIPPED | OUTPATIENT
Start: 2022-07-05

## 2022-07-05 RX ORDER — FOLIC ACID 1 MG/1
TABLET ORAL
Qty: 30 TABLET | Refills: 4 | Status: SHIPPED | OUTPATIENT
Start: 2022-07-05

## 2022-07-05 RX ORDER — OMEPRAZOLE 20 MG/1
CAPSULE, DELAYED RELEASE ORAL
Qty: 30 CAPSULE | Refills: 4 | Status: SHIPPED | OUTPATIENT
Start: 2022-07-05

## 2022-07-06 RX ORDER — FUROSEMIDE 40 MG/1
TABLET ORAL
Qty: 60 TABLET | Refills: 4 | Status: SHIPPED | OUTPATIENT
Start: 2022-07-06

## 2022-07-06 RX ORDER — NIFEDIPINE 90 MG/1
TABLET, EXTENDED RELEASE ORAL
Qty: 30 TABLET | Refills: 4 | Status: SHIPPED | OUTPATIENT
Start: 2022-07-06

## 2022-07-06 RX ORDER — ROSUVASTATIN CALCIUM 20 MG/1
TABLET, COATED ORAL
Qty: 30 TABLET | Refills: 4 | Status: SHIPPED | OUTPATIENT
Start: 2022-07-06

## 2022-07-06 RX ORDER — METOPROLOL TARTRATE 50 MG/1
50 TABLET, FILM COATED ORAL EVERY 12 HOURS SCHEDULED
Qty: 60 TABLET | Refills: 4 | Status: SHIPPED | OUTPATIENT
Start: 2022-07-06

## 2022-08-03 DIAGNOSIS — I10 ESSENTIAL HYPERTENSION: ICD-10-CM

## 2022-08-03 DIAGNOSIS — I48.0 PAF (PAROXYSMAL ATRIAL FIBRILLATION) (HCC): ICD-10-CM

## 2022-08-03 RX ORDER — APIXABAN 5 MG/1
TABLET, FILM COATED ORAL
Qty: 60 TABLET | Refills: 6 | Status: SHIPPED | OUTPATIENT
Start: 2022-08-03

## 2022-08-03 RX ORDER — LOSARTAN POTASSIUM 100 MG/1
TABLET ORAL
Qty: 30 TABLET | Refills: 4 | Status: SHIPPED | OUTPATIENT
Start: 2022-08-03

## 2022-08-03 RX ORDER — LISINOPRIL 20 MG/1
TABLET ORAL
Qty: 60 TABLET | Refills: 4 | Status: SHIPPED | OUTPATIENT
Start: 2022-08-03

## 2022-08-03 NOTE — TELEPHONE ENCOUNTER
Requested medication(s) are due for refill today: yes  Patient has already received a courtesy refill: no  Other reason request has been forwarded to provider: labs [[email or web address]] : 53 yo woman current cigar smoker here for evaluation. Currently asymptomatic from respiratory standpoint but reports annual episodes of bronchitis.\par -- Smoking cessation strategies discussed with patient - not interested in medications at this time, was able to quit without any help last time. NRT provided. Information and encouragement provided. Tobacco control center referral provided\par -- will obtain PFTs to assess baseline lung function\par -- CXR\par -- Patient has multiple signs and symptoms of sleep-disordered breathing. She was referred to the [facility] for a diagnostic HST. The ramifications of JESSICA and its potential therapeutic modalities were discussed with the patient. The patient was cautioned on the importance of avoiding drowsy driving.\par \par All questions answered. Patient in agreement with plan. \par Follow up in 4w or sooner if needed.\par

## 2022-08-08 ENCOUNTER — ANESTHESIA EVENT (OUTPATIENT)
Dept: PERIOP | Facility: HOSPITAL | Age: 77
End: 2022-08-08

## 2022-08-08 ENCOUNTER — ANESTHESIA (OUTPATIENT)
Dept: PERIOP | Facility: HOSPITAL | Age: 77
End: 2022-08-08

## 2022-08-08 ENCOUNTER — HOSPITAL ENCOUNTER (OUTPATIENT)
Dept: PERIOP | Facility: HOSPITAL | Age: 77
Setting detail: OUTPATIENT SURGERY
Discharge: HOME | End: 2022-08-08
Attending: COLON & RECTAL SURGERY | Admitting: COLON & RECTAL SURGERY
Payer: MEDICARE

## 2022-08-08 VITALS
SYSTOLIC BLOOD PRESSURE: 131 MMHG | HEART RATE: 88 BPM | TEMPERATURE: 98.8 F | RESPIRATION RATE: 18 BRPM | DIASTOLIC BLOOD PRESSURE: 73 MMHG | OXYGEN SATURATION: 95 %

## 2022-08-08 DIAGNOSIS — Z86.010 PERSONAL HISTORY OF COLONIC POLYPS: ICD-10-CM

## 2022-08-08 PROCEDURE — 88305 TISSUE EXAM BY PATHOLOGIST: CPT | Performed by: PATHOLOGY

## 2022-08-08 PROCEDURE — 45385 COLONOSCOPY W/LESION REMOVAL: CPT | Performed by: COLON & RECTAL SURGERY

## 2022-08-08 RX ORDER — PROPOFOL 10 MG/ML
INJECTION, EMULSION INTRAVENOUS CONTINUOUS PRN
Status: DISCONTINUED | OUTPATIENT
Start: 2022-08-08 | End: 2022-08-08

## 2022-08-08 RX ORDER — SODIUM CHLORIDE, SODIUM LACTATE, POTASSIUM CHLORIDE, CALCIUM CHLORIDE 600; 310; 30; 20 MG/100ML; MG/100ML; MG/100ML; MG/100ML
INJECTION, SOLUTION INTRAVENOUS CONTINUOUS PRN
Status: DISCONTINUED | OUTPATIENT
Start: 2022-08-08 | End: 2022-08-08

## 2022-08-08 RX ORDER — PROPOFOL 10 MG/ML
INJECTION, EMULSION INTRAVENOUS AS NEEDED
Status: DISCONTINUED | OUTPATIENT
Start: 2022-08-08 | End: 2022-08-08

## 2022-08-08 RX ADMIN — SODIUM CHLORIDE, SODIUM LACTATE, POTASSIUM CHLORIDE, AND CALCIUM CHLORIDE: .6; .31; .03; .02 INJECTION, SOLUTION INTRAVENOUS at 12:54

## 2022-08-08 RX ADMIN — PROPOFOL 150 MG: 10 INJECTION, EMULSION INTRAVENOUS at 12:57

## 2022-08-08 RX ADMIN — PROPOFOL 120 MCG/KG/MIN: 10 INJECTION, EMULSION INTRAVENOUS at 12:57

## 2022-08-08 NOTE — ANESTHESIA POSTPROCEDURE EVALUATION
Post-Op Assessment Note    CV Status:  Stable       Mental Status:  Sleepy, lethargic and arousable   Hydration Status:  Stable   PONV Controlled:  None   Airway Patency:  Patent      Post Op Vitals Reviewed: Yes      Staff: Anesthesiologist, CRNA         No complications documented      BP   126/79   Temp      Pulse  113   Resp   22   SpO2   98

## 2022-08-08 NOTE — H&P
History and Physical   Colon and Rectal Surgery   Yusef Valle Sr  78 y o  male MRN: [id number]  Unit/Bed#:  Encounter: [id number]  08/08/22   12:49 PM      CC: History of polyps    History of Present Illness   HPI:  Yusef Valle Sr  is a 78 y o  male with no GI symptoms  Historical Information   Past Medical History:   Diagnosis Date    Atrial fibrillation ([address] )     Colon polyp     Hyperlipidemia     Hypertension     Nonocclusive coronary atherosclerosis of native coronary artery 03/26/2015     Past Surgical History:   Procedure Laterality Date    APPENDECTOMY      BACK SURGERY  1996    disc repair    CARDIAC CATHETERIZATION      50% lesion of mid LAD, 40% lesion of first obtuse marginal lesion  Last assessed 1/17/2018     CHOLECYSTECTOMY      COLON SURGERY      colon ressection    COLONOSCOPY N/A 1/14/2019    Procedure: COLONOSCOPY;  Surgeon: Jessica Falk MD;  [facility]: [facility];  Service: Colorectal    CYSTOSCOPY      Diagnostic     HEMORRHOID SURGERY  08/2011    TONSILLECTOMY         Meds/Allergies     (Not in a hospital admission)        Current Outpatient Medications:     colesevelam (WELCHOL) 625 mg tablet, Take 1 tablet (625 mg total) by mouth daily, Disp: 90 tablet, Rfl: 3    folic acid (FOLVITE) 1 mg tablet, TAKE (1) TABLET BY MOUTH DAILY  , Disp: 30 tablet, Rfl: 4    losartan (COZAAR) 100 MG tablet, TAKE (1) TABLET BY MOUTH DAILY  , Disp: 30 tablet, Rfl: 4    metoprolol tartrate (LOPRESSOR) 50 mg tablet, Take 1 tablet (50 mg total) by mouth every 12 (twelve) hours, Disp: 60 tablet, Rfl: 4    NIFEdipine (PROCARDIA XL) 90 mg 24 hr tablet, TAKE 1 TABLET DAILY  , Disp: 30 tablet, Rfl: 4    omeprazole (PriLOSEC) 20 mg delayed release capsule, TAKE (1) CAPSULE DAILY  , Disp: 30 capsule, Rfl: 4    predniSONE 5 mg tablet, TAKE ONE TABLET DAILY  , Disp: 30 tablet, Rfl: 4    rosuvastatin (CRESTOR) 20 MG tablet, TAKE ONE TABLET BY MOUTH DAILY  , Disp: 30 tablet, Rfl: 4    spironolactone (ALDACTONE) 25 mg tablet, Take 1 tablet (25 mg total) by mouth daily, Disp: 30 tablet, Rfl: 4    Eliquis 5 MG, TAKE ONE TABLET BY MOUTH TWICE A DAY , Disp: 60 tablet, Rfl: 6    furosemide (LASIX) 40 mg tablet, TAKE ONE TABLET BY MOUTH TWICE A DAY , Disp: 60 tablet, Rfl: 4    lisinopril (ZESTRIL) 20 mg tablet, TAKE (1) TABLET BY MOUTH TWICE DAILY  , Disp: 60 tablet, Rfl: 4    No Known Allergies      Social History   Social History     Substance and Sexual Activity   Alcohol Use Yes    Alcohol/week: 4 0 standard drinks    Types: 4 Cans of beer per week    Comment: daily     Social History     Substance and Sexual Activity   Drug Use No     Social History     Tobacco Use   Smoking Status Former Smoker   Smokeless Tobacco Former User         Family History:   Family History   Problem Relation Age of Onset    Diabetes Mother     Stroke Mother      Review of Systems - General ROS: negative  Cardiovascular ROS: negative  Gastrointestinal ROS: negative     Objective     Current Vitals:   Blood Pressure: 145/95 (08/08/22 1135)  Pulse: 102 (08/08/22 1135)  Temperature: 98 8 °F (37 1 °C) (08/08/22 1135)  Temp Source: Tympanic (08/08/22 1135)  Respirations: 16 (08/08/22 1135)  SpO2: 97 % (08/08/22 1135)  No intake or output data in the 24 hours ending 08/08/22 1249    Physical Exam:  General:  Well nourished, no distress  Neuro: Alert and oriented  Eyes:Sclera anicteric, conjunctiva pink  Pulm: Clear to auscultation bilaterally  No respiratory Distress  CV:  Regular rate and rhythm  No murmurs  Abdomen:  Soft, flat, non-tender, without masses or hepatosplenomegaly  Lab Results:       ASSESSMENT:  Cooper Mcgowan Sr  is a 78 y o  male for surveillance of polyps  PLAN:  Colonoscopy  Risks , including, but not limited to, bleeding, perforation, missed lesions, and potential need for surgery, were reviewed  Alternatives to colonoscopy were discussed    Claude Lua MD

## 2022-08-16 ENCOUNTER — TELEPHONE (OUTPATIENT)
Dept: FAMILY MEDICINE CLINIC | Facility: CLINIC | Age: 77
End: 2022-08-16

## 2022-08-16 DIAGNOSIS — L23.7 POISON IVY: Primary | ICD-10-CM

## 2022-08-16 RX ORDER — TRIAMCINOLONE ACETONIDE 1 MG/G
CREAM TOPICAL 2 TIMES DAILY
Qty: 30 G | Refills: 0 | Status: SHIPPED | OUTPATIENT
Start: 2022-08-16

## 2022-08-16 NOTE — TELEPHONE ENCOUNTER
Sent triamcinolone cream to use bid   He can take 10mg of prednisone for 5-7 days and claritin daily for itch

## 2022-08-16 NOTE — TELEPHONE ENCOUNTER
Pt's wife said pt got poison ivy over weekend for the first time, asking if you could rx anything/ what he should be doing for it?

## 2022-08-19 ENCOUNTER — TELEPHONE (OUTPATIENT)
Dept: FAMILY MEDICINE CLINIC | Facility: CLINIC | Age: 77
End: 2022-08-19

## 2022-08-19 ENCOUNTER — TELEPHONE (OUTPATIENT)
Dept: UROLOGY | Facility: CLINIC | Age: 77
End: 2022-08-19

## 2022-08-19 DIAGNOSIS — N50.89 ENLARGEMENT OF SCROTAL SAC: Primary | ICD-10-CM

## 2022-08-19 NOTE — TELEPHONE ENCOUNTER
Pt's wife called asking if you can place ultrasound for pt before his appointment with Urology  Pt had colonoscopy done and was told he needed to be evaluated by urology for large scrotum and large testes  Pt's appointment with urology is 09/08/22  no

## 2022-08-19 NOTE — TELEPHONE ENCOUNTER
Received incoming call from patients wife that Dr Narinder Britt had recommended patient be seen for enlarged scrotum and testicle  Patient reports no current pain  He was scheduled for NP appointment with Obed Rosen on 9/8/22 @ 0800 in [facility]  Wife will contact Dr Almeida Roles office to see if [address] scrotum/testicles can be ordered for patient in the meantime to rule out any abnormalities

## 2022-08-23 ENCOUNTER — HOSPITAL ENCOUNTER (OUTPATIENT)
Dept: ULTRASOUND IMAGING | Facility: HOSPITAL | Age: 77
Discharge: HOME | End: 2022-08-23
Attending: INTERNAL MEDICINE
Payer: MEDICARE

## 2022-08-23 DIAGNOSIS — N50.89 ENLARGEMENT OF SCROTAL SAC: ICD-10-CM

## 2022-08-23 PROCEDURE — 76870 US EXAM SCROTUM: CPT

## 2022-09-08 ENCOUNTER — OFFICE VISIT (OUTPATIENT)
Dept: UROLOGY | Facility: CLINIC | Age: 77
End: 2022-09-08
Payer: MEDICARE

## 2022-09-08 VITALS
DIASTOLIC BLOOD PRESSURE: 80 MMHG | WEIGHT: 250 LBS | SYSTOLIC BLOOD PRESSURE: 130 MMHG | BODY MASS INDEX: 37.03 KG/M2 | HEIGHT: 69 IN

## 2022-09-08 DIAGNOSIS — N43.3 HYDROCELE, BILATERAL: Primary | ICD-10-CM

## 2022-09-08 DIAGNOSIS — Z86.010 PERSONAL HISTORY OF COLONIC POLYPS: ICD-10-CM

## 2022-09-08 PROCEDURE — 99203 OFFICE O/P NEW LOW 30 MIN: CPT

## 2022-09-08 NOTE — PROGRESS NOTES
9/8/2022    Chief Complaint   Patient presents with    New Patient Visit       Assessment and Plan    78 y o  male new patient to office    1  Bilateral Hydroceles   · US scrotum and testicle 8/23/2022  · Moderate bilateral hydroceles   · Physical exam reveals bilateral hydroceles left larger than the right  · Patient is asymptomatic  · Recommend conservative management with scrotal support, heat/ice, and NSAIDs for pain  · Discussed that if they become bothersome in the future we can try needle aspiration versus hydrocelectomy  · Follow-up as need      History of Present Illness  Qamar Denton Sr  is a 78 y o  male here for evaluation of testicular swelling  He reports that he underwent a colonoscopy and it was recommended by his gastroenterologist that he should have his testicular swelling evaluated  Patient report he did not notice any changed to his testicles or scrotum and denies every experiencing any pain  He denies any difficulty with urination, frequency, urgency, fever, chills, gross hematuria, abdominal pain, flank pain  Ultrasound scrotum and testicles was completed on 08/23/2022 which showed moderate bilateral hydroceles, unremarkable testes  Review of Systems   Constitutional: Negative for chills and fever  HENT: Negative for congestion and sore throat  Respiratory: Negative for cough and shortness of breath  Cardiovascular: Negative for chest pain and leg swelling  Gastrointestinal: Negative for abdominal pain, constipation, diarrhea, nausea and vomiting  Genitourinary: Positive for scrotal swelling  Negative for difficulty urinating, dysuria, flank pain, frequency, hematuria, testicular pain and urgency  Musculoskeletal: Negative for back pain and gait problem  Skin: Negative for wound  Allergic/Immunologic: Negative for immunocompromised state  Neurological: Negative for dizziness, weakness and numbness  Hematological: Does not bruise/bleed easily  AUA SYMPTOM SCORE    Flowsheet Row Most Recent Value   AUA SYMPTOM SCORE    How often have you had a sensation of not emptying your bladder completely after you finished urinating? 0 (P)     How often have you had to urinate again less than two hours after you finished urinating? 0 (P)     How often have you found you stopped and started again several times when you urinate? 0 (P)     How often have you found it difficult to postpone urination? 0 (P)     How often have you had a weak urinary stream? 0 (P)     How often have you had to push or strain to begin urination? 0 (P)     How many times did you most typically get up to urinate from the time you went to bed at night until the time you got up in the morning? 2 (P)     Quality of Life: If you were to spend the rest of your life with your urinary condition just the way it is now, how would you feel about that? 1 (P)     AUA SYMPTOM SCORE 2 (P)           Vitals  Vitals:    09/08/22 0802   BP: 130/80   Weight: 113 kg (250 lb)   Height: 5' 9" (1 753 m)       Physical Exam  Vitals reviewed  Constitutional:       General: He is not in acute distress  Appearance: Normal appearance  He is not ill-appearing or toxic-appearing  HENT:      Head: Normocephalic and atraumatic  Eyes:      General: No scleral icterus  Conjunctiva/sclera: Conjunctivae normal    Cardiovascular:      Rate and Rhythm: Normal rate  Pulmonary:      Effort: Pulmonary effort is normal  No respiratory distress  Abdominal:      General: Abdomen is flat  Palpations: Abdomen is soft  Tenderness: There is no abdominal tenderness  There is no right CVA tenderness or left CVA tenderness  Hernia: No hernia is present  Genitourinary:     Comments: Normal circumcised phallus  Testicles easily palpable and are smooth and symmetric  Bilateral hydroceles noted on exam, left greater than right   Negative pain with palpation, benign exam   Musculoskeletal:      Cervical back: Normal range of motion  Right lower leg: No edema  Left lower leg: No edema  Skin:     General: Skin is warm and dry  Coloration: Skin is not jaundiced or pale  Neurological:      General: No focal deficit present  Mental Status: He is alert and oriented to person, place, and time  Mental status is at baseline  Gait: Gait normal    Psychiatric:         Mood and Affect: Mood normal          Behavior: Behavior normal          Thought Content: Thought content normal          Judgment: Judgment normal          Past History  Past Medical History:   Diagnosis Date    Atrial fibrillation ([address] )     Colon polyp     Hyperlipidemia     Hypertension     Nonocclusive coronary atherosclerosis of native coronary artery 03/26/2015     Social History     Socioeconomic History    Marital status: /Civil Union     Spouse name: None    Number of children: None    Years of education: None    Highest education level: None   Occupational History    None   Tobacco Use    Smoking status: Former Smoker    Smokeless tobacco: Former User   Vaping Use    Vaping Use: Never used   Substance and Sexual Activity    Alcohol use:  Yes     Alcohol/week: 4 0 standard drinks     Types: 4 Cans of beer per week     Comment: daily    Drug use: No    Sexual activity: None   Other Topics Concern    None   Social History Narrative    Dental care, regularly    Good dental hygiene    No advance directives    No caffeine use    Uses safety equipment - Seatbelts      Social Determinants of Health     Financial Resource Strain: Not on file   Food Insecurity: Not on file   Transportation Needs: Not on file   Physical Activity: Not on file   Stress: Not on file   Social Connections: Not on file   Intimate Partner Violence: Not on file   Housing Stability: Not on file     Social History     Tobacco Use   Smoking Status Former Smoker   Smokeless Tobacco Former User     Family History   Problem Relation Age of Onset    Diabetes Mother     Stroke Mother        The following portions of the patient's history were reviewed and updated as appropriate allergies, current medications, past medical history, past social history, past surgical history and problem list    [id number]  SCROTAL ULTRASOUND     INDICATION:  N50 89: Other specified disorders of the male genital organs  Left testicle swelling      COMPARISON: None     TECHNIQUE:   Ultrasound the scrotal contents was performed with a high frequency linear transducer utilizing volumetric sweep imaging as well as standard still image techniques  Imaging performed in longitudinal and transverse orientation  Color and   spectral Doppler evaluation also performed bilaterally      FINDINGS:     TESTES:   Testes are symmetric and normal in size      RIGHT testis = 3 9 x 3 0 x 4 2 cm  Volume 25 5 mL  Normal contour with homogeneous smooth echotexture  No intratesticular mass lesion or calcifications      LEFT testis = 5 3 x 2 8 x 3 6 cm  Volume 27 9 mL  Normal contour with homogeneous smooth echotexture  No intratesticular mass lesion or calcifications      Doppler flow within both testes is present and symmetric      EPIDIDYMIDES:   Normal size  Doppler ultrasound demonstrates normal blood flow  No epididymal lesions      HYDROCELE:  Moderate bilateral hydroceles      VARICOCELE:  None present      SCROTUM:  Scrotal thickness and appearance within normal limits  No evidence for extratesticular mass or hernia demonstrated         IMPRESSION:     Unremarkable testes      Moderate bilateral hydroceles            Results  No results found for this or any previous visit (from the past 1 hour(s)) ]  Lab Results   Component Value Date    PSA 0 4 2020    PSA 0 4 2015    PSA 0 42 2012     Lab Results   Component Value Date    GLUCOSE 136 2015    CALCIUM 9 1 2022     2015    K 3 5 2022    CO2 27 2022     2022 BUN 21 05/12/2022    CREATININE 1 15 05/12/2022     Lab Results   Component Value Date    WBC 10 93 (H) 06/25/2021    HGB 15 2 06/25/2021    HCT 47 4 06/25/2021    MCV 88 06/25/2021     06/25/2021       Please Note:  Voice dictation software has been used to create this document  There may be inadvertent transcriptions errors       Frederic Granados

## 2022-09-13 ENCOUNTER — OFFICE VISIT (OUTPATIENT)
Dept: FAMILY MEDICINE CLINIC | Facility: CLINIC | Age: 77
End: 2022-09-13
Payer: MEDICARE

## 2022-09-13 VITALS
BODY MASS INDEX: 37.2 KG/M2 | DIASTOLIC BLOOD PRESSURE: 70 MMHG | HEART RATE: 98 BPM | WEIGHT: 251.2 LBS | OXYGEN SATURATION: 97 % | TEMPERATURE: 98 F | SYSTOLIC BLOOD PRESSURE: 130 MMHG | HEIGHT: 69 IN

## 2022-09-13 DIAGNOSIS — I10 ESSENTIAL (PRIMARY) HYPERTENSION: ICD-10-CM

## 2022-09-13 DIAGNOSIS — R73.9 HYPERGLYCEMIA: ICD-10-CM

## 2022-09-13 DIAGNOSIS — Z00.00 MEDICARE ANNUAL WELLNESS VISIT, SUBSEQUENT: Primary | ICD-10-CM

## 2022-09-13 PROCEDURE — G0439 PPPS, SUBSEQ VISIT: HCPCS | Performed by: INTERNAL MEDICINE

## 2022-09-13 NOTE — PROGRESS NOTES
Assessment and Plan:     Problem List Items Addressed This Visit        Other    Medicare annual wellness visit, subsequent - Primary      Other Visit Diagnoses     Hyperglycemia        Relevant Orders    HEMOGLOBIN A1C W/ EAG ESTIMATION    Comprehensive metabolic panel    LDL cholesterol, direct    Essential (primary) hypertension         Relevant Orders    LDL cholesterol, direct      Rx for labs to recheck blood sugar Lo carb diet/exercise encouraged  Eye exam utd  Increase hydration   Deferred flu shot   Colon utd  Rto 4months     Depression Screening and Follow-up Plan: Patient was screened for depression during today's encounter  They screened negative with a PHQ-2 score of 0  Preventive health issues were discussed with patient, and age appropriate screening tests were ordered as noted in patient's After Visit Summary  Personalized health advice and appropriate referrals for health education or preventive services given if needed, as noted in patient's After Visit Summary       History of Present Illness:     Patient presents for a Medicare Wellness Visit    HPI   Patient Care Team:  Zahira Soto DO as PCP - General  DO Zahira Robins, Dmitriy Mckinney MD as Endoscopist     Review of Systems:     Review of Systems     Problem List:     Patient Active Problem List   Diagnosis    Aortic stenosis, mild    Chronic diastolic CHF (congestive heart failure) ([facility] )    Nonocclusive coronary atherosclerosis of native coronary artery    Dyslipidemia    Essential hypertension    Obstructive sleep apnea of adult    Paroxysmal atrial fibrillation (HCC)    Paroxysmal atrial tachycardia (HCC)    Allergic rhinitis due to pollen    Erectile dysfunction    GERD without esophagitis    Class 2 severe obesity due to excess calories with serious comorbidity and body mass index (BMI) of 37 0 to 37 9 in [facility])    Renovascular hypertension    Rheumatoid arthritis ([facility] )    History of colon polyps    Medicare annual wellness visit, subsequent    Mixed hyperlipidemia    Hypertensive heart disease with congestive heart failure ([facility] )      Past Medical and Surgical History:     Past Medical History:   Diagnosis Date    Atrial fibrillation ([facility]     Colon polyp     Hyperlipidemia     Hypertension     Nonocclusive coronary atherosclerosis of native coronary artery 03/26/2015     Past Surgical History:   Procedure Laterality Date    APPENDECTOMY      BACK SURGERY  1996    disc repair    CARDIAC CATHETERIZATION      50% lesion of mid LAD, 40% lesion of first obtuse marginal lesion  Last assessed 1/17/2018     CHOLECYSTECTOMY      COLON SURGERY      colon ressection    COLONOSCOPY N/A 1/14/2019    Procedure: COLONOSCOPY;  Surgeon: Moriah Lewis MD;  Location: [facility];  Service: Colorectal    CYSTOSCOPY      Diagnostic     HEMORRHOID SURGERY  08/2011    TONSILLECTOMY        Family History:     Family History   Problem Relation Age of Onset    Diabetes Mother     Stroke Mother       Social History:     Social History     Socioeconomic History    Marital status: /Civil Union     Spouse name: None    Number of children: None    Years of education: None    Highest education level: None   Occupational History    None   Tobacco Use    Smoking status: Former Smoker    Smokeless tobacco: Former User   Vaping Use    Vaping Use: Never used   Substance and Sexual Activity    Alcohol use:  Yes     Alcohol/week: 4 0 standard drinks     Types: 4 Cans of beer per week     Comment: daily    Drug use: No    Sexual activity: None   Other Topics Concern    None   Social History Narrative    Dental care, regularly    Good dental hygiene    No advance directives    No caffeine use    Uses safety equipment - Seatbelts      Social Determinants of Health     Financial Resource Strain: Low Risk     Difficulty of Paying Living Expenses: Not very hard   Food Insecurity: Not on file Transportation Needs: No Transportation Needs    Lack of Transportation (Medical): No    Lack of Transportation (Non-Medical): No   Physical Activity: Not on file   Stress: Not on file   Social Connections: Not on file   Intimate Partner Violence: Not on file   Housing Stability: Not on file      Medications and Allergies:     Current Outpatient Medications   Medication Sig Dispense Refill    colesevelam (WELCHOL) 625 mg tablet Take 1 tablet (625 mg total) by mouth daily 90 tablet 3    Eliquis 5 MG TAKE ONE TABLET BY MOUTH TWICE A DAY  60 tablet 6    folic acid (FOLVITE) 1 mg tablet TAKE (1) TABLET BY MOUTH DAILY  30 tablet 4    furosemide (LASIX) 40 mg tablet TAKE ONE TABLET BY MOUTH TWICE A DAY  60 tablet 4    lisinopril (ZESTRIL) 20 mg tablet TAKE (1) TABLET BY MOUTH TWICE DAILY  60 tablet 4    losartan (COZAAR) 100 MG tablet TAKE (1) TABLET BY MOUTH DAILY  30 tablet 4    metoprolol tartrate (LOPRESSOR) 50 mg tablet Take 1 tablet (50 mg total) by mouth every 12 (twelve) hours 60 tablet 4    NIFEdipine (PROCARDIA XL) 90 mg 24 hr tablet TAKE 1 TABLET DAILY  30 tablet 4    omeprazole (PriLOSEC) 20 mg delayed release capsule TAKE (1) CAPSULE DAILY  30 capsule 4    predniSONE 5 mg tablet TAKE ONE TABLET DAILY  30 tablet 4    rosuvastatin (CRESTOR) 20 MG tablet TAKE ONE TABLET BY MOUTH DAILY  30 tablet 4    triamcinolone (KENALOG) 0 1 % cream Apply topically 2 (two) times a day (Patient not taking: Reported on 9/13/2022) 30 g 0     No current facility-administered medications for this visit       No Known Allergies   Immunizations:     Immunization History   Administered Date(s) Administered    COVID-19 MODERNA VACC 0 5 ML IM 03/25/2021, 04/23/2021    Influenza, seasonal, injectable 04/18/2016    Pneumococcal Polysaccharide PPV23 04/18/2016      Health Maintenance:         Topic Date Due    Colorectal Cancer Screening  08/07/2027    Hepatitis C Screening  Completed         Topic Date Due    Pneumococcal Vaccine: 65+ Years (2 - PCV) 04/18/2017    COVID-19 Vaccine (3 - Booster for Moderna series) 09/23/2021    Influenza Vaccine (1) 09/01/2022      Medicare Screening Tests and Risk Assessments:     Rahel Hopkins is here for his Subsequent Wellness visit  Last Medicare Wellness visit information reviewed, patient interviewed and updates made to the record today  Health Risk Assessment:   Patient rates overall health as fair  Patient feels that their physical health rating is same  Patient is very satisfied with their life  Eyesight was rated as same  Hearing was rated as same  Patient feels that their emotional and mental health rating is same  Patients states they are never, rarely angry  Patient states they are sometimes unusually tired/fatigued  Pain experienced in the last 7 days has been some  Patient's pain rating has been 2/10  Patient states that he has experienced weight loss or gain in last 6 months  Depression Screening:   PHQ-2 Score: 0      Fall Risk Screening: In the past year, patient has experienced: no history of falling in past year      Home Safety:  Patient does not have trouble with stairs inside or outside of their home  Patient has working smoke alarms and has working carbon monoxide detector  Home safety hazards include: none  Nutrition:   Current diet is Regular  Medications:   Patient is currently taking over-the-counter supplements  OTC medications include: see medication list  Patient is able to manage medications  Activities of Daily Living (ADLs)/Instrumental Activities of Daily Living (IADLs):   Walk and transfer into and out of bed and chair?: Yes  Dress and groom yourself?: Yes    Bathe or shower yourself?: Yes    Feed yourself?  Yes  Do your laundry/housekeeping?: Yes  Manage your money, pay your bills and track your expenses?: Yes  Make your own meals?: Yes    Do your own shopping?: Yes    Previous Hospitalizations:   Any hospitalizations or ED visits within the last 12 months?: No      Advance Care Planning:   Living will: Yes    Durable POA for healthcare: Yes    Advanced directive: Yes    End of Life Decisions reviewed with patient: Yes    Provider agrees with end of life decisions: Yes      Cognitive Screening:   Provider or family/friend/caregiver concerned regarding cognition?: No    PREVENTIVE SCREENINGS      Cardiovascular Screening:    General: History Lipid Disorder and Screening Current      Diabetes Screening:     General: Screening Current      Colorectal Cancer Screening:     General: Screening Current      Prostate Cancer Screening:    General: Screening Not Indicated      Osteoporosis Screening:    General: Screening Not Indicated      Abdominal Aortic Aneurysm (AAA) Screening:    Risk factors include: age between 73-67 yo and tobacco use        Lung Cancer Screening:     General: Screening Not Indicated      Hepatitis C Screening:    General: Screening Current    Screening, Brief Intervention, and Referral to Treatment (SBIRT)    Screening  Typical number of drinks in a day: 12  Typical number of drinks in a week: 12  Interpretation: Risky drinking behavior  AUDIT-C Screenin) How often did you have a drink containing alcohol in the past year? 4 or more times a week  2) How many drinks did you have on a typical day when you were drinking in the past year? 3 to 4  3) How often did you have 6 or more drinks on one occasion in the past year? less than monthly    AUDIT-C Score: 5  Interpretation: Score 4-12 (male): POSITIVE screen for alcohol misuse    AUDIT Screenin) How often during the last year have you found that you were not able to stop drinking once you had started?  0 - never  5) How often during the last year have you failed to do what was normally expected from you because of drinking? 0 - never  6) How often during the last year have you needed a first drink in the morning to get yourself going after a heavy drinking session? 0 - never  7) How often during the last year have you had a feeling of guilt or remorse after drinking? 0 - never  8) How often during the last year have you been unable to remember what happened the night before because you had been drinking? 0 - never  9) Have you or someone else been injured as a result of your drinking? 0 - no  10) Has a relative or friend or a doctor or another health worker been concerned about your drinking or suggested you cut down? 0 - no    AUDIT Score: 5  Interpretation: Low risk alcohol consumption    Single Item Drug Screening:  How often have you used an illegal drug (including marijuana) or a prescription medication for non-medical reasons in the past year? never    Single Item Drug Screen Score: 0  Interpretation: Negative screen for possible drug use disorder    Brief Intervention  Alcohol & drug use screenings were reviewed  No concerns regarding substance use disorder identified  Other Counseling Topics:   Calcium and vitamin D intake  No exam data present     Physical Exam:     /70   Pulse 98   Temp 98 °F (36 7 °C)   Ht 5' 9" (1 753 m)   Wt 114 kg (251 lb 3 2 oz)   SpO2 97%   BMI 37 10 kg/m²     Physical Exam  Vitals reviewed  Constitutional:       Appearance: Normal appearance  HENT:      Head: Normocephalic and atraumatic  Right Ear: External ear normal       Left Ear: External ear normal       Nose: Nose normal       Mouth/Throat:      Mouth: Mucous membranes are dry  Eyes:      General: No scleral icterus  Extraocular Movements: Extraocular movements intact  Conjunctiva/sclera: Conjunctivae normal       Pupils: Pupils are equal, round, and reactive to light  Cardiovascular:      Rate and Rhythm: Normal rate and regular rhythm  Pulses: Normal pulses  Heart sounds: Normal heart sounds  Pulmonary:      Effort: Pulmonary effort is normal       Breath sounds: Normal breath sounds     Abdominal:      General: Bowel sounds are normal       Palpations: Abdomen is soft  Musculoskeletal:      Cervical back: Normal range of motion and neck supple  No rigidity  Right lower leg: No edema  Left lower leg: No edema  Lymphadenopathy:      Cervical: No cervical adenopathy  Skin:     General: Skin is dry  Coloration: Skin is not jaundiced or pale  Neurological:      General: No focal deficit present  Mental Status: He is alert and oriented to person, place, and time  Mental status is at baseline  Psychiatric:         Mood and Affect: Mood normal          Behavior: Behavior normal          Thought Content:  Thought content normal          Judgment: Judgment normal           Aime lCark DO

## 2022-09-13 NOTE — PATIENT INSTRUCTIONS
Medicare Preventive Visit Patient Instructions  Thank you for completing your Welcome to Medicare Visit or Medicare Annual Wellness Visit today  Your next wellness visit will be due in one year (9/14/2023)  The screening/preventive services that you may require over the next 5-10 years are detailed below  Some tests may not apply to you based off risk factors and/or age  Screening tests ordered at today's visit but not completed yet may show as past due  Also, please note that scanned in results may not display below  Preventive Screenings:  Service Recommendations Previous Testing/Comments   Colorectal Cancer Screening  · Colonoscopy    · Fecal Occult Blood Test (FOBT)/Fecal Immunochemical Test (FIT)  · Fecal DNA/Cologuard Test  · Flexible Sigmoidoscopy Age: 39-70 years old   Colonoscopy: every 10 years (May be performed more frequently if at higher risk)  OR  FOBT/FIT: every 1 year  OR  Cologuard: every 3 years  OR  Sigmoidoscopy: every 5 years  Screening may be recommended earlier than age 39 if at higher risk for colorectal cancer  Also, an individualized decision between you and your healthcare provider will decide whether screening between the ages of 74-80 would be appropriate   Colonoscopy: 08/08/2022  FOBT/FIT: Not on file  Cologuard: Not on file  Sigmoidoscopy: Not on file    Screening Current     Prostate Cancer Screening Individualized decision between patient and health care provider in men between ages of 53-78   Medicare will cover every 12 months beginning on the day after your 50th birthday PSA: 0 4 ng/mL     Screening Not Indicated     Hepatitis C Screening Once for adults born between 1945 and 1965  More frequently in patients at high risk for Hepatitis C Hep C Antibody: 06/20/2019    Screening Current   Diabetes Screening 1-2 times per year if you're at risk for diabetes or have pre-diabetes Fasting glucose: 137 mg/dL (5/12/2022)  A1C: 6 2 % (5/12/2022)  Screening Current   Cholesterol Screening Once every 5 years if you don't have a lipid disorder  May order more often based on risk factors  Lipid panel: 05/12/2022  Screening Not Indicated  History Lipid Disorder      Other Preventive Screenings Covered by Medicare:  1  Abdominal Aortic Aneurysm (AAA) Screening: covered once if your at risk  You're considered to be at risk if you have a family history of AAA or a male between the age of 73-68 who smoking at least 100 cigarettes in your lifetime  2  Lung Cancer Screening: covers low dose CT scan once per year if you meet all of the following conditions: (1) Age 50-69; (2) No signs or symptoms of lung cancer; (3) Current smoker or have quit smoking within the last 15 years; (4) You have a tobacco smoking history of at least 20 pack years (packs per day x number of years you smoked); (5) You get a written order from a healthcare provider  3  Glaucoma Screening: covered annually if you're considered high risk: (1) You have diabetes OR (2) Family history of glaucoma OR (3)  aged 48 and older OR (3)  American aged 72 and older  3  Osteoporosis Screening: covered every 2 years if you meet one of the following conditions: (1) Have a vertebral abnormality; (2) On glucocorticoid therapy for more than 3 months; (3) Have primary hyperparathyroidism; (4) On osteoporosis medications and need to assess response to drug therapy  5  HIV Screening: covered annually if you're between the age of 12-76  Also covered annually if you are younger than 13 and older than 72 with risk factors for HIV infection  For pregnant patients, it is covered up to 3 times per pregnancy      Immunizations:  Immunization Recommendations   Influenza Vaccine Annual influenza vaccination during flu season is recommended for all persons aged >= 6 months who do not have contraindications   Pneumococcal Vaccine   * Pneumococcal conjugate vaccine = PCV13 (Prevnar 13), PCV15 (Vaxneuvance), PCV20 (Prevnar 20)  * Pneumococcal polysaccharide vaccine = PPSV23 (Pneumovax) Adults 2364 years old: 1-3 doses may be recommended based on certain risk factors  Adults 72 years old: 1-2 doses may be recommended based off what pneumonia vaccine you previously received   Hepatitis B Vaccine 3 dose series if at intermediate or high risk (ex: diabetes, end stage renal disease, liver disease)   Tetanus (Td) Vaccine - COST NOT COVERED BY MEDICARE PART B Following completion of primary series, a booster dose should be given every 10 years to maintain immunity against tetanus  Td may also be given as tetanus wound prophylaxis  Tdap Vaccine - COST NOT COVERED BY MEDICARE PART B Recommended at least once for all adults  For pregnant patients, recommended with each pregnancy  Shingles Vaccine (Shingrix) - COST NOT COVERED BY MEDICARE PART B  2 shot series recommended in those aged 48 and above     Health Maintenance Due:      Topic Date Due    Colorectal Cancer Screening  08/07/2027    Hepatitis C Screening  Completed     Immunizations Due:      Topic Date Due    Pneumococcal Vaccine: 65+ Years (2 - PCV) 04/18/2017    COVID-19 Vaccine (3 - Booster for Moderna series) 09/23/2021    Influenza Vaccine (1) 09/01/2022     Advance Directives   What are advance directives? Advance directives are legal documents that state your wishes and plans for medical care  These plans are made ahead of time in case you lose your ability to make decisions for yourself  Advance directives can apply to any medical decision, such as the treatments you want, and if you want to donate organs  What are the types of advance directives? There are many types of advance directives, and each state has rules about how to use them  You may choose a combination of any of the following:  · Living will: This is a written record of the treatment you want  You can also choose which treatments you do not want, which to limit, and which to stop at a certain time   This includes surgery, medicine, IV fluid, and tube feedings  · Durable power of  for healthcare [facility]): This is a written record that states who you want to make healthcare choices for you when you are unable to make them for yourself  This person, called a proxy, is usually a family member or a friend  You may choose more than 1 proxy  · Do not resuscitate (DNR) order:  A DNR order is used in case your heart stops beating or you stop breathing  It is a request not to have certain forms of treatment, such as CPR  A DNR order may be included in other types of advance directives  · Medical directive: This covers the care that you want if you are in a coma, near death, or unable to make decisions for yourself  You can list the treatments you want for each condition  Treatment may include pain medicine, surgery, blood transfusions, dialysis, IV or tube feedings, and a ventilator (breathing machine)  · Values history: This document has questions about your views, beliefs, and how you feel and think about life  This information can help others choose the care that you would choose  Why are advance directives important? An advance directive helps you control your care  Although spoken wishes may be used, it is better to have your wishes written down  Spoken wishes can be misunderstood, or not followed  Treatments may be given even if you do not want them  An advance directive may make it easier for your family to make difficult choices about your care  Weight Management   Why it is important to manage your weight:  Being overweight increases your risk of health conditions such as heart disease, high blood pressure, type 2 diabetes, and certain types of cancer  It can also increase your risk for osteoarthritis, sleep apnea, and other respiratory problems  Aim for a slow, steady weight loss  Even a small amount of weight loss can lower your risk of health problems    How to lose weight safely:  A safe and healthy way to lose weight is to eat fewer calories and get regular exercise  You can lose up about 1 pound a week by decreasing the number of calories you eat by 500 calories each day  Healthy meal plan for weight management:  A healthy meal plan includes a variety of foods, contains fewer calories, and helps you stay healthy  A healthy meal plan includes the following:  · Eat whole-grain foods more often  A healthy meal plan should contain fiber  Fiber is the part of grains, fruits, and vegetables that is not broken down by your body  Whole-grain foods are healthy and provide extra fiber in your diet  Some examples of whole-grain foods are whole-wheat breads and pastas, oatmeal, brown rice, and bulgur  · Eat a variety of vegetables every day  Include dark, leafy greens such as spinach, kale, catherine greens, and mustard greens  Eat yellow and orange vegetables such as carrots, sweet potatoes, and winter squash  · Eat a variety of fruits every day  Choose fresh or canned fruit (canned in its own juice or light syrup) instead of juice  Fruit juice has very little or no fiber  · Eat low-fat dairy foods  Drink fat-free (skim) milk or 1% milk  Eat fat-free yogurt and low-fat cottage cheese  Try low-fat cheeses such as mozzarella and other reduced-fat cheeses  · Choose meat and other protein foods that are low in fat  Choose beans or other legumes such as split peas or lentils  Choose fish, skinless poultry (chicken or turkey), or lean cuts of red meat (beef or pork)  Before you cook meat or poultry, cut off any visible fat  · Use less fat and oil  Try baking foods instead of frying them  Add less fat, such as margarine, sour cream, regular salad dressing and mayonnaise to foods  Eat fewer high-fat foods  Some examples of high-fat foods include french fries, doughnuts, ice cream, and cakes  · Eat fewer sweets  Limit foods and drinks that are high in sugar   This includes candy, cookies, regular soda, and sweetened drinks  Exercise:  Exercise at least 30 minutes per day on most days of the week  Some examples of exercise include walking, biking, dancing, and swimming  You can also fit in more physical activity by taking the stairs instead of the elevator or parking farther away from stores  Ask your healthcare provider about the best exercise plan for you  Alcohol Use and Your Health    Drinking too much can harm your health  Excessive alcohol use leads to about 88,000 death in the [place] each year, and shortens the life of those who diet by almost 30 years  Further, excessive drinking cost the economy $249 billion in 2010  Most excessive drinkers are not alcohol dependent  Excessive alcohol use has immediate effects that increase the risk of many harmful health conditions  These are most often the result of binge drinking  Over time, excessive alcohol use can lead to the development of chronic diseases and other series health problems  What is considered a "drink"? Excessive alcohol use includes:  · Binge Drinking: For women, 4 or more drinks consumed on one occasion  For men, 5 or more drinks consumed on one occasion  · Heavy Drinking: For women, 8 or more drinks per week  For men, 15 or more drinks per week  · Any alcohol used by pregnant women  · Any alcohol used by those under the age of 21 years    If you choose to drink, do so in moderation:  · Do not drink at all if you are under the age of 24, or if you are or may be pregnant, or have health problems that could be made worse by drinking    · For women, up to 1 drink per day  · For men, up to 2 drinks a day    No one should begin drinking or drink more frequently based on potential health benefits    Short-Term Health Risks:  · Injuries: motor vehicle crashes, falls, drownings, burns  · Violence: homicide, suicide, sexual assault, intimate partner violence  · Alcohol poisoning  · Reproductive health: risky sexual behaviors, unintended prengnacy, sexually transmitted diseases, miscarriage, stillbirth, fetal alcohol syndrome    Long-Term Health Risks:  · Chronic diseases: high blood pressure, heart disease, stroke, liver disease, digestive problems  · Cancers: breast, mouth and throat, liver, colon  · Learning and memory problems: dementia, poor school performance  · Mental health: depression, anxiety, insomnia  · Social problems: lost productivity, family problems, unemployment  · Alcohol dependence    For support and more information:  · Substance Abuse and [facility] , [address] [place]  Web Address: https://[employer]/    · Alcoholics Anonymous        Web Address: http://[employer] info/    https://www cdc gov/alcohol/fact-sheets/alcohol-use htm     © [address] 2018 Information is for End User's use only and may not be sold, redistributed or otherwise used for commercial purposes   All illustrations and images included in CareNotes® are the copyrighted property of [employer]  or [address] [place] [address]

## 2022-11-12 PROBLEM — Z00.00 MEDICARE ANNUAL WELLNESS VISIT, SUBSEQUENT: Status: RESOLVED | Noted: 2020-03-03 | Resolved: 2022-11-12

## 2022-11-25 ENCOUNTER — TELEPHONE (OUTPATIENT)
Dept: FAMILY MEDICINE CLINIC | Facility: CLINIC | Age: 77
End: 2022-11-25

## 2022-11-25 DIAGNOSIS — E78.2 MIXED HYPERLIPIDEMIA: Primary | ICD-10-CM

## 2022-11-25 RX ORDER — EZETIMIBE 10 MG/1
10 TABLET ORAL DAILY
Qty: 30 TABLET | Refills: 5 | Status: SHIPPED | OUTPATIENT
Start: 2022-11-25

## 2022-11-25 NOTE — TELEPHONE ENCOUNTER
Let pt know insurance denied welchol Will need to try zetia unless using welchol for diarrhea then would try to use questran as option

## 2022-12-02 DIAGNOSIS — I50.32 CHF (CONGESTIVE HEART FAILURE), NYHA CLASS II, CHRONIC, DIASTOLIC (HCC): ICD-10-CM

## 2022-12-02 DIAGNOSIS — E78.5 DYSLIPIDEMIA: ICD-10-CM

## 2022-12-02 DIAGNOSIS — I48.19 PERSISTENT ATRIAL FIBRILLATION (HCC): ICD-10-CM

## 2022-12-02 DIAGNOSIS — K21.9 GASTROESOPHAGEAL REFLUX DISEASE: ICD-10-CM

## 2022-12-02 DIAGNOSIS — M19.90 ARTHRITIS: ICD-10-CM

## 2022-12-02 DIAGNOSIS — I10 ESSENTIAL HYPERTENSION: ICD-10-CM

## 2022-12-02 RX ORDER — METOPROLOL TARTRATE 50 MG/1
50 TABLET, FILM COATED ORAL EVERY 12 HOURS SCHEDULED
Qty: 60 TABLET | Refills: 4 | Status: SHIPPED | OUTPATIENT
Start: 2022-12-02

## 2022-12-02 RX ORDER — PREDNISONE 1 MG/1
TABLET ORAL
Qty: 30 TABLET | Refills: 4 | Status: SHIPPED | OUTPATIENT
Start: 2022-12-02

## 2022-12-02 RX ORDER — NIFEDIPINE 90 MG/1
TABLET, EXTENDED RELEASE ORAL
Qty: 30 TABLET | Refills: 4 | Status: SHIPPED | OUTPATIENT
Start: 2022-12-02

## 2022-12-02 RX ORDER — FUROSEMIDE 40 MG/1
TABLET ORAL
Qty: 60 TABLET | Refills: 4 | Status: SHIPPED | OUTPATIENT
Start: 2022-12-02

## 2022-12-02 RX ORDER — FOLIC ACID 1 MG/1
TABLET ORAL
Qty: 30 TABLET | Refills: 4 | Status: SHIPPED | OUTPATIENT
Start: 2022-12-02

## 2022-12-02 RX ORDER — ROSUVASTATIN CALCIUM 20 MG/1
TABLET, COATED ORAL
Qty: 30 TABLET | Refills: 4 | Status: SHIPPED | OUTPATIENT
Start: 2022-12-02

## 2022-12-02 RX ORDER — OMEPRAZOLE 20 MG/1
CAPSULE, DELAYED RELEASE ORAL
Qty: 30 CAPSULE | Refills: 4 | Status: SHIPPED | OUTPATIENT
Start: 2022-12-02

## 2023-01-04 DIAGNOSIS — I10 ESSENTIAL HYPERTENSION: ICD-10-CM

## 2023-01-04 RX ORDER — LISINOPRIL 20 MG/1
TABLET ORAL
Qty: 60 TABLET | Refills: 4 | Status: SHIPPED | OUTPATIENT
Start: 2023-01-04

## 2023-01-04 RX ORDER — LOSARTAN POTASSIUM 100 MG/1
TABLET ORAL
Qty: 30 TABLET | Refills: 4 | Status: SHIPPED | OUTPATIENT
Start: 2023-01-04

## 2023-01-06 ENCOUNTER — RA CDI HCC (OUTPATIENT)
Dept: OTHER | Facility: HOSPITAL | Age: 78
End: 2023-01-06

## 2023-01-06 NOTE — PROGRESS NOTES
[facility]  coding opportunities          Chart Reviewed number of suggestions sent to Provider: 1     Patients Insurance     Medicare Insurance: Medicare        [id number]

## 2023-01-10 ENCOUNTER — APPOINTMENT (OUTPATIENT)
Dept: LAB | Facility: HOSPITAL | Age: 78
End: 2023-01-10
Attending: INTERNAL MEDICINE

## 2023-01-10 DIAGNOSIS — R73.9 HYPERGLYCEMIA: ICD-10-CM

## 2023-01-10 DIAGNOSIS — I10 ESSENTIAL (PRIMARY) HYPERTENSION: ICD-10-CM

## 2023-01-10 LAB
ALBUMIN SERPL BCP-MCNC: 3.7 G/DL (ref 3.5–5)
ALP SERPL-CCNC: 62 U/L (ref 46–116)
ALT SERPL W P-5'-P-CCNC: 46 U/L (ref 12–78)
ANION GAP SERPL CALCULATED.3IONS-SCNC: 10 MMOL/L (ref 4–13)
AST SERPL W P-5'-P-CCNC: 39 U/L (ref 5–45)
BILIRUB SERPL-MCNC: 1.46 MG/DL (ref 0.2–1)
BUN SERPL-MCNC: 12 MG/DL (ref 5–25)
CALCIUM SERPL-MCNC: 8.8 MG/DL (ref 8.3–10.1)
CHLORIDE SERPL-SCNC: 101 MMOL/L (ref 96–108)
CO2 SERPL-SCNC: 29 MMOL/L (ref 21–32)
CREAT SERPL-MCNC: 0.99 MG/DL (ref 0.6–1.3)
EST. AVERAGE GLUCOSE BLD GHB EST-MCNC: 137 MG/DL
GFR SERPL CREATININE-BSD FRML MDRD: 74 ML/MIN/1.73SQ M
GLUCOSE P FAST SERPL-MCNC: 117 MG/DL (ref 65–99)
HBA1C MFR BLD: 6.4 %
LDLC SERPL DIRECT ASSAY-MCNC: 48 MG/DL (ref 0–100)
POTASSIUM SERPL-SCNC: 3.5 MMOL/L (ref 3.5–5.3)
PROT SERPL-MCNC: 7.8 G/DL (ref 6.4–8.4)
SODIUM SERPL-SCNC: 140 MMOL/L (ref 135–147)

## 2023-01-13 ENCOUNTER — OFFICE VISIT (OUTPATIENT)
Dept: FAMILY MEDICINE CLINIC | Facility: CLINIC | Age: 78
End: 2023-01-13

## 2023-01-13 VITALS
TEMPERATURE: 97.5 F | DIASTOLIC BLOOD PRESSURE: 78 MMHG | WEIGHT: 252 LBS | HEIGHT: 69 IN | SYSTOLIC BLOOD PRESSURE: 134 MMHG | BODY MASS INDEX: 37.33 KG/M2 | RESPIRATION RATE: 18 BRPM | HEART RATE: 76 BPM

## 2023-01-13 DIAGNOSIS — I50.32 CHRONIC DIASTOLIC CHF (CONGESTIVE HEART FAILURE) (HCC): ICD-10-CM

## 2023-01-13 DIAGNOSIS — G47.33 OBSTRUCTIVE SLEEP APNEA OF ADULT: ICD-10-CM

## 2023-01-13 DIAGNOSIS — I48.0 PAROXYSMAL ATRIAL FIBRILLATION (HCC): ICD-10-CM

## 2023-01-13 DIAGNOSIS — I15.0 RENOVASCULAR HYPERTENSION: Primary | ICD-10-CM

## 2023-01-13 NOTE — PROGRESS NOTES
Name: Deo Cuenca      : 1945      MRN: [id number]  Encounter Provider: Claude Garcia DO  Encounter Date: 2023   Encounter department: [facility] [address]     1  Renovascular hypertension  Lo sodium diet Decrease carb intake  Stay hydrated  Pt has cardiology appt next week ? Stress test with sob and leg sxs reported Reviewed echo on chart from     2  Obstructive sleep apnea of adult  Pt does not use cpap He feels he sleeps well and reports no sxs     3  Paroxysmal atrial fibrillation [facility])  Has cardiology followup next week    4  Chronic diastolic CHF (congestive heart failure) ([facility])  Pt reports some gibbons ? Cardiac related He has appt next week ? Stress test warranted       BMI Counseling: Body mass index is 37 21 kg/m²  The BMI is above normal  Nutrition recommendations include consuming healthier snacks and increasing intake of lean protein  Exercise recommendations include exercising 3-5 times per week  Rationale for BMI follow-up plan is due to patient being overweight or obese  Depression Screening and Follow-up Plan: Patient was screened for depression during today's encounter  They screened negative with a PHQ-2 score of 0  Rto 6months  Subjective      HPI   Pt doing ok He does have some gibbons No chest pain and also leg pain usually when active No falls No numbness/tingling No cough or uri sxs Sleep ok and not using cpap No limiting back pain and was active during recent hunting season Has cardio appt next week   Review of Systems   Constitutional: Positive for activity change  Negative for chills and fever  HENT: Negative  Eyes: Negative for visual disturbance  Respiratory: Positive for shortness of breath  Negative for cough  Cardiovascular: Negative for chest pain, palpitations and leg swelling  Gastrointestinal: Negative for abdominal distention and abdominal pain  Genitourinary: Negative  Musculoskeletal: Positive for arthralgias  Leg cramps with walking usually   Neurological: Negative for dizziness, light-headedness and headaches  Psychiatric/Behavioral: Negative for sleep disturbance  The patient is not nervous/anxious  Current Outpatient Medications on File Prior to Visit   Medication Sig   • Eliquis 5 MG TAKE ONE TABLET BY MOUTH TWICE A DAY  • ezetimibe (ZETIA) 10 mg tablet Take 1 tablet (10 mg total) by mouth daily   • folic acid (FOLVITE) 1 mg tablet TAKE (1) TABLET BY MOUTH DAILY  • furosemide (LASIX) 40 mg tablet TAKE ONE TABLET BY MOUTH TWICE A DAY  • lisinopril (ZESTRIL) 20 mg tablet TAKE (1) TABLET BY MOUTH TWICE DAILY  • losartan (COZAAR) 100 MG tablet TAKE (1) TABLET BY MOUTH DAILY  • metoprolol tartrate (LOPRESSOR) 50 mg tablet Take 1 tablet (50 mg total) by mouth every 12 (twelve) hours   • NIFEdipine (PROCARDIA XL) 90 mg 24 hr tablet TAKE 1 TABLET DAILY  • omeprazole (PriLOSEC) 20 mg delayed release capsule TAKE (1) CAPSULE DAILY  • predniSONE 5 mg tablet TAKE ONE TABLET DAILY  • rosuvastatin (CRESTOR) 20 MG tablet TAKE ONE TABLET BY MOUTH DAILY  • triamcinolone (KENALOG) 0 1 % cream Apply topically 2 (two) times a day   • [DISCONTINUED] colesevelam (WELCHOL) 625 mg tablet Take 1 tablet (625 mg total) by mouth daily       Objective     /78   Pulse 76   Temp 97 5 °F (36 4 °C) (Temporal)   Resp 18   Ht 5' 9" (1 753 m)   Wt 114 kg (252 lb)   BMI 37 21 kg/m²     Physical Exam  Vitals reviewed  Constitutional:       General: He is not in acute distress  Appearance: Normal appearance  He is not ill-appearing, toxic-appearing or diaphoretic  HENT:      Head: Normocephalic and atraumatic  Right Ear: External ear normal       Left Ear: External ear normal       Nose: Nose normal       Mouth/Throat:      Mouth: Mucous membranes are dry  Eyes:      General: No scleral icterus  Extraocular Movements: Extraocular movements intact        Conjunctiva/sclera: Conjunctivae normal       Pupils: Pupils are equal, round, and reactive to light  Cardiovascular:      Rate and Rhythm: Normal rate and regular rhythm  Pulses: Normal pulses  Heart sounds: Normal heart sounds  No murmur heard  Pulmonary:      Effort: Pulmonary effort is normal  No respiratory distress  Breath sounds: Normal breath sounds  No wheezing  Abdominal:      General: Bowel sounds are normal  There is no distension  Palpations: Abdomen is soft  Tenderness: There is no abdominal tenderness  Musculoskeletal:      Cervical back: Normal range of motion and neck supple  No rigidity  Right lower leg: No edema  Left lower leg: No edema  Lymphadenopathy:      Cervical: No cervical adenopathy  Skin:     General: Skin is dry  Coloration: Skin is not jaundiced or pale  Neurological:      General: No focal deficit present  Mental Status: He is alert and oriented to person, place, and time  Mental status is at baseline  Cranial Nerves: No cranial nerve deficit  Psychiatric:         Mood and Affect: Mood normal          Behavior: Behavior normal          Thought Content:  Thought content normal          Judgment: Judgment normal      no calf tenderness or swelling   No warmth or redness Pulses palpable no discoloration   Mike Fray, DO

## 2023-01-13 NOTE — PROGRESS NOTES
Cardiology Follow Up    [facility]   1945  [id number]  [address] [facility] [address] [facility]  [place] [address]  [phone]    1  Persistent atrial fibrillation (HCC)  POCT ECG      2  Nonocclusive coronary atherosclerosis of native coronary artery  NM myocardial perfusion spect (rx stress and/or rest)      3  Chronic diastolic (congestive) heart failure ([address] )        4  Essential hypertension        5  Dyslipidemia        6  Obstructive sleep apnea        7  Dyspnea on exertion  Echo complete w/ contrast if indicated    NM myocardial perfusion spect (rx stress and/or rest)      8   Nonrheumatic aortic valve stenosis  Echo complete w/ contrast if indicated          Discussion/Summary:  Persistent atrial fibrillation:   Initially was paroxysmal, has been persistent for the past 2 years or so  Rate was elevated initially on EKG, but upon auscultation rate was in the 80's, pulse has also been between 70-90 bpm when checked by patient at home  Continue rate control strategy with metoprolol 50 mg BID  Continue anticoagulation with Eliquis 5 mg BID  He has severe obstructive sleep apnea and is unable to tolerate a CPAP    Dyspnea on exertion:  I will check a pharmacologic nuclear stress test to rule out ischemia given known nonobstructive coronary artery disease on prior left heart catheterization  I will also repeat an echocardiogram given known moderate aortic stenosis with last echo being 1 year ago     Aortic stenosis:   Stable in the moderate range on echocardiogram from January 2022  Repeat echo as mentioned above      Chronic diastolic congestive heart failure:   Besides mild lower extremity edema which he reports is stable, appears compensated on exam  Continue lasix at present dose  He will continue daily weights  A low sodium diet was reinforced      Hypertension:   Controlled on present regimen     Dyslipidemia:   Lipids are at goal on current statin regimen  He will return to the office in 6 months with Dr Jordi Draper or sooner if necessary  He will call with any concerns  Interval History:   Xenia Duggan is a 78 y o  male with persistent atrial fibrillation on anticoagulation, nonobstructive CAD, chronic diastolic congestive heart failure, moderate aortic stenosis, hypertension, dyslipidemia, obesity who presents to the office today for routine follow up  Since his last office visit he has noticed increased dyspnea on exertion  He noticed this the most when he went hunting around Thanksgiving  He did not feel this way when hunting the year prior  He denies any exertional chest pain/pressure/discomfort  He reports his lower extremity edema has been stable  His weight has been stable at about 252 pounds on his home scale  He denies lightness, dizziness, and syncope  He denies palpitations  He denies orthopnea and PND  He tries to follow a low-sodium diet    He does monitor his heart rate at home and states it is typically between 70 and 90 bpm     50% mid LAD, 40% OM1 - C 2007    Medical Problems     Problem List     Aortic stenosis, mild    Chronic diastolic CHF (congestive heart failure) ([facility] )    Wt Readings from Last 3 Encounters:   01/16/23 114 kg (251 lb 9 6 oz)   01/13/23 114 kg (252 lb)   09/13/22 114 kg (251 lb 3 2 oz)                 Nonocclusive coronary atherosclerosis of native coronary artery    Dyslipidemia    Obstructive sleep apnea of adult    Paroxysmal atrial fibrillation (HCC)    Paroxysmal atrial tachycardia (HCC)    Allergic rhinitis due to pollen    Erectile dysfunction    GERD without esophagitis    Class 2 severe obesity due to excess calories with serious comorbidity and body mass index (BMI) of 37 0 to 37 9 in adult [facility])    Renovascular hypertension    Rheumatoid arthritis ([facility] )    History of colon polyps    Mixed hyperlipidemia    Hypertensive heart disease with congestive heart failure ([facility] )    Overview Signed 10/29/2021  6:28 AM by Jaydon Arenas     Added per ICD-10 guidelines; provider accepted         Wt Readings from Last 3 Encounters:   01/16/23 114 kg (251 lb 9 6 oz)   01/13/23 114 kg (252 lb)   09/13/22 114 kg (251 lb 3 2 oz)                     Past Medical History:   Diagnosis Date   • Atrial fibrillation (HCC)    • Colon polyp    • Hyperlipidemia    • Hypertension    • Nonocclusive coronary atherosclerosis of native coronary artery 03/26/2015     Social History     Socioeconomic History   • Marital status: /Civil Union     Spouse name: Not on file   • Number of children: Not on file   • Years of education: Not on file   • Highest education level: Not on file   Occupational History   • Not on file   Tobacco Use   • Smoking status: Former   • Smokeless tobacco: Former   Vaping Use   • Vaping Use: Never used   Substance and Sexual Activity   • Alcohol use: Yes     Alcohol/week: 4 0 standard drinks     Types: 4 Cans of beer per week     Comment: daily   • Drug use: No   • Sexual activity: Not on file   Other Topics Concern   • Not on file   Social History Narrative    Dental care, regularly    Good dental hygiene    No advance directives    No caffeine use    Uses safety equipment - Seatbelts      Social Determinants of Health     Financial Resource Strain: Low Risk    • Difficulty of Paying Living Expenses: Not very hard   Food Insecurity: Not on file   Transportation Needs: No Transportation Needs   • Lack of Transportation (Medical): No   • Lack of Transportation (Non-Medical):  No   Physical Activity: Not on file   Stress: Not on file   Social Connections: Not on file   Intimate Partner Violence: Not on file   Housing Stability: Not on file      Family History   Problem Relation Age of Onset   • Diabetes Mother    • Stroke Mother      Past Surgical History:   Procedure Laterality Date   • APPENDECTOMY     • BACK SURGERY  1996    disc repair • CARDIAC CATHETERIZATION      50% lesion of mid LAD, 40% lesion of first obtuse marginal lesion  Last assessed 1/17/2018    • CHOLECYSTECTOMY     • COLON SURGERY      colon ressection   • COLONOSCOPY N/A 1/14/2019    Procedure: COLONOSCOPY;  Surgeon: Frank Malin MD;  Location: [facility];  Service: Colorectal   • CYSTOSCOPY      Diagnostic    • HEMORRHOID SURGERY  08/2011   • TONSILLECTOMY         Current Outpatient Medications:   •  Eliquis 5 MG, TAKE ONE TABLET BY MOUTH TWICE A DAY , Disp: 60 tablet, Rfl: 6  •  ezetimibe (ZETIA) 10 mg tablet, Take 1 tablet (10 mg total) by mouth daily, Disp: 30 tablet, Rfl: 5  •  folic acid (FOLVITE) 1 mg tablet, TAKE (1) TABLET BY MOUTH DAILY  , Disp: 30 tablet, Rfl: 4  •  furosemide (LASIX) 40 mg tablet, TAKE ONE TABLET BY MOUTH TWICE A DAY , Disp: 60 tablet, Rfl: 4  •  lisinopril (ZESTRIL) 20 mg tablet, TAKE (1) TABLET BY MOUTH TWICE DAILY  , Disp: 60 tablet, Rfl: 4  •  losartan (COZAAR) 100 MG tablet, TAKE (1) TABLET BY MOUTH DAILY  , Disp: 30 tablet, Rfl: 4  •  metoprolol tartrate (LOPRESSOR) 50 mg tablet, Take 1 tablet (50 mg total) by mouth every 12 (twelve) hours, Disp: 60 tablet, Rfl: 4  •  NIFEdipine (PROCARDIA XL) 90 mg 24 hr tablet, TAKE 1 TABLET DAILY  , Disp: 30 tablet, Rfl: 4  •  omeprazole (PriLOSEC) 20 mg delayed release capsule, TAKE (1) CAPSULE DAILY  , Disp: 30 capsule, Rfl: 4  •  predniSONE 5 mg tablet, TAKE ONE TABLET DAILY  , Disp: 30 tablet, Rfl: 4  •  rosuvastatin (CRESTOR) 20 MG tablet, TAKE ONE TABLET BY MOUTH DAILY  , Disp: 30 tablet, Rfl: 4  •  triamcinolone (KENALOG) 0 1 % cream, Apply topically 2 (two) times a day, Disp: 30 g, Rfl: 0  No Known Allergies    Labs:     Chemistry        Component Value Date/Time     12/31/2015 0705    K 3 5 01/10/2023 0825    K 4 3 12/31/2015 0705     01/10/2023 0825     12/31/2015 0705    CO2 29 01/10/2023 0825    CO2 27 9 12/31/2015 0705    BUN 12 01/10/2023 0825    BUN 14 12/31/2015 0705 CREATININE 0 99 01/10/2023 0825    CREATININE 0 83 12/31/2015 0705        Component Value Date/Time    CALCIUM 8 8 01/10/2023 0825    CALCIUM 8 6 12/31/2015 0705    ALKPHOS 62 01/10/2023 0825    ALKPHOS 51 12/31/2015 0705    AST 39 01/10/2023 0825    AST 29 12/31/2015 0705    ALT 46 01/10/2023 0825    ALT 48 12/31/2015 0705    BILITOT 1 07 (H) 12/31/2015 0705            Lab Results   Component Value Date    CHOL 213 12/31/2015    CHOL 226 03/12/2015     Lab Results   Component Value Date    HDL 98 05/12/2022    HDL 82 07/01/2020    HDL 79 (H) 08/13/2019     Lab Results   Component Value Date    LDLCALC 39 05/12/2022    LDLCALC 54 07/01/2020    LDLCALC 42 08/13/2019     Lab Results   Component Value Date    TRIG 59 05/12/2022    TRIG 102 07/01/2020    TRIG 54 08/13/2019     No results found for: CHOLHDL    Imaging: No results found  ECG: atrial fibrillation with RVR with PVC's  T wave abnormality in inferior leads  Prolonged QT    Review of Systems   Constitutional: Positive for malaise/fatigue  Cardiovascular: Positive for dyspnea on exertion  All other systems reviewed and are negative  Vitals:    01/16/23 1343   BP: 130/70   Pulse: (!) 107     Vitals:    01/16/23 1343   Weight: 114 kg (251 lb 9 6 oz)     Height: 5' 9" (175 3 cm)   Body mass index is 37 15 kg/m²  Physical Exam:  Physical Exam  Vitals reviewed  Constitutional:       General: He is not in acute distress  Appearance: He is well-developed  He is not diaphoretic  HENT:      Head: Normocephalic and atraumatic  Eyes:      Pupils: Pupils are equal, round, and reactive to light  Neck:      Vascular: No carotid bruit  Cardiovascular:      Rate and Rhythm: Normal rate and regular rhythm  Pulses:           Radial pulses are 2+ on the right side and 2+ on the left side  Heart sounds: S1 normal and S2 normal  No murmur heard  Pulmonary:      Effort: Pulmonary effort is normal  No respiratory distress        Breath sounds: Normal breath sounds  No wheezing or rales  Abdominal:      General: There is no distension  Palpations: Abdomen is soft  Tenderness: There is no abdominal tenderness  Musculoskeletal:         General: Normal range of motion  Cervical back: Normal range of motion  Right lower leg: Edema (+1 bilateral lower extremity edema) present  Left lower leg: Edema present  Skin:     General: Skin is warm and dry  Findings: No erythema  Neurological:      General: No focal deficit present  Mental Status: He is alert and oriented to person, place, and time     Psychiatric:         Mood and Affect: Mood normal          Behavior: Behavior normal

## 2023-01-16 ENCOUNTER — OFFICE VISIT (OUTPATIENT)
Dept: CARDIOLOGY CLINIC | Facility: HOSPITAL | Age: 78
End: 2023-01-16

## 2023-01-16 VITALS
DIASTOLIC BLOOD PRESSURE: 70 MMHG | BODY MASS INDEX: 37.26 KG/M2 | WEIGHT: 251.6 LBS | SYSTOLIC BLOOD PRESSURE: 130 MMHG | HEART RATE: 107 BPM | HEIGHT: 69 IN

## 2023-01-16 DIAGNOSIS — I10 ESSENTIAL HYPERTENSION: ICD-10-CM

## 2023-01-16 DIAGNOSIS — I48.19 PERSISTENT ATRIAL FIBRILLATION (HCC): Primary | ICD-10-CM

## 2023-01-16 DIAGNOSIS — R06.09 DYSPNEA ON EXERTION: ICD-10-CM

## 2023-01-16 DIAGNOSIS — I50.32 CHRONIC DIASTOLIC (CONGESTIVE) HEART FAILURE (HCC): ICD-10-CM

## 2023-01-16 DIAGNOSIS — E78.5 DYSLIPIDEMIA: ICD-10-CM

## 2023-01-16 DIAGNOSIS — I25.10 NONOCCLUSIVE CORONARY ATHEROSCLEROSIS OF NATIVE CORONARY ARTERY: ICD-10-CM

## 2023-01-16 DIAGNOSIS — G47.33 OBSTRUCTIVE SLEEP APNEA: ICD-10-CM

## 2023-01-16 DIAGNOSIS — I35.0 NONRHEUMATIC AORTIC VALVE STENOSIS: ICD-10-CM

## 2023-02-03 DIAGNOSIS — I48.0 PAF (PAROXYSMAL ATRIAL FIBRILLATION) (HCC): ICD-10-CM

## 2023-02-03 RX ORDER — APIXABAN 5 MG/1
TABLET, FILM COATED ORAL
Qty: 60 TABLET | Refills: 4 | Status: SHIPPED | OUTPATIENT
Start: 2023-02-03

## 2023-02-09 ENCOUNTER — HOSPITAL ENCOUNTER (OUTPATIENT)
Dept: NUCLEAR MEDICINE | Facility: HOSPITAL | Age: 78
Discharge: HOME | End: 2023-02-09

## 2023-02-09 ENCOUNTER — HOSPITAL ENCOUNTER (OUTPATIENT)
Dept: NON INVASIVE DIAGNOSTICS | Facility: HOSPITAL | Age: 78
Discharge: HOME | End: 2023-02-09

## 2023-02-09 VITALS
DIASTOLIC BLOOD PRESSURE: 70 MMHG | WEIGHT: 251 LBS | HEART RATE: 80 BPM | SYSTOLIC BLOOD PRESSURE: 120 MMHG | BODY MASS INDEX: 37.18 KG/M2 | HEIGHT: 69 IN

## 2023-02-09 DIAGNOSIS — R06.09 DYSPNEA ON EXERTION: ICD-10-CM

## 2023-02-09 DIAGNOSIS — I25.10 NONOCCLUSIVE CORONARY ATHEROSCLEROSIS OF NATIVE CORONARY ARTERY: ICD-10-CM

## 2023-02-09 DIAGNOSIS — I35.0 NONRHEUMATIC AORTIC VALVE STENOSIS: ICD-10-CM

## 2023-02-09 LAB
AORTIC ROOT: 3.2 CM
AORTIC VALVE MEAN VELOCITY: 23.7 M/S
AV AREA BY CONTINUOUS VTI: 0.9 CM2
AV AREA PEAK VELOCITY: 0.9 CM2
AV LVOT MEAN GRADIENT: 2 MMHG
AV LVOT PEAK GRADIENT: 3 MMHG
AV MEAN GRADIENT: 26 MMHG
AV PEAK GRADIENT: 41 MMHG
AV VALVE AREA: 0.86 CM2
AV VELOCITY RATIO: 0.27
DOP CALC AO PEAK VEL: 3.21 M/S
DOP CALC AO VTI: 75.16 CM
DOP CALC LVOT AREA: 3.14 CM2
DOP CALC LVOT DIAMETER: 2 CM
DOP CALC LVOT PEAK VEL VTI: 20.58 CM
DOP CALC LVOT PEAK VEL: 0.88 M/S
DOP CALC LVOT STROKE INDEX: 29.8 ML/M2
DOP CALC LVOT STROKE VOLUME: 64.62 CM3
FRACTIONAL SHORTENING: 39 % (ref 28–44)
INTERVENTRICULAR SEPTUM IN DIASTOLE (PARASTERNAL SHORT AXIS VIEW): 1.5 CM
INTERVENTRICULAR SEPTUM: 1.5 CM (ref 0.6–1.1)
LEFT ATRIUM SIZE: 5.3 CM
LEFT INTERNAL DIMENSION IN SYSTOLE: 3.1 CM (ref 2.1–4)
LEFT VENTRICULAR INTERNAL DIMENSION IN DIASTOLE: 5.1 CM (ref 3.5–6)
LEFT VENTRICULAR POSTERIOR WALL IN END DIASTOLE: 1.5 CM
LEFT VENTRICULAR STROKE VOLUME: 87 ML
LVSV (TEICH): 87 ML
NUC STRESS EJECTION FRACTION: 70 %
RA PRESSURE ESTIMATED: 8 MMHG
RV PSP: 47 MMHG
SL CV LV EF: 65
SL CV PED ECHO LEFT VENTRICLE DIASTOLIC VOLUME (MOD BIPLANE) 2D: 124 ML
SL CV PED ECHO LEFT VENTRICLE SYSTOLIC VOLUME (MOD BIPLANE) 2D: 37 ML
SL CV REST NUCLEAR ISOTOPE DOSE: 16.2 MCI
SL CV STRESS NUCLEAR ISOTOPE DOSE: 48.3 MCI
STRESS ST DEPRESSION: 0 MM
STRESS/REST PERFUSION RATIO: 0.89
TR MAX PG: 39 MMHG
TR PEAK VELOCITY: 3.1 M/S
TRICUSPID VALVE PEAK REGURGITATION VELOCITY: 3.12 M/S

## 2023-02-09 RX ADMIN — REGADENOSON 0.4 MG: 0.08 INJECTION, SOLUTION INTRAVENOUS at 08:59

## 2023-02-09 RX ADMIN — PERFLUTREN 1 ML/MIN: 6.52 INJECTION, SUSPENSION INTRAVENOUS at 10:00

## 2023-02-10 LAB
CHEST PAIN STATEMENT: NORMAL
MAX DIASTOLIC BP: 74 MMHG
MAX HEART RATE: 111 BPM
MAX PREDICTED HEART RATE: 145 BPM
MAX. SYSTOLIC BP: 128 MMHG
PROTOCOL NAME: NORMAL
REASON FOR TERMINATION: NORMAL
TARGET HR FORMULA: NORMAL
TEST INDICATION: NORMAL
TIME IN EXERCISE PHASE: NORMAL

## 2023-02-14 DIAGNOSIS — I35.0 SEVERE AORTIC STENOSIS: Primary | ICD-10-CM

## 2023-02-14 NOTE — RESULT ENCOUNTER NOTE
Called patient regarding echo results  Echo shows AS has progressed into the severe range  Recommend CT surgery consult  Patient is agreeable to this  Discussed types of valve replacement as well as preoperative testing  Answered all questions

## 2023-02-24 ENCOUNTER — PREP FOR PROCEDURE (OUTPATIENT)
Dept: CARDIOLOGY CLINIC | Facility: CLINIC | Age: 78
End: 2023-02-24

## 2023-02-24 ENCOUNTER — OFFICE VISIT (OUTPATIENT)
Dept: CARDIAC SURGERY | Facility: CLINIC | Age: 78
End: 2023-02-24

## 2023-02-24 ENCOUNTER — TELEPHONE (OUTPATIENT)
Dept: CARDIOLOGY CLINIC | Facility: CLINIC | Age: 78
End: 2023-02-24

## 2023-02-24 VITALS
OXYGEN SATURATION: 98 % | DIASTOLIC BLOOD PRESSURE: 73 MMHG | TEMPERATURE: 98.2 F | SYSTOLIC BLOOD PRESSURE: 129 MMHG | HEIGHT: 69 IN | HEART RATE: 104 BPM | BODY MASS INDEX: 36.58 KG/M2 | WEIGHT: 247 LBS

## 2023-02-24 DIAGNOSIS — I35.0 SEVERE AORTIC STENOSIS: Primary | ICD-10-CM

## 2023-02-24 DIAGNOSIS — Z13.6 ENCOUNTER FOR SCREENING FOR STENOSIS OF CAROTID ARTERY: ICD-10-CM

## 2023-02-24 NOTE — PROGRESS NOTES
Consultation - Cardiothoracic Surgery   Olivier Sotelo  78 y o  male MRN: [id number]    Physician Requesting Consult: Dr Cody Jerry    Reason for Consult / Principal Problem: Aortic stenosis, Non-Rheumatic    History of Present Illness: Olivier Sotelo  is a 78y o  year old male who presents for initial outpatient surgical consultation for symptomatic severe aortic stenosis  Patient's PMHx is notable for AS, CAD (non-obst), Afib (Eliquis), HTN, HLD, MARTINA (no CPAP), CHF, pre-diabetes (Ac1 6 4%), morbid obesity (BMI 36 5), GERD, RA, h/o diverticulitis s/p colectomy and colon polyps  Patient with known h/o AS, identified on echo in 2020  He has been under surveillance by his cardiologist  Most recent echo demonstrates progression of his AS to severe range  Patient accompanied by his wife today  He reports worsening GONZALEZ, for example, walking a distance of ~ 100 yds on his driveway to the mailbox with an incline, he needs to stop and rest to catch his breath  He reports a decrease in activity tolerance and an increase in his chronic LE edema  He denies chest pain, lightheadedness, palpitations, PND or orthopnea  He denies h/o heart murmur or rheumatic fever ion childhood  He does not use CPAP at night  Patient denies tobacco, admits to a few beer per week and denies drug use  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)    • Colon polyp    • Hyperlipidemia    • Hypertension    • Nonocclusive coronary atherosclerosis of native coronary artery 03/26/2015         Past Surgical History:   Past Surgical History:   Procedure Laterality Date   • APPENDECTOMY     • BACK SURGERY  1996    disc repair   • CARDIAC CATHETERIZATION      50% lesion of mid LAD, 40% lesion of first obtuse marginal lesion   Last assessed 1/17/2018    • CHOLECYSTECTOMY • COLON SURGERY      colon ressection   • COLONOSCOPY N/A 01/14/2019    Procedure: COLONOSCOPY;  Surgeon: Dayne Naidu MD;  Location: [facility];  Service: Colorectal   • COLONOSCOPY  08/08/2022   • CYSTOSCOPY      Diagnostic    • HEMORRHOID SURGERY  08/2011   • TONSILLECTOMY           Family History:  Family History   Problem Relation Age of Onset   • Diabetes Mother    • Stroke Mother          Social History:    Social History     Substance and Sexual Activity   Alcohol Use Yes   • Alcohol/week: 4 0 standard drinks   • Types: 4 Cans of beer per week    Comment: daily     Social History     Substance and Sexual Activity   Drug Use No     Social History     Tobacco Use   Smoking Status Former   Smokeless Tobacco Former         Home Medications:   Prior to Admission medications    Medication Sig Start Date End Date Taking? Authorizing Provider   Eliquis 5 MG TAKE ONE TABLET BY MOUTH TWICE A DAY  2/3/23   Tigist Butlre PA-C   ezetimibe (ZETIA) 10 mg tablet Take 1 tablet (10 mg total) by mouth daily 11/25/22   Dellar Fabry, DO   folic acid (FOLVITE) 1 mg tablet TAKE (1) TABLET BY MOUTH DAILY  12/2/22   Dellar Fabry, DO   furosemide (LASIX) 40 mg tablet TAKE ONE TABLET BY MOUTH TWICE A DAY  12/2/22   Kasi Martinez DO   lisinopril (ZESTRIL) 20 mg tablet TAKE (1) TABLET BY MOUTH TWICE DAILY  1/4/23   Dellar Fabry, DO   losartan (COZAAR) 100 MG tablet TAKE (1) TABLET BY MOUTH DAILY  1/4/23   Dellar Fabry, DO   metoprolol tartrate (LOPRESSOR) 50 mg tablet Take 1 tablet (50 mg total) by mouth every 12 (twelve) hours 12/2/22   Tigist Butler PA-C   NIFEdipine (PROCARDIA XL) 90 mg 24 hr tablet TAKE 1 TABLET DAILY  12/2/22   Kasi Martinez DO   omeprazole (PriLOSEC) 20 mg delayed release capsule TAKE (1) CAPSULE DAILY  12/2/22   Dellar Fabry, DO   predniSONE 5 mg tablet TAKE ONE TABLET DAILY  12/2/22   Dellar Fabry, DO   rosuvastatin (CRESTOR) 20 MG tablet TAKE ONE TABLET BY MOUTH DAILY  12/2/22   Isabella Ward DO   triamcinolone (KENALOG) 0 1 % cream Apply topically 2 (two) times a day 8/16/22   Rossy Costa DO       Allergies:  No Known Allergies    Review of Systems:  Review of Systems - History obtained from chart review and the patient  General ROS: positive for  - fatigue and change in activity tolerance   negative for - chills, fever, sleep disturbance or weight gain  Psychological ROS: negative  Ophthalmic ROS: negative  ENT ROS: negative  Allergy and Immunology ROS: negative  Hematological and Lymphatic ROS: negative  Endocrine ROS: negative  Breast ROS: negative  Respiratory ROS: no cough, shortness of breath, or wheezing  Cardiovascular ROS: positive for - murmur, dyspnea on exertion, edema and irregular heartbeat  negative for - chest pain, loss of consciousness, orthopnea, palpitations, paroxysmal nocturnal dyspnea or rapid heart rate  Gastrointestinal ROS: no abdominal pain, change in bowel habits, or black or bloody stools  Genito-Urinary ROS: no dysuria, trouble voiding, or hematuria  Musculoskeletal ROS: positive for - back pain; arthralgias  Neurological ROS: no TIA or stroke symptoms  Dermatological ROS: negative    Vital Signs:     Vitals:    02/24/23 1014 02/24/23 1023   BP: 128/60 129/73   BP Location: Right arm Left arm   Patient Position: Sitting Sitting   Cuff Size: Large Large   Pulse: 104    Temp: 98 2 °F (36 8 °C)    TempSrc: Tympanic    SpO2: 98%    Weight: 112 kg (247 lb)    Height: 5' 9" (1 753 m)        Physical Exam:    General:  Alert, oriented, well developed, no acute distress  HEENT/NECK:  PERRLA  No jugular venous distention  Cardiac:Irregular rhythm, II-III/VI harsh systolic murmur RUSB   Carotid arteries: 1+ pulses, no bruits  Pulmonary:  Breath sounds clear bilaterally  Abdomen:  Non-tender, Non-distended  Positive bowel sounds  Upper extremities: 2+ radial pulses; brisk capillary refill  Lower extremities: Extremities warm/dry     PT/DP pulses 1+ bilaterally  3+ edema B/L  Neuro: Alert and oriented X 3  Sensation is grossly intact  No focal deficits  Musculoskeletal: MAEE, stable gait  Skin: Warm/Dry, without rashes or lesions  Lab Results:   Lab Results   Component Value Date     12/31/2015    SODIUM 140 01/10/2023    K 3 5 01/10/2023     01/10/2023    CO2 29 01/10/2023    ANIONGAP 10 12/31/2015    AGAP 10 01/10/2023    BUN 12 01/10/2023    CREATININE 0 99 01/10/2023    GLUC 121 04/20/2021    GLUF 117 (H) 01/10/2023    CALCIUM 8 8 01/10/2023    AST 39 01/10/2023    ALT 46 01/10/2023    ALKPHOS 62 01/10/2023    PROT 7 4 12/31/2015    TP 7 8 01/10/2023    BILITOT 1 07 (H) 12/31/2015    TBILI 1 46 (H) 01/10/2023    EGFR 74 01/10/2023         Lab Results   Component Value Date    HGBA1C 6 4 (H) 01/10/2023     Lab Results   Component Value Date    CKTOTAL 145 06/20/2019    CKMB 1 4 05/24/2018    CKMBINDEX <1 0 05/24/2018    TROPONINI <0 04 03/12/2015       Imaging Studies:     Echocardiogram: 2/9/23       •  Left Ventricle: Left ventricular cavity size is normal  Wall thickness is increased  There is mild to moderate concentric hypertrophy  The left ventricular ejection fraction is 65%  Systolic function is normal  Wall motion is normal   •  Left Atrium: The atrium is moderately dilated  •  Right Atrium: The atrium is moderately dilated  •  Aortic Valve: The aortic valve is trileaflet  The leaflets are not thickened  The leaflets are moderately calcified  There is severely reduced mobility  There is severe stenosis  Peak gradient is 41 mmHg, mean gradient is 26 mmHg  TY 0 9 cm2   •  Mitral Valve: There is mild regurgitation  •  Tricuspid Valve: There is mild regurgitation  •  Pulmonic Valve: There is mild regurgitation      Findings    Left Ventricle Left ventricular cavity size is normal  Wall thickness is increased  There is mild to moderate concentric hypertrophy  The left ventricular ejection fraction is 65%   Systolic function is normal   Wall motion is normal    Right Ventricle Right ventricular cavity size is normal  Systolic function is normal  Wall thickness is normal    Left Atrium The atrium is moderately dilated  Right Atrium The atrium is moderately dilated  Aortic Valve The aortic valve is trileaflet  The leaflets are not thickened  The leaflets are moderately calcified  There is severely reduced mobility  There is no evidence of regurgitation  There is severe stenosis  Peak gradient is 41 mmHg, mean gradient is 26 mmHg  TY 0 9 cm2  Mitral Valve Mitral valve structure is normal  There is mild regurgitation  There is no evidence of stenosis  Tricuspid Valve Tricuspid valve structure is normal  There is mild regurgitation  There is no evidence of stenosis  The estimated right ventricular systolic pressure is 82 92 mmHg  Pulmonic Valve Pulmonic valve structure is normal  There is mild regurgitation  There is no evidence of stenosis  Ascending Aorta The aortic root is normal in size  IVC/SVC The right atrial pressure is estimated at 8 0 mmHg  The inferior vena cava is normal in size  Pericardium There is no pericardial effusion  The pericardium is normal in appearance       Left Ventricle Measurements    Function/Volumes   LVOT stroke volume 64 62 cm3         LVOT stroke volume index 29 8 ml/m2         Dimensions   LVIDd 5 1 cm         LVIDS 3 1 cm         IVSd 1 5 cm         LVPWd 1 5 cm         LVOT area 3 14 cm2         FS 39 %          Report Measurements   AV LVOT peak gradient 3 mmHg              Interventricular Septum Measurements    Shunt Ratio   LVOT peak VTI 20 58 cm         LVOT peak reji 0 88 m/s              Left Atrium Measurements    Dimensions   LA size 5 3 cm               Atrial Septum Measurements    Shunt Ratio   LVOT peak VTI 20 58 cm         LVOT peak reji 0 88 m/s               Aortic Valve Measurements    Stenosis   Aortic valve peak velocity 3 21 m/s         LVOT peak reji 0 88 m/s         Ao VTI 75 16 cm         LVOT peak VTI 20 58 cm         AV mean gradient 26 mmHg         LVOT mn grad 2 mmHg         AV peak gradient 41 mmHg         AV LVOT peak gradient 3 mmHg         Area/Dimensions   DVI 0 27          AV valve area 0 86 cm2         AV area by cont VTI 0 9 cm2         AV area peak sergey 0 9 cm2         LVOT diameter 2 cm         LVOT area 3 14 cm2               Tricuspid Valve Measurements    RVSP Parameters   TR Peak Sergey 3 1 m/s         Est  RA pres 8 mmHg         Triscuspid Valve Regurgitation Peak Gradient 39 mmHg         Right Ventricular Peak Systolic Pressure 47 mmHg               Aorta Measurements    Aortic Dimensions   Ao root 3 2 cm           NM Myocardial Perfusion scan: 2/9/23    •  Resting ECG: The ECG shows atrial fibrillation  •  Stress ECG: A pharmacological stress test was performed using regadenoson  The patient reached the end of the protocol  •  Stress ECG: No ST deviation is noted  The ECG was negative for ischemia  The ECG was not diagnostic due to pharmacological (vasodilator) stress  •  Perfusion: There are no perfusion defects  •  Stress Function: Left ventricular function post-stress is normal  Post-stress ejection fraction is 70 %    •  Stress Combined Conclusion: The ECG and SPECT imaging portions of the stress study are concordant with no evidence of stress induced myocardial ischemia        I have personally reviewed pertinent films in PACS     PCP and Cardiology notes reviewed    TAVR evaluation Assessment:     [place]: III    Aortic Stenosis Stage: D3    5 Meter Walk: 6 sec, 6 sec, 7 sec    STS risk score (preliminary): 1 5%    KCCQ-12 completed    Assessment:  Patient Active Problem List    Diagnosis Date Noted   • Hypertensive heart disease with congestive heart failure ([facility] ) 10/29/2021   • Mixed hyperlipidemia 01/07/2021   • History of colon polyps 11/12/2018   • Allergic rhinitis due to pollen 09/26/2017   • Paroxysmal atrial fibrillation ([facility] ) 08/23/2017   • Chronic diastolic CHF (congestive heart failure) ([facility] ) 07/13/2017   • Obstructive sleep apnea of adult 07/13/2017   • Severe aortic stenosis 04/04/2017   • Erectile dysfunction 02/23/2017   • Paroxysmal atrial tachycardia ([facility] ) 03/27/2015   • Nonocclusive coronary atherosclerosis of native coronary artery 03/26/2015   • Dyslipidemia 03/26/2015   • Class 2 severe obesity due to excess calories with serious comorbidity and body mass index (BMI) of 37 0 to 37 9 in adult [facility]) 03/26/2015   • Renovascular hypertension 10/31/2012   • Rheumatoid arthritis ([facility] ) 10/18/2012   • GERD without esophagitis 10/16/2012         Impression/Plan:    Joe Dia Cyndi Robin  has symptomatic severe aortic stenosis  They will undergo the following testing for transcatheter aortic valve replacement: Gated CTA of the chest/abdomen/pelvis, cardiac catheterization, dental clearance and carotid artery ultrasound  Once these studies have been completed, Teola Aschoff Sr  will follow up in our office to review the results and to be evaluated to confirm the suitability of proceeding with transcatheter aortic valve replacment  Teola Aschoff Sr  was comfortable with our recommendations, and their questions were answered to their satisfaction  Thank you for allowing us to participate in the care of this patient  The patient recently had a screening colonoscopy in 8/8/22  Therefore GI referral is not indicated at this time       SIGNATURE: JACQUIE Gasca  DATE: February 24, 2023  TIME: 10:26 AM

## 2023-02-24 NOTE — H&P (VIEW-ONLY)
Consultation - Cardiothoracic Surgery   Ruslan Burgos  78 y o  male MRN: [id number]    Physician Requesting Consult: Dr Dc Modi    Reason for Consult / Principal Problem: Aortic stenosis, Non-Rheumatic    History of Present Illness: Ruslan Burgos  is a 78y o  year old male who presents for initial outpatient surgical consultation for symptomatic severe aortic stenosis  Patient's PMHx is notable for AS, CAD (non-obst), Afib (Eliquis), HTN, HLD, MARTINA (no CPAP), CHF, pre-diabetes (Ac1 6 4%), morbid obesity (BMI 36 5), GERD, RA, h/o diverticulitis s/p colectomy and colon polyps  Patient with known h/o AS, identified on echo in 2020  He has been under surveillance by his cardiologist  Most recent echo demonstrates progression of his AS to severe range  Patient accompanied by his wife today  He reports worsening GONZALEZ, for example, walking a distance of ~ 100 yds on his driveway to the mailbox with an incline, he needs to stop and rest to catch his breath  He reports a decrease in activity tolerance and an increase in his chronic LE edema  He denies chest pain, lightheadedness, palpitations, PND or orthopnea  He denies h/o heart murmur or rheumatic fever ion childhood  He does not use CPAP at night  Patient denies tobacco, admits to a few beer per week and denies drug use  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  Covid vax x 2    No dental care since pre-pandemic  Past Medical History:  Past Medical History:   Diagnosis Date   • Atrial fibrillation [facility])    • Colon polyp    • Hyperlipidemia    • Hypertension    • Nonocclusive coronary atherosclerosis of native coronary artery 03/26/2015         Past Surgical History:   Past Surgical History:   Procedure Laterality Date   • APPENDECTOMY     • BACK SURGERY  1996    disc repair   • CARDIAC CATHETERIZATION      50% lesion of mid LAD, 40% lesion of first obtuse marginal lesion   Last assessed 1/17/2018    • CHOLECYSTECTOMY • COLON SURGERY      colon ressection   • COLONOSCOPY N/A 01/14/2019    Procedure: COLONOSCOPY;  Surgeon: Luis Rushing MD;  Location: [facility];  Service: Colorectal   • COLONOSCOPY  08/08/2022   • CYSTOSCOPY      Diagnostic    • HEMORRHOID SURGERY  08/2011   • TONSILLECTOMY           Family History:  Family History   Problem Relation Age of Onset   • Diabetes Mother    • Stroke Mother          Social History:    Social History     Substance and Sexual Activity   Alcohol Use Yes   • Alcohol/week: 4 0 standard drinks   • Types: 4 Cans of beer per week    Comment: daily     Social History     Substance and Sexual Activity   Drug Use No     Social History     Tobacco Use   Smoking Status Former   Smokeless Tobacco Former         Home Medications:   Prior to Admission medications    Medication Sig Start Date End Date Taking? Authorizing Provider   Eliquis 5 MG TAKE ONE TABLET BY MOUTH TWICE A DAY  2/3/23   Tigist Patricia PA-C   ezetimibe (ZETIA) 10 mg tablet Take 1 tablet (10 mg total) by mouth daily 11/25/22   Alannah Hein,    folic acid (FOLVITE) 1 mg tablet TAKE (1) TABLET BY MOUTH DAILY  12/2/22   Alannah Hein DO   furosemide (LASIX) 40 mg tablet TAKE ONE TABLET BY MOUTH TWICE A DAY  12/2/22   Shabbir Rodriguez,    lisinopril (ZESTRIL) 20 mg tablet TAKE (1) TABLET BY MOUTH TWICE DAILY  1/4/23   Alannah Hein, DO   losartan (COZAAR) 100 MG tablet TAKE (1) TABLET BY MOUTH DAILY  1/4/23   Alannah Hein DO   metoprolol tartrate (LOPRESSOR) 50 mg tablet Take 1 tablet (50 mg total) by mouth every 12 (twelve) hours 12/2/22   Tigist Patricia PA-C   NIFEdipine (PROCARDIA XL) 90 mg 24 hr tablet TAKE 1 TABLET DAILY  12/2/22   Shabbir Rodriguez DO   omeprazole (PriLOSEC) 20 mg delayed release capsule TAKE (1) CAPSULE DAILY  12/2/22   Alannah Hein,    predniSONE 5 mg tablet TAKE ONE TABLET DAILY  12/2/22   Alannah Hein DO   rosuvastatin (CRESTOR) 20 MG tablet TAKE ONE TABLET BY MOUTH DAILY  12/2/22   Constanza Soria DO   triamcinolone (KENALOG) 0 1 % cream Apply topically 2 (two) times a day 8/16/22   Americo Gerardo DO       Allergies:  No Known Allergies    Review of Systems:  Review of Systems - History obtained from chart review and the patient  General ROS: positive for  - fatigue and change in activity tolerance   negative for - chills, fever, sleep disturbance or weight gain  Psychological ROS: negative  Ophthalmic ROS: negative  ENT ROS: negative  Allergy and Immunology ROS: negative  Hematological and Lymphatic ROS: negative  Endocrine ROS: negative  Breast ROS: negative  Respiratory ROS: no cough, shortness of breath, or wheezing  Cardiovascular ROS: positive for - murmur, dyspnea on exertion, edema and irregular heartbeat  negative for - chest pain, loss of consciousness, orthopnea, palpitations, paroxysmal nocturnal dyspnea or rapid heart rate  Gastrointestinal ROS: no abdominal pain, change in bowel habits, or black or bloody stools  Genito-Urinary ROS: no dysuria, trouble voiding, or hematuria  Musculoskeletal ROS: positive for - back pain; arthralgias  Neurological ROS: no TIA or stroke symptoms  Dermatological ROS: negative    Vital Signs:     Vitals:    02/24/23 1014 02/24/23 1023   BP: 128/60 129/73   BP Location: Right arm Left arm   Patient Position: Sitting Sitting   Cuff Size: Large Large   Pulse: 104    Temp: 98 2 °F (36 8 °C)    TempSrc: Tympanic    SpO2: 98%    Weight: 112 kg (247 lb)    Height: 5' 9" (1 753 m)        Physical Exam:    General:  Alert, oriented, well developed, no acute distress  HEENT/NECK:  PERRLA  No jugular venous distention  Cardiac:Irregular rhythm, II-III/VI harsh systolic murmur RUSB   Carotid arteries: 1+ pulses, no bruits  Pulmonary:  Breath sounds clear bilaterally  Abdomen:  Non-tender, Non-distended  Positive bowel sounds  Upper extremities: 2+ radial pulses; brisk capillary refill  Lower extremities: Extremities warm/dry     PT/DP pulses 1+ bilaterally  3+ edema B/L  Neuro: Alert and oriented X 3  Sensation is grossly intact  No focal deficits  Musculoskeletal: MAEE, stable gait  Skin: Warm/Dry, without rashes or lesions  Lab Results:   Lab Results   Component Value Date     12/31/2015    SODIUM 140 01/10/2023    K 3 5 01/10/2023     01/10/2023    CO2 29 01/10/2023    ANIONGAP 10 12/31/2015    AGAP 10 01/10/2023    BUN 12 01/10/2023    CREATININE 0 99 01/10/2023    GLUC 121 04/20/2021    GLUF 117 (H) 01/10/2023    CALCIUM 8 8 01/10/2023    AST 39 01/10/2023    ALT 46 01/10/2023    ALKPHOS 62 01/10/2023    PROT 7 4 12/31/2015    TP 7 8 01/10/2023    BILITOT 1 07 (H) 12/31/2015    TBILI 1 46 (H) 01/10/2023    EGFR 74 01/10/2023         Lab Results   Component Value Date    HGBA1C 6 4 (H) 01/10/2023     Lab Results   Component Value Date    CKTOTAL 145 06/20/2019    CKMB 1 4 05/24/2018    CKMBINDEX <1 0 05/24/2018    TROPONINI <0 04 03/12/2015       Imaging Studies:     Echocardiogram: 2/9/23       •  Left Ventricle: Left ventricular cavity size is normal  Wall thickness is increased  There is mild to moderate concentric hypertrophy  The left ventricular ejection fraction is 65%  Systolic function is normal  Wall motion is normal   •  Left Atrium: The atrium is moderately dilated  •  Right Atrium: The atrium is moderately dilated  •  Aortic Valve: The aortic valve is trileaflet  The leaflets are not thickened  The leaflets are moderately calcified  There is severely reduced mobility  There is severe stenosis  Peak gradient is 41 mmHg, mean gradient is 26 mmHg  TY 0 9 cm2   •  Mitral Valve: There is mild regurgitation  •  Tricuspid Valve: There is mild regurgitation  •  Pulmonic Valve: There is mild regurgitation      Findings    Left Ventricle Left ventricular cavity size is normal  Wall thickness is increased  There is mild to moderate concentric hypertrophy  The left ventricular ejection fraction is 65%   Systolic function is normal   Wall motion is normal    Right Ventricle Right ventricular cavity size is normal  Systolic function is normal  Wall thickness is normal    Left Atrium The atrium is moderately dilated  Right Atrium The atrium is moderately dilated  Aortic Valve The aortic valve is trileaflet  The leaflets are not thickened  The leaflets are moderately calcified  There is severely reduced mobility  There is no evidence of regurgitation  There is severe stenosis  Peak gradient is 41 mmHg, mean gradient is 26 mmHg  TY 0 9 cm2  Mitral Valve Mitral valve structure is normal  There is mild regurgitation  There is no evidence of stenosis  Tricuspid Valve Tricuspid valve structure is normal  There is mild regurgitation  There is no evidence of stenosis  The estimated right ventricular systolic pressure is 06 10 mmHg  Pulmonic Valve Pulmonic valve structure is normal  There is mild regurgitation  There is no evidence of stenosis  Ascending Aorta The aortic root is normal in size  IVC/SVC The right atrial pressure is estimated at 8 0 mmHg  The inferior vena cava is normal in size  Pericardium There is no pericardial effusion  The pericardium is normal in appearance       Left Ventricle Measurements    Function/Volumes   LVOT stroke volume 64 62 cm3         LVOT stroke volume index 29 8 ml/m2         Dimensions   LVIDd 5 1 cm         LVIDS 3 1 cm         IVSd 1 5 cm         LVPWd 1 5 cm         LVOT area 3 14 cm2         FS 39 %          Report Measurements   AV LVOT peak gradient 3 mmHg              Interventricular Septum Measurements    Shunt Ratio   LVOT peak VTI 20 58 cm         LVOT peak reji 0 88 m/s              Left Atrium Measurements    Dimensions   LA size 5 3 cm               Atrial Septum Measurements    Shunt Ratio   LVOT peak VTI 20 58 cm         LVOT peak reji 0 88 m/s               Aortic Valve Measurements    Stenosis   Aortic valve peak velocity 3 21 m/s         LVOT peak reji 0 88 m/s         Ao VTI 75 16 cm         LVOT peak VTI 20 58 cm         AV mean gradient 26 mmHg         LVOT mn grad 2 mmHg         AV peak gradient 41 mmHg         AV LVOT peak gradient 3 mmHg         Area/Dimensions   DVI 0 27          AV valve area 0 86 cm2         AV area by cont VTI 0 9 cm2         AV area peak sergey 0 9 cm2         LVOT diameter 2 cm         LVOT area 3 14 cm2               Tricuspid Valve Measurements    RVSP Parameters   TR Peak Sergey 3 1 m/s         Est  RA pres 8 mmHg         Triscuspid Valve Regurgitation Peak Gradient 39 mmHg         Right Ventricular Peak Systolic Pressure 47 mmHg               Aorta Measurements    Aortic Dimensions   Ao root 3 2 cm           NM Myocardial Perfusion scan: 2/9/23    •  Resting ECG: The ECG shows atrial fibrillation  •  Stress ECG: A pharmacological stress test was performed using regadenoson  The patient reached the end of the protocol  •  Stress ECG: No ST deviation is noted  The ECG was negative for ischemia  The ECG was not diagnostic due to pharmacological (vasodilator) stress  •  Perfusion: There are no perfusion defects  •  Stress Function: Left ventricular function post-stress is normal  Post-stress ejection fraction is 70 %    •  Stress Combined Conclusion: The ECG and SPECT imaging portions of the stress study are concordant with no evidence of stress induced myocardial ischemia        I have personally reviewed pertinent films in PACS     PCP and Cardiology notes reviewed    TAVR evaluation Assessment:     Recardo Beba: III    Aortic Stenosis Stage: D3    5 Meter Walk: 6 sec, 6 sec, 7 sec    STS risk score (preliminary): 1 5%    KCCQ-12 completed    Assessment:  Patient Active Problem List    Diagnosis Date Noted   • Hypertensive heart disease with congestive heart failure ([facility] ) 10/29/2021   • Mixed hyperlipidemia 01/07/2021   • History of colon polyps 11/12/2018   • Allergic rhinitis due to pollen 09/26/2017   • Paroxysmal atrial fibrillation ([facility] ) 08/23/2017   • Chronic diastolic CHF (congestive heart failure) ([facility] ) 07/13/2017   • Obstructive sleep apnea of adult 07/13/2017   • Severe aortic stenosis 04/04/2017   • Erectile dysfunction 02/23/2017   • Paroxysmal atrial tachycardia ([facility] ) 03/27/2015   • Nonocclusive coronary atherosclerosis of native coronary artery 03/26/2015   • Dyslipidemia 03/26/2015   • Class 2 severe obesity due to excess calories with serious comorbidity and body mass index (BMI) of 37 0 to 37 9 in adult [facility]) 03/26/2015   • Renovascular hypertension 10/31/2012   • Rheumatoid arthritis ([facility] ) 10/18/2012   • GERD without esophagitis 10/16/2012         Impression/Plan:    Shraddha Hanna Sr  has symptomatic severe aortic stenosis  They will undergo the following testing for transcatheter aortic valve replacement: Gated CTA of the chest/abdomen/pelvis, cardiac catheterization, dental clearance and carotid artery ultrasound  Once these studies have been completed, Genet Montero Sr  will follow up in our office to review the results and to be evaluated to confirm the suitability of proceeding with transcatheter aortic valve replacment  Genet Montero Sr  was comfortable with our recommendations, and their questions were answered to their satisfaction  Thank you for allowing us to participate in the care of this patient  The patient recently had a screening colonoscopy in 8/8/22  Therefore GI referral is not indicated at this time       SIGNATURE: JACQUIE Hayden  DATE: February 24, 2023  TIME: 10:26 AM

## 2023-02-24 NOTE — LETTER
2023         Esther Hanna Sr               : 1945        MRN: [id number]  [address] [facility] [id number]         Procedure Name: RIGHT + LEFT HEART CATHETERIZATION     Procedure date: 3/13/23     Location: [facility]  Address: [address], [address] [place] [address]     The hospital will contact you the day prior to your procedure, usually between 4PM - 6PM to instruct you on the time and place to report  If you do not hear from a [facility]'s  by 6PM the evening prior to your procedure, please contact the [place] that you are scheduled at  [place]: [address] [place] [address] 98: [phone] , [facility], [address] [facility] [phone]              [facility]: [address] [place] [address] [phone]     • You may have nothing to eat or drink from midnight the night prior to your procedure  You may have a minimal amount of water with your morning medications  DO NOT stop taking Plavix or Aspirin unless advised otherwise      • If your procedure is scheduled after 12:00 noon, you may have clear liquids until 8:00AM the morning of your procedure  Clear liquids are 7UP, Ginger Ale, Jello or broth      • Arrange for a responsible person to drive you to and from the hospital      • Please shower/bathe the night before your procedure and do not use powders or lotions      • Please notify us if you have been admitted to the hospital within the past 30 days      • Bring a list of daily medications, vitamins, minerals, herbals and nutritional supplements you take  Include dosage and time you take them each day      • If packing an overnight bag, pack minimal clothing, you will be given hospital sleepwear  Do not bring money, valuables or jewelry   Wedding band is OK      • Have your Photo ID and Insurance cards with you          • DO NOT take any diabetic medication, including insulin, the morning of the procedure   Oral diabetic medications may include: Glucophage, Prandin, Glyburide, Micronase, Avandia, Glocovance, Precose, Glynase y Starlix      • You should continue to take your morning dose of heart and/or blood pressure medications with a sip of water UNLESS ADVISED OTHERWISE      Special Instructions:     Medication holds:   Eliquis - Hold night prior and morning of procedure       Furosemide - Hold morning of procedure      Labs to be done by 3/6/23:  CMP / CBC (fasting)            Thank you,   [address] [place] [address] Cardiology   [facility], [address]  Ph: [phone]

## 2023-02-24 NOTE — TELEPHONE ENCOUNTER
----- Message from Ivonne Jordan sent at 2/24/2023 11:32 AM EST -----  Regarding: Cath  Please schedule patient for:    Pre TAVR Cardiac Cath: to be scheduled in the next few weeks  Patient has Medicare as primary insurance and had bloodwork done at the end of January  To be done at: [facility]    To be done by:    Please address any questions regarding this request to Giorgi Nuñez or Genevieve Dc,    Thank you

## 2023-02-24 NOTE — TELEPHONE ENCOUNTER
Patient scheduled for Right + Left Heart CATH on 3/13/23 at [address] [facility] with Dr Vlad Amaya  Instructions sent to patient through [address]  Also mailed to patient's home  Patient aware of all general instructions  Medication holds:   Eliquis - Hold night prior and morning of procedure  Furosemide - Hold morning of procedure  Labs to be done by 3/6/23:  CMP / CBC (fasting)     Insurance: Medicare     Please obtain auth       Thank you,  Piotr Mcpherson

## 2023-02-27 NOTE — TELEPHONE ENCOUNTER
Yoni Erickson is not required   Verified that patient has Medicare primary with a supplement secondary

## 2023-03-01 ENCOUNTER — APPOINTMENT (OUTPATIENT)
Dept: LAB | Facility: HOSPITAL | Age: 78
End: 2023-03-01
Attending: INTERNAL MEDICINE

## 2023-03-01 LAB
ALBUMIN SERPL BCP-MCNC: 4.1 G/DL (ref 3.5–5)
ALP SERPL-CCNC: 42 U/L (ref 34–104)
ALT SERPL W P-5'-P-CCNC: 25 U/L (ref 7–52)
ANION GAP SERPL CALCULATED.3IONS-SCNC: 11 MMOL/L (ref 4–13)
AST SERPL W P-5'-P-CCNC: 24 U/L (ref 13–39)
BASOPHILS # BLD AUTO: 0.03 THOUSANDS/ÂΜL (ref 0–0.1)
BASOPHILS NFR BLD AUTO: 0 % (ref 0–1)
BILIRUB SERPL-MCNC: 1.59 MG/DL (ref 0.2–1)
BUN SERPL-MCNC: 17 MG/DL (ref 5–25)
CALCIUM SERPL-MCNC: 8.9 MG/DL (ref 8.4–10.2)
CHLORIDE SERPL-SCNC: 100 MMOL/L (ref 96–108)
CO2 SERPL-SCNC: 27 MMOL/L (ref 21–32)
CREAT SERPL-MCNC: 0.76 MG/DL (ref 0.6–1.3)
EOSINOPHIL # BLD AUTO: 0.15 THOUSAND/ÂΜL (ref 0–0.61)
EOSINOPHIL NFR BLD AUTO: 1 % (ref 0–6)
ERYTHROCYTE [DISTWIDTH] IN BLOOD BY AUTOMATED COUNT: 12.7 % (ref 11.6–15.1)
GFR SERPL CREATININE-BSD FRML MDRD: 89 ML/MIN/1.73SQ M
GLUCOSE P FAST SERPL-MCNC: 123 MG/DL (ref 65–99)
HCT VFR BLD AUTO: 45.6 % (ref 36.5–49.3)
HGB BLD-MCNC: 15 G/DL (ref 12–17)
IMM GRANULOCYTES # BLD AUTO: 0.03 THOUSAND/UL (ref 0–0.2)
IMM GRANULOCYTES NFR BLD AUTO: 0 % (ref 0–2)
LYMPHOCYTES # BLD AUTO: 3.69 THOUSANDS/ÂΜL (ref 0.6–4.47)
LYMPHOCYTES NFR BLD AUTO: 34 % (ref 14–44)
MCH RBC QN AUTO: 28.3 PG (ref 26.8–34.3)
MCHC RBC AUTO-ENTMCNC: 32.9 G/DL (ref 31.4–37.4)
MCV RBC AUTO: 86 FL (ref 82–98)
MONOCYTES # BLD AUTO: 1.09 THOUSAND/ÂΜL (ref 0.17–1.22)
MONOCYTES NFR BLD AUTO: 10 % (ref 4–12)
NEUTROPHILS # BLD AUTO: 5.76 THOUSANDS/ÂΜL (ref 1.85–7.62)
NEUTS SEG NFR BLD AUTO: 55 % (ref 43–75)
NRBC BLD AUTO-RTO: 0 /100 WBCS
PLATELET # BLD AUTO: 160 THOUSANDS/UL (ref 149–390)
PMV BLD AUTO: 9.7 FL (ref 8.9–12.7)
POTASSIUM SERPL-SCNC: 3.2 MMOL/L (ref 3.5–5.3)
PROT SERPL-MCNC: 7.1 G/DL (ref 6.4–8.4)
RBC # BLD AUTO: 5.3 MILLION/UL (ref 3.88–5.62)
SODIUM SERPL-SCNC: 138 MMOL/L (ref 135–147)
WBC # BLD AUTO: 10.75 THOUSAND/UL (ref 4.31–10.16)

## 2023-03-02 ENCOUNTER — HOSPITAL ENCOUNTER (OUTPATIENT)
Dept: NON INVASIVE DIAGNOSTICS | Facility: HOSPITAL | Age: 78
Discharge: HOME | End: 2023-03-02

## 2023-03-02 ENCOUNTER — HOSPITAL ENCOUNTER (OUTPATIENT)
Dept: RADIOLOGY | Facility: HOSPITAL | Age: 78
Discharge: HOME | End: 2023-03-02

## 2023-03-02 DIAGNOSIS — Z13.6 ENCOUNTER FOR SCREENING FOR STENOSIS OF CAROTID ARTERY: ICD-10-CM

## 2023-03-02 DIAGNOSIS — I35.0 SEVERE AORTIC STENOSIS: ICD-10-CM

## 2023-03-02 RX ORDER — IODIXANOL 320 MG/ML
120 INJECTION, SOLUTION INTRAVASCULAR
Status: COMPLETED | OUTPATIENT
Start: 2023-03-02 | End: 2023-03-02

## 2023-03-02 RX ADMIN — IODIXANOL 120 ML: 320 INJECTION, SOLUTION INTRAVASCULAR at 11:00

## 2023-03-13 ENCOUNTER — HOSPITAL ENCOUNTER (OUTPATIENT)
Facility: HOSPITAL | Age: 78
Setting detail: OUTPATIENT SURGERY
Discharge: HOME | End: 2023-03-13
Attending: INTERNAL MEDICINE | Admitting: INTERNAL MEDICINE

## 2023-03-13 VITALS
TEMPERATURE: 98 F | WEIGHT: 250 LBS | HEIGHT: 69 IN | BODY MASS INDEX: 37.03 KG/M2 | SYSTOLIC BLOOD PRESSURE: 114 MMHG | DIASTOLIC BLOOD PRESSURE: 73 MMHG | OXYGEN SATURATION: 93 % | RESPIRATION RATE: 18 BRPM | HEART RATE: 74 BPM

## 2023-03-13 DIAGNOSIS — I35.0 SEVERE AORTIC STENOSIS: ICD-10-CM

## 2023-03-13 DIAGNOSIS — I48.0 PAF (PAROXYSMAL ATRIAL FIBRILLATION) (HCC): ICD-10-CM

## 2023-03-13 DIAGNOSIS — I11.0 HYPERTENSIVE HEART DISEASE WITH CONGESTIVE HEART FAILURE (HCC): Primary | ICD-10-CM

## 2023-03-13 DIAGNOSIS — I10 ESSENTIAL HYPERTENSION: ICD-10-CM

## 2023-03-13 DIAGNOSIS — I50.32 CHF (CONGESTIVE HEART FAILURE), NYHA CLASS II, CHRONIC, DIASTOLIC (HCC): ICD-10-CM

## 2023-03-13 LAB
ATRIAL RATE: 86 BPM
QRS AXIS: 20 DEGREES
QRSD INTERVAL: 106 MS
QT INTERVAL: 428 MS
QTC INTERVAL: 502 MS
T WAVE AXIS: -28 DEGREES
VENTRICULAR RATE: 83 BPM

## 2023-03-13 RX ORDER — LIDOCAINE HYDROCHLORIDE 10 MG/ML
INJECTION, SOLUTION EPIDURAL; INFILTRATION; INTRACAUDAL; PERINEURAL CODE/TRAUMA/SEDATION MEDICATION
Status: DISCONTINUED | OUTPATIENT
Start: 2023-03-13 | End: 2023-03-13 | Stop reason: HOSPADM

## 2023-03-13 RX ORDER — ASPIRIN 81 MG/1
324 TABLET, CHEWABLE ORAL ONCE
Status: COMPLETED | OUTPATIENT
Start: 2023-03-13 | End: 2023-03-13

## 2023-03-13 RX ORDER — MIDAZOLAM HYDROCHLORIDE 2 MG/2ML
INJECTION, SOLUTION INTRAMUSCULAR; INTRAVENOUS CODE/TRAUMA/SEDATION MEDICATION
Status: DISCONTINUED | OUTPATIENT
Start: 2023-03-13 | End: 2023-03-13 | Stop reason: HOSPADM

## 2023-03-13 RX ORDER — LOSARTAN POTASSIUM 100 MG/1
100 TABLET ORAL DAILY
Qty: 30 TABLET | Refills: 0
Start: 2023-03-14

## 2023-03-13 RX ORDER — ACETAMINOPHEN 325 MG/1
650 TABLET ORAL EVERY 6 HOURS PRN
Status: DISCONTINUED | OUTPATIENT
Start: 2023-03-13 | End: 2023-03-13 | Stop reason: HOSPADM

## 2023-03-13 RX ORDER — NITROGLYCERIN 20 MG/100ML
INJECTION INTRAVENOUS CODE/TRAUMA/SEDATION MEDICATION
Status: DISCONTINUED | OUTPATIENT
Start: 2023-03-13 | End: 2023-03-13 | Stop reason: HOSPADM

## 2023-03-13 RX ORDER — LISINOPRIL 20 MG/1
20 TABLET ORAL 2 TIMES DAILY
Qty: 60 TABLET | Refills: 0
Start: 2023-03-14

## 2023-03-13 RX ORDER — SODIUM CHLORIDE 9 MG/ML
75 INJECTION, SOLUTION INTRAVENOUS CONTINUOUS
Status: DISCONTINUED | OUTPATIENT
Start: 2023-03-13 | End: 2023-03-13 | Stop reason: HOSPADM

## 2023-03-13 RX ORDER — HEPARIN SODIUM 1000 [USP'U]/ML
INJECTION, SOLUTION INTRAVENOUS; SUBCUTANEOUS CODE/TRAUMA/SEDATION MEDICATION
Status: DISCONTINUED | OUTPATIENT
Start: 2023-03-13 | End: 2023-03-13 | Stop reason: HOSPADM

## 2023-03-13 RX ORDER — FUROSEMIDE 40 MG/1
40 TABLET ORAL 2 TIMES DAILY
Qty: 60 TABLET | Refills: 0
Start: 2023-03-14

## 2023-03-13 RX ORDER — ONDANSETRON 2 MG/ML
4 INJECTION INTRAMUSCULAR; INTRAVENOUS EVERY 8 HOURS PRN
Status: DISCONTINUED | OUTPATIENT
Start: 2023-03-13 | End: 2023-03-13 | Stop reason: HOSPADM

## 2023-03-13 RX ORDER — FENTANYL CITRATE 50 UG/ML
INJECTION, SOLUTION INTRAMUSCULAR; INTRAVENOUS CODE/TRAUMA/SEDATION MEDICATION
Status: DISCONTINUED | OUTPATIENT
Start: 2023-03-13 | End: 2023-03-13 | Stop reason: HOSPADM

## 2023-03-13 RX ORDER — SODIUM CHLORIDE 9 MG/ML
125 INJECTION, SOLUTION INTRAVENOUS CONTINUOUS
Status: DISCONTINUED | OUTPATIENT
Start: 2023-03-13 | End: 2023-03-13

## 2023-03-13 RX ORDER — VERAPAMIL HYDROCHLORIDE 2.5 MG/ML
INJECTION, SOLUTION INTRAVENOUS CODE/TRAUMA/SEDATION MEDICATION
Status: DISCONTINUED | OUTPATIENT
Start: 2023-03-13 | End: 2023-03-13 | Stop reason: HOSPADM

## 2023-03-13 RX ADMIN — SODIUM CHLORIDE 125 ML/HR: 0.9 INJECTION, SOLUTION INTRAVENOUS at 07:45

## 2023-03-13 RX ADMIN — ASPIRIN 324 MG: 81 TABLET, CHEWABLE ORAL at 07:48

## 2023-03-13 NOTE — INTERVAL H&P NOTE
H&P reviewed  After examining the patient I find no changes in the patients condition since the H&P had been written      Vitals:    03/13/23 0758   BP: 142/79   Pulse: 90   Resp: 16   Temp: 97 9 °F (36 6 °C)   SpO2: 97%       For pre-TAVR coronary angiogram in setting paradoxical low-flow low-gradient calcific AS (TY 0 9 cm2, mean 26 mmHg, 3 2 m/s, DI 0 27, SVi 29 7 mL/m2)  -2/9 TTE: LVEF 65%, mod LVH, no WMA, normal RV size & function, biatrial enlargement, 1+ MR/TR with RVSP 47 mmHg    CAD Hx  Documented in Dr Juan Wu note from 2017 "50% lesion of mid lad; 40% lesion of first obtuse marginal lesion"  -2/9 Rx nuc stress: no perfusion defect, gated EF 70%        ECG 03/13/23  Atrial fibrillation, VR 73        Amanuel Lopez MD / 03/13/23 / 9:02 AM

## 2023-03-13 NOTE — DISCHARGE INSTR - AVS FIRST PAGE
1  Please see the post cardiac catheterization dishcarge instructions  No heavy lifting, greater than 10 lbs  or strenuous  activity for 48 hrs  2 Remove band aid tomorrow  Shower and wash area- wrist gently with soap and water- beginning tomorrow  Rinse and pat dry  Apply new water seal band aid  Repeat this process for 5 days  No powders, creams lotions or antibiotic ointments  for 5 days  No tub baths, hot tubs or swimming for 5 days  3  Please call our office ([phone]) if you have any fever, redness, swelling, discharge from your wrist access site      4 No driving for 1 day

## 2023-04-07 ENCOUNTER — OFFICE VISIT (OUTPATIENT)
Dept: CARDIAC SURGERY | Facility: CLINIC | Age: 78
End: 2023-04-07

## 2023-04-07 VITALS
WEIGHT: 245.9 LBS | SYSTOLIC BLOOD PRESSURE: 127 MMHG | HEART RATE: 89 BPM | BODY MASS INDEX: 36.42 KG/M2 | DIASTOLIC BLOOD PRESSURE: 68 MMHG | OXYGEN SATURATION: 97 % | TEMPERATURE: 97.8 F | HEIGHT: 69 IN

## 2023-04-07 DIAGNOSIS — I35.0 SEVERE AORTIC STENOSIS: Primary | ICD-10-CM

## 2023-04-07 RX ORDER — CHLORHEXIDINE GLUCONATE 0.12 MG/ML
15 RINSE ORAL ONCE
OUTPATIENT
Start: 2023-04-07 | End: 2023-04-07

## 2023-04-07 RX ORDER — CEFAZOLIN SODIUM 2 G/50ML
2000 SOLUTION INTRAVENOUS ONCE
OUTPATIENT
Start: 2023-04-07 | End: 2023-04-07

## 2023-04-07 NOTE — PROGRESS NOTES
Pre-Op History & Physical - Cardiothoracic Surgery   Ruel Trujillo Sr  78 y o  male MRN: [id number]    Physician Requesting Consult: Dr Estuardo Duvall    Reason for Consult / Principal Problem: Aortic stenosis, Non-Rheumatic    History of Present Illness: Tiara Bryan is a 78y o  year old male who was previously evaluated in our office by JACQUI Melara  for transcatheter aortic valve replacement  During this initial consultation, arrangements were made for the following studies to be completed: Gated CTA of the chest/abdomen/pelvis,  cardiac catheterization, dental clearance and carotid artery ultrasound  Tiara Bryan now presents to review the results of these tests and obtain a second surgeon to confirm the suitability of proceeding with transcatheter aortic valve replacment  In review, Patient's PMHx is notable for AS, CAD (non-obst), Afib (Eliquis), HTN, HLD, MARTINA (no CPAP), CHF, pre-diabetes (Ac1 6 4%), morbid obesity (BMI 36 31), GERD, RA, h/o diverticulitis s/p colectomy and colon polyps      Patient with known h/o AS, identified on echo in 2020  He has been under surveillance by his cardiologist  Most recent echo demonstrates progression of his AS to severe range       Patient accompanied by his wife today  He reports worsening GONZALEZ, for example, walking a distance of ~ 100 yds on his driveway to the mailbox with an incline, he needs to stop and rest to catch his breath  He reports a decrease in activity tolerance and an increase in his chronic LE edema  He denies chest pain, lightheadedness, palpitations, PND or orthopnea  He denies h/o heart murmur or rheumatic fever ion childhood  He does not use CPAP at night       Patient denies tobacco, admits to a few beer per week and denies drug use      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  Covid vax x 2     Up to date with dental care        Past Medical History:  Past Medical History:   Diagnosis Date   • Atrial fibrillation ([facility] [address] )    • Colon polyp    • Hyperlipidemia    • Hypertension    • Nonocclusive coronary atherosclerosis of native coronary artery 03/26/2015         Past Surgical History:   Past Surgical History:   Procedure Laterality Date   • APPENDECTOMY     • BACK SURGERY  1996    disc repair   • CARDIAC CATHETERIZATION      50% lesion of mid LAD, 40% lesion of first obtuse marginal lesion  Last assessed 1/17/2018    • CARDIAC CATHETERIZATION N/A 3/13/2023    Procedure: Cardiac catheterization;  Surgeon: Doris Collins DO;  Location: [facility]; Service: Cardiology   • CARDIAC CATHETERIZATION N/A 3/13/2023    Procedure: Cardiac Coronary Angiogram;  Surgeon: Doris Collins DO;  Location: [facility]; Service: Cardiology   • CHOLECYSTECTOMY     • COLON SURGERY      colon ressection   • COLONOSCOPY N/A 01/14/2019    Procedure: COLONOSCOPY;  Surgeon: Suresh Baltazar MD;  Location: [facility];  Service: Colorectal   • COLONOSCOPY  08/08/2022   • CYSTOSCOPY      Diagnostic    • HEMORRHOID SURGERY  08/2011   • TONSILLECTOMY           Family History:  Family History   Problem Relation Age of Onset   • Diabetes Mother    • Stroke Mother          Social History:    Social History     Substance and Sexual Activity   Alcohol Use Yes   • Alcohol/week: 4 0 standard drinks   • Types: 4 Cans of beer per week    Comment: daily     Social History     Substance and Sexual Activity   Drug Use No     Social History     Tobacco Use   Smoking Status Former   Smokeless Tobacco Former       Home Medications:   Prior to Admission medications    Medication Sig Start Date End Date Taking? Authorizing Provider   apixaban (Eliquis) 5 mg Take 1 tablet (5 mg total) by mouth 2 (two) times a day 3/13/23   Dara Gowers, CRNP   ezetimibe (ZETIA) 10 mg tablet Take 1 tablet (10 mg total) by mouth daily 11/25/22   Dash Yoo DO   folic acid (FOLVITE) 1 mg tablet TAKE (1) TABLET BY MOUTH DAILY   12/2/22 Evangelina Brennan DO   furosemide (LASIX) 40 mg tablet Take 1 tablet (40 mg total) by mouth 2 (two) times a day Do not start before March 14, 2023  3/14/23   JACQUIE Gomez   lisinopril (ZESTRIL) 20 mg tablet Take 1 tablet (20 mg total) by mouth 2 (two) times a day Do not start before March 14, 2023  3/14/23   JACQUIE Gomez   losartan (COZAAR) 100 MG tablet Take 1 tablet (100 mg total) by mouth daily Do not start before March 14, 2023  3/14/23   JACQUIE Gomez   metoprolol tartrate (LOPRESSOR) 50 mg tablet Take 1 tablet (50 mg total) by mouth every 12 (twelve) hours 12/2/22   Tigist Moreno PA-C   NIFEdipine (PROCARDIA XL) 90 mg 24 hr tablet TAKE 1 TABLET DAILY  12/2/22   Spring Lee DO   omeprazole (PriLOSEC) 20 mg delayed release capsule TAKE (1) CAPSULE DAILY  12/2/22   [facility]   predniSONE 5 mg tablet TAKE ONE TABLET DAILY  12/2/22   [facility]   rosuvastatin (CRESTOR) 20 MG tablet TAKE ONE TABLET BY MOUTH DAILY   12/2/22   Spring Lee,        Allergies:  No Known Allergies    Review of Systems:  Review of Systems - History obtained from chart review and the patient  General ROS: positive for  - fatigue and change inactivity tolerance  negative for - chills, fever, sleep disturbance or weight gain  Psychological ROS: negative  Ophthalmic ROS: negative  ENT ROS: negative  Allergy and Immunology ROS: negative  Hematological and Lymphatic ROS: negative  Endocrine ROS: negative  Breast ROS: negative  Respiratory ROS: no cough, shortness of breath, or wheezing  Cardiovascular ROS: positive for - dyspnea on exertion, edema, irregular heartbeat and murmur  negative for - chest pain, loss of consciousness, orthopnea, palpitations, paroxysmal nocturnal dyspnea or rapid heart rate  Gastrointestinal ROS: no abdominal pain, change in bowel habits, or black or bloody stools  Genito-Urinary ROS: no dysuria, trouble voiding, or hematuria  Musculoskeletal ROS: positive for - back "pain and arthralgias  Neurological ROS: no TIA or stroke symptoms  Dermatological ROS: negative    Vital Signs:     Vitals:    04/07/23 1137   BP: 127/68   BP Location: Left arm   Patient Position: Sitting   Cuff Size: Large   Pulse: 89   Temp: 97 8 °F (36 6 °C)   TempSrc: Tympanic   SpO2: 97%   Weight: 112 kg (245 lb 14 4 oz)   Height: 5' 9\" (1 753 m)       Physical Exam:    General: Alert, oriented, obese, no acute distress  HEENT/NECK:  PERRLA  No jugular venous distention  Cardiac:Irregular rhythm, III/VI harsh systolic murmur RUSB  Carotids: 1+ pulses, no bruits   Pulmonary:  Breath sounds clear bilaterally  Abdomen:  Non-tender, Non-distended  Positive bowel sounds  Upper extremities: 2+ radial pulses; brisk capillary refill  Lower extremities: Extremities warm/dry  PT/DP pulses 2+ bilaterally  2+ edema B/L  Neuro: Alert and oriented X 3  Sensation is grossly intact  No focal deficits  Musculoskeletal: MAEE, stable gait  Skin: Warm/Dry, without rashes or lesions      Lab Results:   Lab Results   Component Value Date    WBC 10 75 (H) 03/01/2023    HGB 15 0 03/01/2023    HCT 45 6 03/01/2023    MCV 86 03/01/2023     03/01/2023     Lab Results   Component Value Date     12/31/2015    SODIUM 138 03/01/2023    K 3 2 (L) 03/01/2023     03/01/2023    CO2 27 03/01/2023    ANIONGAP 10 12/31/2015    AGAP 11 03/01/2023    BUN 17 03/01/2023    CREATININE 0 76 03/01/2023    GLUC 121 04/20/2021    GLUF 123 (H) 03/01/2023    CALCIUM 8 9 03/01/2023    AST 24 03/01/2023    ALT 25 03/01/2023    ALKPHOS 42 03/01/2023    PROT 7 4 12/31/2015    TP 7 1 03/01/2023    BILITOT 1 07 (H) 12/31/2015    TBILI 1 59 (H) 03/01/2023    EGFR 89 03/01/2023     Lab Results   Component Value Date    HGBA1C 6 4 (H) 01/10/2023     Lab Results   Component Value Date    CKTOTAL 145 06/20/2019    CKMB 1 4 05/24/2018    CKMBINDEX <1 0 05/24/2018    TROPONINI <0 04 03/12/2015       Imaging Studies:     Echocardiogram: " 2/19/23    •  Left Ventricle: Left ventricular cavity size is normal  Wall thickness is increased  There is mild to moderate concentric hypertrophy  The left ventricular ejection fraction is 65%  Systolic function is normal  Wall motion is normal   •  Left Atrium: The atrium is moderately dilated  •  Right Atrium: The atrium is moderately dilated  •  Aortic Valve: The aortic valve is trileaflet  The leaflets are not thickened  The leaflets are moderately calcified  There is severely reduced mobility  There is severe stenosis  Peak gradient is 41 mmHg, mean gradient is 26 mmHg  TY 0 9 cm2   •  Mitral Valve: There is mild regurgitation  •  Tricuspid Valve: There is mild regurgitation  •  Pulmonic Valve: There is mild regurgitation      Findings    Left Ventricle Left ventricular cavity size is normal  Wall thickness is increased  There is mild to moderate concentric hypertrophy  The left ventricular ejection fraction is 65%  Systolic function is normal   Wall motion is normal    Right Ventricle Right ventricular cavity size is normal  Systolic function is normal  Wall thickness is normal    Left Atrium The atrium is moderately dilated  Right Atrium The atrium is moderately dilated  Aortic Valve The aortic valve is trileaflet  The leaflets are not thickened  The leaflets are moderately calcified  There is severely reduced mobility  There is no evidence of regurgitation  There is severe stenosis  Peak gradient is 41 mmHg, mean gradient is 26 mmHg  TY 0 9 cm2  Mitral Valve Mitral valve structure is normal  There is mild regurgitation  There is no evidence of stenosis  Tricuspid Valve Tricuspid valve structure is normal  There is mild regurgitation  There is no evidence of stenosis  The estimated right ventricular systolic pressure is 26 32 mmHg  Pulmonic Valve Pulmonic valve structure is normal  There is mild regurgitation  There is no evidence of stenosis     Ascending Aorta The aortic root is normal in size    IVC/SVC The right atrial pressure is estimated at 8 0 mmHg  The inferior vena cava is normal in size  Pericardium There is no pericardial effusion  The pericardium is normal in appearance       Left Ventricle Measurements    Function/Volumes   LVOT stroke volume 64 62 cm3         LVOT stroke volume index 29 8 ml/m2         Dimensions   LVIDd 5 1 cm         LVIDS 3 1 cm         IVSd 1 5 cm         LVPWd 1 5 cm         LVOT area 3 14 cm2         FS 39 %          Report Measurements   AV LVOT peak gradient 3 mmHg              Interventricular Septum Measurements    Shunt Ratio   LVOT peak VTI 20 58 cm         LVOT peak sergey 0 88 m/s              Left Atrium Measurements    Dimensions   LA size 5 3 cm               Atrial Septum Measurements    Shunt Ratio   LVOT peak VTI 20 58 cm         LVOT peak sergey 0 88 m/s               Aortic Valve Measurements    Stenosis   Aortic valve peak velocity 3 21 m/s         LVOT peak sergey 0 88 m/s         Ao VTI 75 16 cm         LVOT peak VTI 20 58 cm         AV mean gradient 26 mmHg         LVOT mn grad 2 mmHg         AV peak gradient 41 mmHg         AV LVOT peak gradient 3 mmHg         Area/Dimensions   DVI 0 27          AV valve area 0 86 cm2         AV area by cont VTI 0 9 cm2         AV area peak sergey 0 9 cm2         LVOT diameter 2 cm         LVOT area 3 14 cm2               Tricuspid Valve Measurements    RVSP Parameters   TR Peak Sergey 3 1 m/s         Est  RA pres 8 mmHg         Triscuspid Valve Regurgitation Peak Gradient 39 mmHg         Right Ventricular Peak Systolic Pressure 47 mmHg               Aorta Measurements    Aortic Dimensions   Ao root 3 2 cm               IVC/SVC Measurements    IVC/SVC   Est  RA pres 8 mmHg                Gated CTA of the chest/abdomen/pelvis: 3/2/23    VASCULAR STRUCTURES:       Annulus: diameter 29 3 x 22 9 mm      area: 567 0 sq mm    Annulus to LCA: 14 6 mm    Annulus to RCA: 19 1 mm    Minimal diameter right iliofemoral segment: 8 0 mm Minimal diameter left iliofemoral segment: 4 5 mm     The ascending aorta is nonaneurysmal measuring 35 mm with mild atherosclerosis  There is a type I aortic arch with bovine branching anatomy and no stenosis in the visualized great vessels  The aortic arch is nonaneurysmal with mild atherosclerosis  The   descending thoracic aorta is nonaneurysmal with mild atherosclerosis      The abdominal aorta is nonaneurysmal with mild atherosclerosis  Celiac, superior mesenteric, inferior mesenteric, and bilateral renal arteries are patent  Bilateral iliofemoral arteries are nonaneurysmal with mild atherosclerosis      Cardiac findings: There is mild calcification of the aortic valve  The left ventricle is normal   The ventricular septum is normal   No prior valvular surgery  No prior bypass surgery  No pericardial effusion  No cardiac mass or thrombus  Coronary artery and mitral   annulus calcifications  ECG: 3/13/23    Atrial fibrillation with a competing junctional pacemaker  Nonspecific T wave abnormality  Prolonged QT    Cardiac catheterization: 3/13/23    Left Anterior Descending   There is moderate diffuse disease throughout the vessel  Left Circumflex   There is mild diffuse disease throughout the vessel  First Obtuse Marginal Branch   There is moderate diffuse disease throughout the vessel  Second Obtuse Marginal Branch   There is moderate diffuse disease throughout the vessel  Right Coronary Artery   There is mild diffuse disease throughout the vessel  Carotid artery ultrasound: 3/2/23    RIGHT:  There is <50% stenosis noted in the internal carotid artery  Plaque is  heterogenous/calcified and irregular  Vertebral artery flow is antegrade  There is no significant subclavian artery  disease  LEFT:  There is <50% stenosis noted in the internal carotid artery  Plaque is  heterogenous/calcified and irregular  Vertebral artery flow is antegrade   There is no significant subclavian artery  disease  I have personally reviewed pertinent films in PACS    TAVR evaluation Assessment:     5 Meter Walk Test:      Attempt 1: 6 sec   Attempt 2: 6 sec   Attempt 3: 7 sec    STS Risk Score: 1 5%    Aortic Stenosis Stage: D3    [facility]: III    KCCQ-12 completed    Assessment:  Patient Active Problem List    Diagnosis Date Noted   • [facility] 10/29/2021   • Mixed hyperlipidemia 01/07/2021   • History of colon polyps 11/12/2018   • Allergic rhinitis due to pollen 09/26/2017   • [facility] 08/23/2017   • Chronic diastolic CHF (congestive heart failure) ([place] [address] ) 07/13/2017   • Obstructive sleep apnea of adult 07/13/2017   • Severe aortic stenosis 04/04/2017   • Erectile dysfunction 02/23/2017   • Paroxysmal atrial tachycardia ([place] [address] ) 03/27/2015   • Nonocclusive coronary atherosclerosis of native coronary artery 03/26/2015   • Dyslipidemia 03/26/2015   • Class 2 severe obesity due to excess calories with serious comorbidity and body mass index (BMI) of 37 0 to 37 9 in adult [facility]) 03/26/2015   • Renovascular hypertension 10/31/2012   • Rheumatoid arthritis ([place] [address] ) 10/18/2012   • GERD without esophagitis 10/16/2012       Impression/Plan:    Brenda Hanna Sr  has symptomatic severe aortic stenosis  They have undergone testing for transcatheter aortic valve replacement  The results of these studies have been interpreted by the multidisciplinary TAVR team who have determined the patient to be Intermediate risk for open aortic valve replacement based on other pre-existing comobidities not reflected in the STS risk score  The risks, benefits, and alternatives to TAVR were discussed in detail with the patient today  They understand and wish to proceed with transfemoral transcatheter aortic valve replacement  Informed consent was obtained and a date for the intervention has been set      Iban Aiken Sr  was comfortable with our "recommendations, and their questions were answered to their satisfaction  The following preoperative instructions were provided at the conclusion of their consultation:     1  You will receive a phone call from the hospital between 2:00 PM and 8:00 PM the day prior to surgery to confirm arrival time and location  For surgery on Mondays, you will receive a call on Friday  2  Do not drink or eat anything after midnight the night before surgery  That includes no water, candy, gum, lozenges, Lifesavers, etc  We recommend you not eat any salty or fatty snack food, consume alcohol or smoke the night before surgery  3  Continue taking aspirin but only 81 mg daily  4  If you take Coumadin and/or Plavix, discontinue it 5 days before surgery  5  If you are diabetic, do not take any of your diabetic pills the morning of surgery  If you take Lantus insulin, you may take it at your regularly scheduled time the day before surgery  Do not take any other insulins the morning of surgery  6  The 2 nights before surgery, take a shower each night using the special antiseptic soap or soap sponges you received from the office or hospital  Shaan Trotteron your hair with regular shampoo and rinse completely before using the antiseptic sponges  Use the sponge to wash from your neck down, with special attention to the armpits and groin area  Do not use any other soap or cleanser on your skin  Do not use lotions, powder, deodorant or perfume of any kind on your skin after you shower  Use clean bed linens and wear clean pajamas after your shower  7  You will be prescribed Mupirocin nasal ointment  Apply to both nostrils twice a day for 5 days prior to surgery  8  Do not take a shower the morning of her surgery; you'll be given a special\" bath\" at the hospital   9  Notify the CT surgery office if you develop a cold, sore throat, cough, fever or other health issues before your surgery    10  Other medication changes included the following: " ] Stop Eliquis 5 days before surgery;     Stop Losartan and Lisinopril 3 days before surgery      SIGNATURE: JACQUIE Redmond  DATE: April 7, 2023  TIME: 12:08 PM

## 2023-04-07 NOTE — PATIENT INSTRUCTIONS
"1  You will receive a phone call from the hospital between 2:00 PM and 8:00 PM the day prior to surgery to confirm arrival time and location  For surgery on Mondays, you will receive a call on Friday  2  Do not drink or eat anything after midnight the night before surgery  That includes no water, candy, gum, lozenges, Lifesavers, etc  We recommend you not eat any salty or fatty snack foods, consume alcohol or smoke the night before surgery  3  Continue taking aspirin but only 81 mg daily  4  If you take Coumadin and/or Plavix, discontinue it 5 days before surgery  5  If you are diabetic, do not take any of your diabetic pills the morning of surgery  If you take Lantus insulin, you may take it at your regularly scheduled time the day before surgery  Do not take any other insulins the morning of surgery  6  The 2 nights before surgery, take a shower each night using the special antiseptic soap or [facility] sponges [facility] regular shampoo and rinse completely before using the antiseptic sponges  Use the sponge to wash from your neck down, with special attention to the armpits and groin area  Do not use any other soap or cleanser on your skin  Do not use lotions, powder, deodorant or perfume of any kind on your skin after you shower  Use clean bed linens and wear clean pajamas after your shower  7  You will be prescribed Mupirocin nasal ointment  Apply to both nostrils twice a day for 5 days prior to surgery  8  Do not take a shower the morning of surgery; you'll be given a special\" bath\" at the hospital   9  Notify the CT surgery office if you develop a cold, sore throat, cough, fever or other health issues before your surgery    10  Other medication changes included the following:   Stop Eliquis 5 days before surgery   Stop Losartan and Lisinopril 3 days before surgery  "

## 2023-04-07 NOTE — H&P
Pre-Op History & Physical - Cardiothoracic Surgery   Lion Mckinley Sr  78 y o  male MRN: [id number]    Physician Requesting Consult: Dr Yvonne Singer    Reason for Consult / Principal Problem: Aortic stenosis, Non-Rheumatic    History of Present Illness: Abril Monotya is a 78y o  year old male who was previously evaluated in our office by JACQUI Vega  for transcatheter aortic valve replacement  During this initial consultation, arrangements were made for the following studies to be completed: Gated CTA of the chest/abdomen/pelvis,  cardiac catheterization, dental clearance and carotid artery ultrasound  Abril Montoya now presents to review the results of these tests and obtain a second surgeon to confirm the suitability of proceeding with transcatheter aortic valve replacment  In review, Patient's PMHx is notable for AS, CAD (non-obst), Afib (Eliquis), HTN, HLD, MARTINA (no CPAP), CHF, pre-diabetes (Ac1 6 4%), morbid obesity (BMI 36 31), GERD, RA, h/o diverticulitis s/p colectomy and colon polyps      Patient with known h/o AS, identified on echo in 2020  He has been under surveillance by his cardiologist  Most recent echo demonstrates progression of his AS to severe range       Patient accompanied by his wife today  He reports worsening GONZALEZ, for example, walking a distance of ~ 100 yds on his driveway to the mailbox with an incline, he needs to stop and rest to catch his breath  He reports a decrease in activity tolerance and an increase in his chronic LE edema  He denies chest pain, lightheadedness, palpitations, PND or orthopnea  He denies h/o heart murmur or rheumatic fever ion childhood  He does not use CPAP at night       Patient denies tobacco, admits to a few beer per week and denies drug use      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  Covid vax x 2     Up to date with dental care        Past Medical History:  Past Medical History:   Diagnosis Date   • Atrial fibrillation ([facility] [address] )    • Colon polyp    • Hyperlipidemia    • Hypertension    • Nonocclusive coronary atherosclerosis of native coronary artery 03/26/2015         Past Surgical History:   Past Surgical History:   Procedure Laterality Date   • APPENDECTOMY     • BACK SURGERY  1996    disc repair   • CARDIAC CATHETERIZATION      50% lesion of mid LAD, 40% lesion of first obtuse marginal lesion  Last assessed 1/17/2018    • CARDIAC CATHETERIZATION N/A 3/13/2023    Procedure: Cardiac catheterization;  Surgeon: Ted Palacios DO;  Location: [facility]; Service: Cardiology   • CARDIAC CATHETERIZATION N/A 3/13/2023    Procedure: Cardiac Coronary Angiogram;  Surgeon: Ted Palacios DO;  Location: [facility]; Service: Cardiology   • CHOLECYSTECTOMY     • COLON SURGERY      colon ressection   • COLONOSCOPY N/A 01/14/2019    Procedure: COLONOSCOPY;  Surgeon: Frank Harper MD;  Location: [facility];  Service: Colorectal   • COLONOSCOPY  08/08/2022   • CYSTOSCOPY      Diagnostic    • HEMORRHOID SURGERY  08/2011   • TONSILLECTOMY           Family History:  Family History   Problem Relation Age of Onset   • Diabetes Mother    • Stroke Mother          Social History:    Social History     Substance and Sexual Activity   Alcohol Use Yes   • Alcohol/week: 4 0 standard drinks   • Types: 4 Cans of beer per week    Comment: daily     Social History     Substance and Sexual Activity   Drug Use No     Social History     Tobacco Use   Smoking Status Former   Smokeless Tobacco Former       Home Medications:   Prior to Admission medications    Medication Sig Start Date End Date Taking? Authorizing Provider   apixaban (Eliquis) 5 mg Take 1 tablet (5 mg total) by mouth 2 (two) times a day 3/13/23   Dennie Brand, CRNP   ezetimibe (ZETIA) 10 mg tablet Take 1 tablet (10 mg total) by mouth daily 11/25/22   Almaz Herman DO   folic acid (FOLVITE) 1 mg tablet TAKE (1) TABLET BY MOUTH DAILY   12/2/22 Marcos Lyles,    furosemide (LASIX) 40 mg tablet Take 1 tablet (40 mg total) by mouth 2 (two) times a day Do not start before March 14, 2023  3/14/23   JACQUIE Lan   lisinopril (ZESTRIL) 20 mg tablet Take 1 tablet (20 mg total) by mouth 2 (two) times a day Do not start before March 14, 2023  3/14/23   JACQUIE Lan   losartan (COZAAR) 100 MG tablet Take 1 tablet (100 mg total) by mouth daily Do not start before March 14, 2023  3/14/23   JACQUIE Lan   metoprolol tartrate (LOPRESSOR) 50 mg tablet Take 1 tablet (50 mg total) by mouth every 12 (twelve) hours 12/2/22   Tigist Rod PA-C   NIFEdipine (PROCARDIA XL) 90 mg 24 hr tablet TAKE 1 TABLET DAILY  12/2/22   [facility]   omeprazole (PriLOSEC) 20 mg delayed release capsule TAKE (1) CAPSULE DAILY  12/2/22   [facility], DO   predniSONE 5 mg tablet TAKE ONE TABLET DAILY  12/2/22   [facility], DO   rosuvastatin (CRESTOR) 20 MG tablet TAKE ONE TABLET BY MOUTH DAILY   12/2/22   [facility], DO       Allergies:  No Known Allergies    Review of Systems:  Review of Systems - History obtained from chart review and the patient  General ROS: positive for  - fatigue and change inactivity tolerance  negative for - chills, fever, sleep disturbance or weight gain  Psychological ROS: negative  Ophthalmic ROS: negative  ENT ROS: negative  Allergy and Immunology ROS: negative  Hematological and Lymphatic ROS: negative  Endocrine ROS: negative  Breast ROS: negative  Respiratory ROS: no cough, shortness of breath, or wheezing  Cardiovascular ROS: positive for - dyspnea on exertion, edema, irregular heartbeat and murmur  negative for - chest pain, loss of consciousness, orthopnea, palpitations, paroxysmal nocturnal dyspnea or rapid heart rate  Gastrointestinal ROS: no abdominal pain, change in bowel habits, or black or bloody stools  Genito-Urinary ROS: no dysuria, trouble voiding, or hematuria  Musculoskeletal ROS: positive for - back "pain and arthralgias  Neurological ROS: no TIA or stroke symptoms  Dermatological ROS: negative    Vital Signs:     Vitals:    04/07/23 1137   BP: 127/68   BP Location: Left arm   Patient Position: Sitting   Cuff Size: Large   Pulse: 89   Temp: 97 8 °F (36 6 °C)   TempSrc: Tympanic   SpO2: 97%   Weight: 112 kg (245 lb 14 4 oz)   Height: 5' 9\" (1 753 m)       Physical Exam:    General: Alert, oriented, obese, no acute distress  HEENT/NECK:  PERRLA  No jugular venous distention  Cardiac:Irregular rhythm, III/VI harsh systolic murmur RUSB  Carotids: 1+ pulses, no bruits   Pulmonary:  Breath sounds clear bilaterally  Abdomen:  Non-tender, Non-distended  Positive bowel sounds  Upper extremities: 2+ radial pulses; brisk capillary refill  Lower extremities: Extremities warm/dry  PT/DP pulses 2+ bilaterally  2+ edema B/L  Neuro: Alert and oriented X 3  Sensation is grossly intact  No focal deficits  Musculoskeletal: MAEE, stable gait  Skin: Warm/Dry, without rashes or lesions      Lab Results:   Lab Results   Component Value Date    WBC 10 75 (H) 03/01/2023    HGB 15 0 03/01/2023    HCT 45 6 03/01/2023    MCV 86 03/01/2023     03/01/2023     Lab Results   Component Value Date     12/31/2015    SODIUM 138 03/01/2023    K 3 2 (L) 03/01/2023     03/01/2023    CO2 27 03/01/2023    ANIONGAP 10 12/31/2015    AGAP 11 03/01/2023    BUN 17 03/01/2023    CREATININE 0 76 03/01/2023    GLUC 121 04/20/2021    GLUF 123 (H) 03/01/2023    CALCIUM 8 9 03/01/2023    AST 24 03/01/2023    ALT 25 03/01/2023    ALKPHOS 42 03/01/2023    PROT 7 4 12/31/2015    TP 7 1 03/01/2023    BILITOT 1 07 (H) 12/31/2015    TBILI 1 59 (H) 03/01/2023    EGFR 89 03/01/2023     Lab Results   Component Value Date    HGBA1C 6 4 (H) 01/10/2023     Lab Results   Component Value Date    CKTOTAL 145 06/20/2019    CKMB 1 4 05/24/2018    CKMBINDEX <1 0 05/24/2018    TROPONINI <0 04 03/12/2015       Imaging Studies:     Echocardiogram: " 2/19/23    •  Left Ventricle: Left ventricular cavity size is normal  Wall thickness is increased  There is mild to moderate concentric hypertrophy  The left ventricular ejection fraction is 65%  Systolic function is normal  Wall motion is normal   •  Left Atrium: The atrium is moderately dilated  •  Right Atrium: The atrium is moderately dilated  •  Aortic Valve: The aortic valve is trileaflet  The leaflets are not thickened  The leaflets are moderately calcified  There is severely reduced mobility  There is severe stenosis  Peak gradient is 41 mmHg, mean gradient is 26 mmHg  TY 0 9 cm2   •  Mitral Valve: There is mild regurgitation  •  Tricuspid Valve: There is mild regurgitation  •  Pulmonic Valve: There is mild regurgitation      Findings    Left Ventricle Left ventricular cavity size is normal  Wall thickness is increased  There is mild to moderate concentric hypertrophy  The left ventricular ejection fraction is 65%  Systolic function is normal   Wall motion is normal    Right Ventricle Right ventricular cavity size is normal  Systolic function is normal  Wall thickness is normal    Left Atrium The atrium is moderately dilated  Right Atrium The atrium is moderately dilated  Aortic Valve The aortic valve is trileaflet  The leaflets are not thickened  The leaflets are moderately calcified  There is severely reduced mobility  There is no evidence of regurgitation  There is severe stenosis  Peak gradient is 41 mmHg, mean gradient is 26 mmHg  TY 0 9 cm2  Mitral Valve Mitral valve structure is normal  There is mild regurgitation  There is no evidence of stenosis  Tricuspid Valve Tricuspid valve structure is normal  There is mild regurgitation  There is no evidence of stenosis  The estimated right ventricular systolic pressure is 83 01 mmHg  Pulmonic Valve Pulmonic valve structure is normal  There is mild regurgitation  There is no evidence of stenosis     Ascending Aorta The aortic root is normal in size    IVC/SVC The right atrial pressure is estimated at 8 0 mmHg  The inferior vena cava is normal in size  Pericardium There is no pericardial effusion  The pericardium is normal in appearance       Left Ventricle Measurements    Function/Volumes   LVOT stroke volume 64 62 cm3         LVOT stroke volume index 29 8 ml/m2         Dimensions   LVIDd 5 1 cm         LVIDS 3 1 cm         IVSd 1 5 cm         LVPWd 1 5 cm         LVOT area 3 14 cm2         FS 39 %          Report Measurements   AV LVOT peak gradient 3 mmHg              Interventricular Septum Measurements    Shunt Ratio   LVOT peak VTI 20 58 cm         LVOT peak sergey 0 88 m/s              Left Atrium Measurements    Dimensions   LA size 5 3 cm               Atrial Septum Measurements    Shunt Ratio   LVOT peak VTI 20 58 cm         LVOT peak sregey 0 88 m/s               Aortic Valve Measurements    Stenosis   Aortic valve peak velocity 3 21 m/s         LVOT peak sergey 0 88 m/s         Ao VTI 75 16 cm         LVOT peak VTI 20 58 cm         AV mean gradient 26 mmHg         LVOT mn grad 2 mmHg         AV peak gradient 41 mmHg         AV LVOT peak gradient 3 mmHg         Area/Dimensions   DVI 0 27          AV valve area 0 86 cm2         AV area by cont VTI 0 9 cm2         AV area peak sergey 0 9 cm2         LVOT diameter 2 cm         LVOT area 3 14 cm2               Tricuspid Valve Measurements    RVSP Parameters   TR Peak Sergey 3 1 m/s         Est  RA pres 8 mmHg         Triscuspid Valve Regurgitation Peak Gradient 39 mmHg         Right Ventricular Peak Systolic Pressure 47 mmHg               Aorta Measurements    Aortic Dimensions   Ao root 3 2 cm               IVC/SVC Measurements    IVC/SVC   Est  RA pres 8 mmHg                Gated CTA of the chest/abdomen/pelvis: 3/2/23    VASCULAR STRUCTURES:       Annulus: diameter 29 3 x 22 9 mm      area: 567 0 sq mm    Annulus to LCA: 14 6 mm    Annulus to RCA: 19 1 mm    Minimal diameter right iliofemoral segment: 8 0 mm Minimal diameter left iliofemoral segment: 4 5 mm     The ascending aorta is nonaneurysmal measuring 35 mm with mild atherosclerosis  There is a type I aortic arch with bovine branching anatomy and no stenosis in the visualized great vessels  The aortic arch is nonaneurysmal with mild atherosclerosis  The   descending thoracic aorta is nonaneurysmal with mild atherosclerosis      The abdominal aorta is nonaneurysmal with mild atherosclerosis  Celiac, superior mesenteric, inferior mesenteric, and bilateral renal arteries are patent  Bilateral iliofemoral arteries are nonaneurysmal with mild atherosclerosis      Cardiac findings: There is mild calcification of the aortic valve  The left ventricle is normal   The ventricular septum is normal   No prior valvular surgery  No prior bypass surgery  No pericardial effusion  No cardiac mass or thrombus  Coronary artery and mitral   annulus calcifications  ECG: 3/13/23    Atrial fibrillation with a competing junctional pacemaker  Nonspecific T wave abnormality  Prolonged QT    Cardiac catheterization: 3/13/23    Left Anterior Descending   There is moderate diffuse disease throughout the vessel  Left Circumflex   There is mild diffuse disease throughout the vessel  First Obtuse Marginal Branch   There is moderate diffuse disease throughout the vessel  Second Obtuse Marginal Branch   There is moderate diffuse disease throughout the vessel  Right Coronary Artery   There is mild diffuse disease throughout the vessel  Carotid artery ultrasound: 3/2/23    RIGHT:  There is <50% stenosis noted in the internal carotid artery  Plaque is  heterogenous/calcified and irregular  Vertebral artery flow is antegrade  There is no significant subclavian artery  disease  LEFT:  There is <50% stenosis noted in the internal carotid artery  Plaque is  heterogenous/calcified and irregular  Vertebral artery flow is antegrade   There is no significant subclavian artery  disease  I have personally reviewed pertinent films in PACS    TAVR evaluation Assessment:     5 Meter Walk Test:      Attempt 1: 6 sec   Attempt 2: 6 sec   Attempt 3: 7 sec    STS Risk Score: 1 5%    Aortic Stenosis Stage: D3    [facility]: III    KCCQ-12 completed    Assessment:  Patient Active Problem List    Diagnosis Date Noted   • Hypertensive heart disease with congestive [facility] failure [facility] ) 10/29/2021   • Mixed hyperlipidemia 01/07/2021   • History of colon polyps 11/12/2018   • Allergic rhinitis due to pollen 09/26/2017   • Paroxysmal atrial fibrillation ([facility] ) 08/23/2017   • Chronic diastolic CHF (congestive heart failure) ([place] [address] ) 07/13/2017   • Obstructive sleep apnea of adult 07/13/2017   • Severe aortic stenosis 04/04/2017   • Erectile dysfunction 02/23/2017   • Paroxysmal atrial tachycardia ([place] [address] ) 03/27/2015   • Nonocclusive coronary atherosclerosis of native coronary artery 03/26/2015   • Dyslipidemia 03/26/2015   • Class 2 severe obesity due to excess calories with serious comorbidity and body mass index (BMI) of 37 0 to 37 9 in adult [facility]) 03/26/2015   • Renovascular hypertension 10/31/2012   • Rheumatoid arthritis ([place] [address] ) 10/18/2012   • GERD without esophagitis 10/16/2012       Impression/Plan:    Asif Hanna Sr  has symptomatic severe aortic stenosis  They have undergone testing for transcatheter aortic valve replacement  The results of these studies have been interpreted by the multidisciplinary TAVR team who have determined the patient to be Intermediate risk for open aortic valve replacement based on other pre-existing comobidities not reflected in the STS risk score  The risks, benefits, and alternatives to TAVR were discussed in detail with the patient today  They understand and wish to proceed with transfemoral transcatheter aortic valve replacement  Informed consent was obtained and a date for the intervention has been set      Pat Caban Sr  was comfortable with our "recommendations, and their questions were answered to their satisfaction  The following preoperative instructions were provided at the conclusion of their consultation:     1  You will receive a phone call from the hospital between 2:00 PM and 8:00 PM the day prior to surgery to confirm arrival time and location  For surgery on Mondays, you will receive a call on Friday  2  Do not drink or eat anything after midnight the night before surgery  That includes no water, candy, gum, lozenges, Lifesavers, etc  We recommend you not eat any salty or fatty snack food, consume alcohol or smoke the night before surgery  3  Continue taking aspirin but only 81 mg daily  4  If you take Coumadin and/or Plavix, discontinue it 5 days before surgery  5  If you are diabetic, do not take any of your diabetic pills the morning of surgery  If you take Lantus insulin, you may take it at your regularly scheduled time the day before surgery  Do not take any other insulins the morning of surgery  6  The 2 nights before surgery, take a shower each night using the special antiseptic soap or soap sponges you received from the office or hospital  Tamanna Fogo your hair with regular shampoo and rinse completely before using the antiseptic sponges  Use the sponge to wash from your neck down, with special attention to the armpits and groin area  Do not use any other soap or cleanser on your skin  Do not use lotions, powder, deodorant or perfume of any kind on your skin after you shower  Use clean bed linens and wear clean pajamas after your shower  7  You will be prescribed Mupirocin nasal ointment  Apply to both nostrils twice a day for 5 days prior to surgery  8  Do not take a shower the morning of her surgery; you'll be given a special\" bath\" at the hospital   9  Notify the CT surgery office if you develop a cold, sore throat, cough, fever or other health issues before your surgery    10  Other medication changes included the following: " ] Stop Eliquis 5 days before surgery;     Stop Losartan and Lisinopril 3 days before surgery      SIGNATURE: JACQUIE Montes  DATE: April 7, 2023  TIME: 12:08 PM

## 2023-04-18 DIAGNOSIS — I35.0 SEVERE AORTIC STENOSIS: Primary | ICD-10-CM

## 2023-04-18 DIAGNOSIS — Z95.2 S/P TAVR (TRANSCATHETER AORTIC VALVE REPLACEMENT): ICD-10-CM

## 2023-04-18 PROBLEM — Z78.9 DAILY CONSUMPTION OF ALCOHOL: Status: ACTIVE | Noted: 2023-04-18

## 2023-04-25 ENCOUNTER — TELEPHONE (OUTPATIENT)
Dept: CARDIAC SURGERY | Facility: CLINIC | Age: 78
End: 2023-04-25

## 2023-04-25 ENCOUNTER — OFFICE VISIT (OUTPATIENT)
Dept: FAMILY MEDICINE CLINIC | Facility: CLINIC | Age: 78
End: 2023-04-25

## 2023-04-25 VITALS
SYSTOLIC BLOOD PRESSURE: 128 MMHG | BODY MASS INDEX: 38.14 KG/M2 | TEMPERATURE: 97.5 F | HEART RATE: 78 BPM | RESPIRATION RATE: 18 BRPM | HEIGHT: 67 IN | DIASTOLIC BLOOD PRESSURE: 70 MMHG | WEIGHT: 243 LBS

## 2023-04-25 DIAGNOSIS — Z95.2 S/P TAVR (TRANSCATHETER AORTIC VALVE REPLACEMENT): Primary | ICD-10-CM

## 2023-04-25 DIAGNOSIS — I50.32 CHRONIC DIASTOLIC CHF (CONGESTIVE HEART FAILURE) (HCC): ICD-10-CM

## 2023-04-25 DIAGNOSIS — I48.0 PAROXYSMAL ATRIAL FIBRILLATION (HCC): ICD-10-CM

## 2023-04-25 NOTE — PROGRESS NOTES
Name: Vivian Rockwell      : 1945      MRN: [id number]  Encounter Provider: Barney Fuentes DO  Encounter Date: 2023   Encounter department: [address] [place] [address] Road     1  S/P TAVR (transcatheter aortic valve replacement)  Pt doing well at home  Walking program encouraged as pt does not feel he needs cardiac rehab  He has surgery followup next week and is following lifting restrictions and driving restriction      2  Paroxysmal atrial fibrillation ([facility] [address] )  Stable Has cardiology appt next week     3  Chronic diastolic CHF (congestive heart failure) ([facility] [address] )  He notes improved sob and less fatigue since procedure  He will walk for exercise         Depression Screening and Follow-up Plan: Patient was screened for depression during today's encounter  They screened negative with a PHQ-2 score of 0  Rto as scheduled  Subjective      HPI   Pt doing well overall since TAVR He notes less sob, resolution of leg cramps and is less fatigued No chest pain No dizziness His appetite is normal Bowels are better - he had some constipation first 2 days but resolved He has been walking and now can get to the mailbox without stopping which is better than preop He has resolving bruising right groin but no pain Not taking any rx for pain No fever or chills No n/v He has surgery appt mid May and has labs ordered He has cardio appt next week He did get a call for cardiac rehab but deferred   TCM Call     Date and time call was made  2023 12:24 PM    Hospital care reviewed  Records reviewed    Patient was hospitialized at  [facility]    Date of Admission  23    Date of discharge  23    Diagnosis  aortic stenosis, aortic valve replacement    Disposition  Home      TCM Call     Scheduled for follow up? Yes    Patients specialists  Cardiologist          Review of Systems   Constitutional: Negative for chills and fever  HENT: Negative  Eyes: Negative for visual disturbance  "  Respiratory: Negative for cough and shortness of breath  Cardiovascular: Negative for chest pain, palpitations and leg swelling  Gastrointestinal: Negative for abdominal distention and abdominal pain  Genitourinary: Negative  Musculoskeletal: Positive for arthralgias  Neurological: Negative for dizziness and headaches  Psychiatric/Behavioral: Negative for sleep disturbance  The patient is not nervous/anxious  Current Outpatient Medications on File Prior to Visit   Medication Sig   • acetaminophen (TYLENOL) 650 mg CR tablet Take 1 tablet (650 mg total) by mouth every 8 (eight) hours as needed for mild pain   • apixaban (Eliquis) 5 mg Take 1 tablet (5 mg total) by mouth 2 (two) times a day   • aspirin 81 mg chewable tablet Chew 1 tablet (81 mg total) daily Do not start before April 20, 2023  • ezetimibe (ZETIA) 10 mg tablet Take 1 tablet (10 mg total) by mouth daily   • folic acid (FOLVITE) 1 mg tablet TAKE (1) TABLET BY MOUTH DAILY  • furosemide (LASIX) 40 mg tablet Take 1 tablet (40 mg total) by mouth 2 (two) times a day Do not start before March 14, 2023  • lisinopril (ZESTRIL) 20 mg tablet Take 1 tablet (20 mg total) by mouth 2 (two) times a day Do not start before March 14, 2023  • losartan (COZAAR) 100 MG tablet Take 1 tablet (100 mg total) by mouth daily Do not start before March 14, 2023  • metoprolol tartrate (LOPRESSOR) 50 mg tablet Take 1 tablet (50 mg total) by mouth every 12 (twelve) hours   • NIFEdipine (PROCARDIA XL) 90 mg 24 hr tablet TAKE 1 TABLET DAILY  • omeprazole (PriLOSEC) 20 mg delayed release capsule TAKE (1) CAPSULE DAILY  • predniSONE 5 mg tablet TAKE ONE TABLET DAILY  • rosuvastatin (CRESTOR) 20 MG tablet TAKE ONE TABLET BY MOUTH DAILY  Objective     /70   Pulse 78   Temp 97 5 °F (36 4 °C) (Temporal)   Resp 18   Ht 5' 6 93\" (1 7 m)   Wt 110 kg (243 lb)   BMI 38 14 kg/m²     Physical Exam  Vitals reviewed     Constitutional:       " General: He is not in acute distress  Appearance: Normal appearance  He is not ill-appearing, toxic-appearing or diaphoretic  HENT:      Head: Normocephalic and atraumatic  Right Ear: External ear normal       Left Ear: External ear normal       Nose: Nose normal       Mouth/Throat:      Mouth: Mucous membranes are moist    Eyes:      General: No scleral icterus  Extraocular Movements: Extraocular movements intact  Conjunctiva/sclera: Conjunctivae normal       Pupils: Pupils are equal, round, and reactive to light  Cardiovascular:      Rate and Rhythm: Normal rate and regular rhythm  Pulses: Normal pulses  Heart sounds: Normal heart sounds  No murmur heard  Pulmonary:      Effort: Pulmonary effort is normal  No respiratory distress  Breath sounds: Normal breath sounds  No wheezing  Abdominal:      General: Bowel sounds are normal  There is no distension  Palpations: Abdomen is soft  Tenderness: There is no abdominal tenderness  Musculoskeletal:      Cervical back: Normal range of motion and neck supple  Right lower leg: No edema  Left lower leg: No edema  Skin:     General: Skin is warm and dry  Coloration: Skin is not jaundiced or pale  Neurological:      General: No focal deficit present  Mental Status: He is alert and oriented to person, place, and time  Mental status is at baseline  Psychiatric:         Mood and Affect: Mood normal          Behavior: Behavior normal          Thought Content:  Thought content normal          Judgment: Judgment normal        Jacob Lynn DO

## 2023-04-25 NOTE — TELEPHONE ENCOUNTER
Called patient to perform routine post-op follow-up after TAVR procedure   No answer, left voicemail to call office at [phone]

## 2023-04-27 ENCOUNTER — TELEPHONE (OUTPATIENT)
Dept: CARDIAC SURGERY | Facility: CLINIC | Age: 78
End: 2023-04-27

## 2023-04-27 DIAGNOSIS — I35.0 SEVERE AORTIC STENOSIS: Primary | ICD-10-CM

## 2023-04-27 DIAGNOSIS — Z95.2 S/P TAVR (TRANSCATHETER AORTIC VALVE REPLACEMENT): ICD-10-CM

## 2023-04-27 NOTE — TELEPHONE ENCOUNTER
Post-op call  S/p TF TAVR 4/18   -Left voicemail for post-op check  Will see for appt on 5/19/2023 as have attempted 2 post-op cals from our office  Left office contact on voicemail

## 2023-04-28 NOTE — PROGRESS NOTES
Cardiology Follow Up    [address] [facility]   1945  [id number]  [address] [facility] [address] [place] i[place]ow  [place]  [place] [address]  [phone]    1  Nonrheumatic aortic valve stenosis        2  S/P TAVR (transcatheter aortic valve replacement)        3  Nonocclusive coronary atherosclerosis of native coronary artery        4  Persistent atrial fibrillation (HCC)  POCT ECG    Holter monitor      5  Chronic diastolic (congestive) heart failure ([facility])        6  Essential hypertension        7  Dyslipidemia        8  Obstructive sleep apnea            Discussion/Summary:  Aortic stenosis:   Was noted to be severe on echo after his last office visit prompting CT surgery evaluation  He underwent TAVR on 4/18/23  Post op course was uncomplicated  He symptomatically feels improved since TAVR with increased endurance/decreased shortness of breath  Has CT surgery appointment and 1 month follow up echo on 5/19  Continue aspirin  No plavix in the setting of Eliquis use  Cardiac rehab encouraged - patient declined       Persistent atrial fibrillation:   Rates are elevated in the office today  Will check a 48 hour Holter monitor to assess average rate control  Continue metoprolol tartrate 50 mg BID for now  Continue anticoagulation with Eliquis 5 mg BID  He has severe obstructive sleep apnea and is unable to tolerate a CPAP  Advised him to cut back on his alcohol intake  Chronic diastolic congestive heart failure:   Appears to be euvolemic on exam on lasix 40 mg BID  Continue the same regimen  Non-obstructive coronary artery disease:  Mild-moderate non-obstructive disease noted on LHC  On aspirin and statin       Hypertension:   Controlled on present regimen     Dyslipidemia:   Lipids are at goal on current statin regimen      He will return to the office in 3-4 months with Dr Dileep Tejeda or sooner if necessary    He will call with any concerns  Interval History:   Jn Beard  is a 78 y o  male with severe aortic stenosis s/p recent TAVR 4/18/23,  persistent atrial fibrillation on anticoagulation, nonobstructive CAD, chronic diastolic congestive heart failure, hypertension, dyslipidemia, obesity who presents to the office today for follow up  He underwent TAVR with an Clark Carina 26 mm bioprosthetic valve on 4/18/23  Post-op course was uncomplicated  Pre TAVR he had a repeat LHC which showed mild-moderate non-obstructive disease  Overall he reports feeling much better since his TAVR  He can walk further distances and is less short of breath  He denies any exertional chest pain/pressure/discomfort  He denies worsening lower extremity edema  His weight has been stable between 244 and 246 pounds on his home scale  He denies lightness, dizziness, or syncope  He denies palpitations  He has been taking all medications as prescribed  He denies any bleeding consequences or falls on anticoagulation  He does not smoke  He drinks about a 6 pack of beer nightly       Medical Problems     Problem List     Severe aortic stenosis    Chronic diastolic CHF (congestive heart failure) (HCC)    Wt Readings from Last 3 Encounters:   05/01/23 112 kg (247 lb 9 6 oz)   04/25/23 110 kg (243 lb)   04/19/23 110 kg (242 lb)                 Nonocclusive coronary atherosclerosis of native coronary artery    Dyslipidemia    Obstructive sleep apnea of adult    Paroxysmal atrial fibrillation (HCC)    Paroxysmal atrial tachycardia (HCC)    Allergic rhinitis due to pollen    Erectile dysfunction    GERD without esophagitis    Class 2 severe obesity due to excess calories with serious comorbidity and body mass index (BMI) of 37 0 to 37 9 in adult [facility])    Renovascular hypertension    Rheumatoid arthritis ([facility] [address] )    History of colon polyps    Mixed hyperlipidemia    Hypertensive heart disease with congestive heart failure ([facility] [address] )    Overview Signed 10/29/2021 6:28 AM by Danica Ovalle     Added per ICD-10 guidelines; provider accepted         Wt Readings from Last 3 Encounters:   05/01/23 112 kg (247 lb 9 6 oz)   04/25/23 110 kg (243 lb)   04/19/23 110 kg (242 lb)                 Daily consumption of alcohol    S/P TAVR (transcatheter aortic valve replacement)        Past Medical History:   Diagnosis Date    Atrial fibrillation (HCC)     Colon polyp     Hyperlipidemia     Hypertension     Nonocclusive coronary atherosclerosis of native coronary artery 03/26/2015     Social History     Socioeconomic History    Marital status: /Civil Union     Spouse name: Not on file    Number of children: Not on file    Years of education: Not on file    Highest education level: Not on file   Occupational History    Not on file   Tobacco Use    Smoking status: Former    Smokeless tobacco: Former   Vaping Use    Vaping Use: Never used   Substance and Sexual Activity    Alcohol use: Yes     Alcohol/week: 4 0 standard drinks     Types: 4 Cans of beer per week     Comment: daily    Drug use: No    Sexual activity: Not on file   Other Topics Concern    Not on file   Social History Narrative    Dental care, regularly    Good dental hygiene    No advance directives    No caffeine use    Uses safety equipment - Seatbelts      Social Determinants of Health     Financial Resource Strain: Low Risk     Difficulty of Paying Living Expenses: Not very hard   Food Insecurity: No Food Insecurity    Worried About Running Out of Food in the Last Year: Never true    Moon of Food in the Last Year: Never true   Transportation Needs: No Transportation Needs    Lack of Transportation (Medical): No    Lack of Transportation (Non-Medical):  No   Physical Activity: Not on file   Stress: Not on file   Social Connections: Not on file   Intimate Partner Violence: Not on file   Housing Stability: Low Risk     Unable to Pay for Housing in the Last Year: No    Number of Places Lived in the Last Year: 1    Unstable Housing in the Last Year: No      Family History   Problem Relation Age of Onset    Diabetes Mother     Stroke Mother      Past Surgical History:   Procedure Laterality Date    APPENDECTOMY      BACK SURGERY  1996    disc repair    CARDIAC CATHETERIZATION      50% lesion of mid LAD, 40% lesion of first obtuse marginal lesion  Last assessed 1/17/2018     CARDIAC CATHETERIZATION N/A 3/13/2023    Procedure: Cardiac catheterization;  Surgeon: Lupe Vargas DO;  [facility]; Service: Cardiology    CARDIAC CATHETERIZATION N/A 3/13/2023    Procedure: Cardiac Coronary Angiogram;  Surgeon: Lupe Vargas DO;  [facility];   Service: Cardiology    CARDIAC CATHETERIZATION N/A 4/18/2023    Procedure: Cardiac tavr;  Surgeon: Elton Alfonso DO;  Location[facility] MAIN [facility];  Service: Cardiology    CHOLECYSTECTOMY      COLON SURGERY      colon ressection    COLONOSCOPY N/A 01/14/2019    Procedure: COLONOSCOPY;  Surgeon: Toshia Echeverria MD;  Location: [facility] MA[facility];  Service: Colorectal    COLONOSCOPY  08/08/2022    CYSTOSCOPY      Diagnostic     HEMORRHOID SURGERY  08/2011    FL REPLACE AORTIC VALVE OPENFEMORAL ARTERY APPROACH N/A 4/18/2023    Procedure: REPLACEMENT AORTIC VALVE TRANSCATHETER (TAVR) TRANSFEMORAL W/ 26MM BROWN VANESA S3 ULTRA VALVE(ACCESS ON RIGHT) JC;  Surgeon: Harshil Parker MD;  Location[facility] MA[facility];  Service: Cardiac Surgery    TONSILLECTOMY         Current Outpatient Medications:     acetaminophen (TYLENOL) 650 mg CR tablet, Take 1 tablet (650 mg total) by mouth every 8 (eight) hours as needed for mild pain, Disp: 30 tablet, Rfl: 0    apixaban (Eliquis) 5 mg, Take 1 tablet (5 mg total) by mouth 2 (two) times a day, Disp: 60 tablet, Rfl: 0    aspirin 81 mg chewable tablet, Chew 1 tablet (81 mg total) daily Do not start before April 20, 2023 , Disp: 30 tablet, Rfl: 2    ezetimibe (ZETIA) 10 mg tablet, Take 1 tablet (10 mg total) by mouth daily, Disp: 30 tablet, Rfl: 5    folic acid (FOLVITE) 1 mg tablet, TAKE (1) TABLET BY MOUTH DAILY  , Disp: 30 tablet, Rfl: 4    furosemide (LASIX) 40 mg tablet, Take 1 tablet (40 mg total) by mouth 2 (two) times a day Do not start before March 14, 2023 , Disp: 60 tablet, Rfl: 0    lisinopril (ZESTRIL) 20 mg tablet, Take 1 tablet (20 mg total) by mouth 2 (two) times a day Do not start before March 14, 2023 , Disp: 60 tablet, Rfl: 0    losartan (COZAAR) 100 MG tablet, Take 1 tablet (100 mg total) by mouth daily Do not start before March 14, 2023 , Disp: 30 tablet, Rfl: 0    metoprolol tartrate (LOPRESSOR) 50 mg tablet, Take 1 tablet (50 mg total) by mouth every 12 (twelve) hours, Disp: 60 tablet, Rfl: 4    NIFEdipine (PROCARDIA XL) 90 mg 24 hr tablet, TAKE 1 TABLET DAILY  , Disp: 30 tablet, Rfl: 4    omeprazole (PriLOSEC) 20 mg delayed release capsule, TAKE (1) CAPSULE DAILY  , Disp: 30 capsule, Rfl: 4    predniSONE 5 mg tablet, TAKE ONE TABLET DAILY  , Disp: 30 tablet, Rfl: 4    rosuvastatin (CRESTOR) 20 MG tablet, TAKE ONE TABLET BY MOUTH DAILY  , Disp: 30 tablet, Rfl: 4  No Known Allergies    Labs:     Chemistry        Component Value Date/Time     12/31/2015 0705    K 3 6 04/19/2023 0446    K 4 3 12/31/2015 0705     04/19/2023 0446     12/31/2015 0705    CO2 23 04/19/2023 0446    CO2 25 04/18/2023 1237    BUN 14 04/19/2023 0446    BUN 14 12/31/2015 0705    CREATININE 0 89 04/19/2023 0446    CREATININE 0 83 12/31/2015 0705        Component Value Date/Time    CALCIUM 8 0 (L) 04/19/2023 0446    CALCIUM 8 6 12/31/2015 0705    ALKPHOS 45 04/12/2023 0955    ALKPHOS 51 12/31/2015 0705    AST 36 04/12/2023 0955    AST 29 12/31/2015 0705    ALT 35 04/12/2023 0955    ALT 48 12/31/2015 0705    BILITOT 1 07 (H) 12/31/2015 0705            Lab Results   Component Value Date    CHOL 213 12/31/2015    CHOL 226 03/12/2015     Lab Results   Component Value Date    HDL 98 2022    HDL 82 2020    HDL 79 (H) 2019     Lab Results   Component Value Date    LDLCALC 39 2022    LDLCALC 54 2020    LDLCALC 42 2019     Lab Results   Component Value Date    TRIG 59 2022    TRIG 102 2020    TRIG 54 2019     No results found for: CHOLHDL    Imaging: XR chest portable    Result Date: 2023  Narrative: CHEST INDICATION:   Post TAVR  COMPARISON:  CXR 2023 and chest CT 3/2/2023  EXAM PERFORMED/VIEWS:  AP PORTABLE  FINDINGS: Right jugular catheter in SVC  Cardiomediastinal silhouette appears unremarkable  Post TAVR, not well seen  Lungs clear  No effusion or pneumothorax  Osseous structures within normal limits for age  Impression: No acute cardiopulmonary disease  Post TAVR  Workstation performed: [id number]     Cardiac catheterization    Result Date: 2023  Narrative: OPERATIVE REPORT PATIENT NAME: Juaquin Walters Sr   :  1945 MRN: [id number] Pt Location: [facility] OR ROOM 02 SURGERY DATE: 2023 Surgeon(s) and Role: Panel 1:    * Aaron Quintana MD - Primary    * [facility] [place] - Assisting Panel 2:    * Mihir Mayer DO - Primary Co-Surgeons: Adan García DO; Aaron Quintana MD PREOPERATIVE DIAGNOSIS Symptomatic severe aortic stenosis  POSTOPERATIVE DIAGNOSIS Symptomatic severe aortic stenosis  PROCEDURE Transcatheter aortic valve replacement with a 26 mm Clark VANESA 3 ULTRA RESILIA bioprosthetic valve via a percutaneous left transfemoral approach  ANESTHESIA Dr Rob Dey, general endotracheal anesthesia with transesophageal echocardiogram guidance  After informed consent was obtained, patient brought to hybrid operating room in fasting state  Time out was performed  Using modified seldinger technique sheaths were placed in the right common  femoral artery and vein respectively  The lright common  femoral artery was also used for placement of delivery sheath   The vessel was accessed using modified seldinger technique  Using fluoroscopy a temporary wire was advanced into RV apex through transfemoral sheath  The coplanar view was obtained using pigtail catheter placed in ascending aorta with subsequent ascending aortography  The TAVR delivery sheath was ulltimately advanced over a stiff wire  Next the 26 mm Clark VANESA 3 ULTRA valve was implanted using rapid pacing with fluoro and JC guidance  There was no significant paravalvular leak appreciated post implant  The sheaths were removed and hemostasis obtained by manual compression of the venous sheath  The TAVR delivery sheath was removed and percutaneous closure was obtained using Preclose technique with two Proglide devices deployed pre sheath insertion  Patient left the hybrid operating room in stable condition  Impression  Successful 26mm Clark VANESA 3 ULTRA RESILIA transcatheter aortic valve replacement  SIGNATURE: Rochelle Turcios DO DATE: April 18, 2023 TIME: 12:38 PM NOTE: A cardiac surgeon as co-surgeon was required as is a CMS requirement as well as needed for conventional open (non catheter based) surgical skills should become necessary  JC Anesthesia    Result Date: 4/18/2023  Narrative: Jackie Quintana CRNA     4/18/2023 12:26 PM Procedure Performed: JC Anesthesia Start Time:  4/18/2023 11:52 AM Preanesthesia Checklist Patient identified, IV assessed, risks and benefits discussed, monitors and equipment assessed, procedure being performed at surgeon's request and anesthesia consent obtained  Procedure Diagnostic Indications for JC:  assessment of ascending aorta, assessment of surgical repair, hemodynamic monitoring and suspected pericardial effusion  Type of JC: interventional JC with real time guidance of intracardiac procedure  Images Saved: ultrasound permanent image saved  Location performed: OR  Intubated  Heart visualized  Insertion of JC Probe:  Easy   Probe Type: Epiaortic and multiplane  Modalities:  Color flow mapping, 3D, continuous wave Doppler and pulse wave Doppler  Echocardiographic and Doppler Measurements PREPROCEDURE LEFT VENTRICLE: Systolic Function: normal  Ejection Fraction: 50-55%  Cavity size: normal    Regional Wall Motion Abnormalities: none  RIGHT VENTRICLE: Systolic Function: normal   Cavity size mildly dilated  Hypertrophy (LVH) present  AORTIC VALVE: Leaflets: trileaflet  Leaflet motions restricted  Stenosis: severe  Mean Gradient: 27 mmHg  Peak Gradient: 47 mmHg  Area: 0 66 cm²  Regurgitation: none  MITRAL VALVE: Leaflets: normal  Leaflet Motions: normal  Regurgitation: mild  Stenosis: none  TRICUSPID VALVE: Leaflets: normal  Leaflet Motions: normal  Stenosis: none  Regurgitation: mild  PULMONIC VALVE: Leaflets: normal  Regurgitation: trace  Stenosis: none  ASCENDING AORTA: Size:  normal   Dissection not present  AORTIC ARCH: Size:  normal   dissection not present  Grade 3: atheroma protruding < 0 5 cm into lumen  DESCENDING AORTA: Size: normal   Dissection not present  Grade 3: atheroma protruding < 0 5 cm into lumen  RIGHT ATRIUM: Size:  dilated  No spontaneous echo contrast  LEFT ATRIUM: Size: dilated  Spontaneous echo contrast  LEFT ATRIAL APPENDAGE: Size: dilated  Spontaneous echo contrast  ATRIAL SEPTUM: Intra-atrial septal morphology: lipomatous hypertrophy  VENTRICULAR SEPTUM: Intra-ventricular septum morphology: normal  OTHER FINDINGS: Pericardium:  normal  Pleural Effusion:  none  POSTPROCEDURE LEFT VENTRICLE: Unchanged   RIGHT VENTRICLE: Unchanged   AORTIC VALVE: Leaflets: bioprosthetic  Stenosis: none  Mean Gradient: 3 mmHg  Regurgitation: 1 trace PVL seen and trace  Valve Size: 26 mm  MITRAL VALVE: Unchanged   TRICUSPID VALVE: Unchanged   PULMONIC VALVE: Unchanged   ATRIA: Unchanged   AORTA: Unchanged   REMOVAL: Probe Removal: atraumatic        XR chest portable ICU    Result Date: 4/18/2023  Narrative: CHEST INDICATION: S/P Transcatheter aortic valve replacement  COMPARISON:  October 6, 2017 EXAM PERFORMED/VIEWS:  XR CHEST PORTABLE ICU Images: 2 FINDINGS:  Right internal jugular central line has its tip overlying the right mainstem bronchus  This would be consistent with a location, within the high right atrium  This is appropriate  Percutaneous aortic valve is visible in the appropriate location  Cardiomediastinal silhouette appears, otherwise, unremarkable  The lungs are clear  No pneumothorax or pleural effusion  Osseous structures appear within normal limits for patient age  Impression: No complication after valve replacement  Good valve position  Workstation performed: [id number]     JC intraop interventional w/realtime guidance of cardiac procedures    Result Date: 4/18/2023  Narrative: This order contains the linked images for the JC that was performed by the Anesthesiologist   Please see the  CARDIAC JC ANESTHESIA procedure for results  Echo complete w/ contrast if indicated    Result Date: 4/19/2023  Narrative: Misti iMranda  Left Ventricle: Left ventricular cavity size is normal  Wall thickness is moderately increased  There is moderate concentric hypertrophy  The left ventricular ejection fraction is 65%  Systolic function is normal  Wall motion is normal    Left Atrium: The atrium is severely dilated (>48 mL/m2)    Right Atrium: The atrium is mildly dilated    Aortic Valve: There is an Clark VANESA 3 Ultra 26 mm TAVR bioprosthetic valve  The prosthetic valve appears well-seated and appears to be functioning normally  There is no evidence of paravalvular regurgitation  There is no evidence of transvalvular regurgitation  The aortic valve peak velocity is 1 69 m/s  The aortic valve mean gradient is 6 mmHg  The dimensionless velocity index is 0 56  The aortic valve area is 1 95 cm2  The stroke volume index is 23 30 ml/m2    Mitral Valve: There is moderate annular calcification    Tricuspid Valve:  There is "moderate regurgitation  ECG: atrial fibrillation with RVR    Review of Systems   All other systems reviewed and are negative  Vitals:    05/01/23 1407   BP: 124/70   Pulse: 105     Vitals:    05/01/23 1407   Weight: 112 kg (247 lb 9 6 oz)     Height: 5' 6 93\" (170 cm)   Body mass index is 38 86 kg/m²  Physical Exam:  Physical Exam  Vitals reviewed  Constitutional:       General: He is not in acute distress  Appearance: He is well-developed  He is not diaphoretic  HENT:      Head: Normocephalic and atraumatic  Eyes:      Pupils: Pupils are equal, round, and reactive to light  Neck:      Vascular: No carotid bruit  Cardiovascular:      Rate and Rhythm: Tachycardia present  Rhythm irregularly irregular  Pulses:           Radial pulses are 2+ on the right side and 2+ on the left side  Dorsalis pedis pulses are 2+ on the right side and 2+ on the left side  Heart sounds: S1 normal and S2 normal  No murmur heard  Pulmonary:      Effort: Pulmonary effort is normal  No respiratory distress  Breath sounds: Normal breath sounds  No wheezing or rales  Abdominal:      General: There is no distension  Palpations: Abdomen is soft  Tenderness: There is no abdominal tenderness  Musculoskeletal:         General: Normal range of motion  Cervical back: Normal range of motion  Right lower leg: Edema (trace edema bilaterally) present  Left lower leg: Edema present  Skin:     General: Skin is warm and dry  Findings: No erythema  Neurological:      General: No focal deficit present  Mental Status: He is alert and oriented to person, place, and time     Psychiatric:         Mood and Affect: Mood normal          Behavior: Behavior normal          "

## 2023-05-01 ENCOUNTER — HOSPITAL ENCOUNTER (OUTPATIENT)
Dept: NON INVASIVE DIAGNOSTICS | Facility: HOSPITAL | Age: 78
Discharge: HOME | End: 2023-05-01

## 2023-05-01 ENCOUNTER — OFFICE VISIT (OUTPATIENT)
Dept: CARDIOLOGY CLINIC | Facility: HOSPITAL | Age: 78
End: 2023-05-01

## 2023-05-01 VITALS
WEIGHT: 247.6 LBS | HEART RATE: 105 BPM | SYSTOLIC BLOOD PRESSURE: 124 MMHG | BODY MASS INDEX: 38.86 KG/M2 | DIASTOLIC BLOOD PRESSURE: 70 MMHG | HEIGHT: 67 IN

## 2023-05-01 DIAGNOSIS — I50.32 CHRONIC DIASTOLIC (CONGESTIVE) HEART FAILURE (HCC): ICD-10-CM

## 2023-05-01 DIAGNOSIS — I48.19 PERSISTENT ATRIAL FIBRILLATION (HCC): ICD-10-CM

## 2023-05-01 DIAGNOSIS — G47.33 OBSTRUCTIVE SLEEP APNEA: ICD-10-CM

## 2023-05-01 DIAGNOSIS — I25.10 NONOCCLUSIVE CORONARY ATHEROSCLEROSIS OF NATIVE CORONARY ARTERY: ICD-10-CM

## 2023-05-01 DIAGNOSIS — I35.0 NONRHEUMATIC AORTIC VALVE STENOSIS: Primary | ICD-10-CM

## 2023-05-01 DIAGNOSIS — E78.5 DYSLIPIDEMIA: ICD-10-CM

## 2023-05-01 DIAGNOSIS — Z95.2 S/P TAVR (TRANSCATHETER AORTIC VALVE REPLACEMENT): ICD-10-CM

## 2023-05-01 DIAGNOSIS — I10 ESSENTIAL HYPERTENSION: ICD-10-CM

## 2023-05-04 DIAGNOSIS — M19.90 ARTHRITIS: ICD-10-CM

## 2023-05-04 DIAGNOSIS — E78.2 MIXED HYPERLIPIDEMIA: ICD-10-CM

## 2023-05-04 DIAGNOSIS — K21.9 GASTROESOPHAGEAL REFLUX DISEASE: ICD-10-CM

## 2023-05-04 DIAGNOSIS — I48.19 PERSISTENT ATRIAL FIBRILLATION (HCC): ICD-10-CM

## 2023-05-04 DIAGNOSIS — I50.32 CHF (CONGESTIVE HEART FAILURE), NYHA CLASS II, CHRONIC, DIASTOLIC (HCC): ICD-10-CM

## 2023-05-04 DIAGNOSIS — I10 ESSENTIAL HYPERTENSION: ICD-10-CM

## 2023-05-04 DIAGNOSIS — E78.5 DYSLIPIDEMIA: ICD-10-CM

## 2023-05-04 RX ORDER — NIFEDIPINE 90 MG/1
TABLET, EXTENDED RELEASE ORAL
Qty: 30 TABLET | Refills: 4 | Status: SHIPPED | OUTPATIENT
Start: 2023-05-04

## 2023-05-04 RX ORDER — FUROSEMIDE 40 MG/1
TABLET ORAL
Qty: 60 TABLET | Refills: 3 | Status: SHIPPED | OUTPATIENT
Start: 2023-05-04

## 2023-05-04 RX ORDER — METOPROLOL TARTRATE 50 MG/1
50 TABLET, FILM COATED ORAL EVERY 12 HOURS SCHEDULED
Qty: 60 TABLET | Refills: 4 | Status: SHIPPED | OUTPATIENT
Start: 2023-05-04

## 2023-05-04 RX ORDER — EZETIMIBE 10 MG/1
TABLET ORAL
Qty: 30 TABLET | Refills: 4 | Status: SHIPPED | OUTPATIENT
Start: 2023-05-04

## 2023-05-04 RX ORDER — ROSUVASTATIN CALCIUM 20 MG/1
TABLET, COATED ORAL
Qty: 30 TABLET | Refills: 4 | Status: SHIPPED | OUTPATIENT
Start: 2023-05-04

## 2023-05-04 RX ORDER — PREDNISONE 5 MG/1
TABLET ORAL
Qty: 30 TABLET | Refills: 4 | Status: SHIPPED | OUTPATIENT
Start: 2023-05-04

## 2023-05-04 RX ORDER — OMEPRAZOLE 20 MG/1
CAPSULE, DELAYED RELEASE ORAL
Qty: 30 CAPSULE | Refills: 4 | Status: SHIPPED | OUTPATIENT
Start: 2023-05-04

## 2023-05-04 RX ORDER — FOLIC ACID 1 MG/1
TABLET ORAL
Qty: 30 TABLET | Refills: 4 | Status: SHIPPED | OUTPATIENT
Start: 2023-05-04

## 2023-05-17 ENCOUNTER — APPOINTMENT (OUTPATIENT)
Dept: LAB | Facility: HOSPITAL | Age: 78
End: 2023-05-17

## 2023-05-17 ENCOUNTER — LAB (OUTPATIENT)
Dept: LAB | Facility: HOSPITAL | Age: 78
End: 2023-05-17

## 2023-05-17 DIAGNOSIS — Z95.2 S/P TAVR (TRANSCATHETER AORTIC VALVE REPLACEMENT): ICD-10-CM

## 2023-05-17 DIAGNOSIS — I35.0 SEVERE AORTIC STENOSIS: ICD-10-CM

## 2023-05-17 LAB
ANION GAP SERPL CALCULATED.3IONS-SCNC: 11 MMOL/L (ref 4–13)
ATRIAL RATE: 375 BPM
BASOPHILS # BLD AUTO: 0.02 THOUSANDS/ÂΜL (ref 0–0.1)
BASOPHILS NFR BLD AUTO: 0 % (ref 0–1)
BUN SERPL-MCNC: 15 MG/DL (ref 5–25)
CALCIUM SERPL-MCNC: 9.4 MG/DL (ref 8.4–10.2)
CHLORIDE SERPL-SCNC: 98 MMOL/L (ref 96–108)
CO2 SERPL-SCNC: 27 MMOL/L (ref 21–32)
CREAT SERPL-MCNC: 0.74 MG/DL (ref 0.6–1.3)
EOSINOPHIL # BLD AUTO: 0.37 THOUSAND/ÂΜL (ref 0–0.61)
EOSINOPHIL NFR BLD AUTO: 4 % (ref 0–6)
ERYTHROCYTE [DISTWIDTH] IN BLOOD BY AUTOMATED COUNT: 12.9 % (ref 11.6–15.1)
GFR SERPL CREATININE-BSD FRML MDRD: 89 ML/MIN/1.73SQ M
GLUCOSE P FAST SERPL-MCNC: 109 MG/DL (ref 65–99)
HCT VFR BLD AUTO: 47.9 % (ref 36.5–49.3)
HGB BLD-MCNC: 15.9 G/DL (ref 12–17)
IMM GRANULOCYTES # BLD AUTO: 0.01 THOUSAND/UL (ref 0–0.2)
IMM GRANULOCYTES NFR BLD AUTO: 0 % (ref 0–2)
LYMPHOCYTES # BLD AUTO: 3.53 THOUSANDS/ÂΜL (ref 0.6–4.47)
LYMPHOCYTES NFR BLD AUTO: 37 % (ref 14–44)
MCH RBC QN AUTO: 28.8 PG (ref 26.8–34.3)
MCHC RBC AUTO-ENTMCNC: 33.2 G/DL (ref 31.4–37.4)
MCV RBC AUTO: 87 FL (ref 82–98)
MONOCYTES # BLD AUTO: 1.06 THOUSAND/ÂΜL (ref 0.17–1.22)
MONOCYTES NFR BLD AUTO: 11 % (ref 4–12)
NEUTROPHILS # BLD AUTO: 4.61 THOUSANDS/ÂΜL (ref 1.85–7.62)
NEUTS SEG NFR BLD AUTO: 48 % (ref 43–75)
NRBC BLD AUTO-RTO: 0 /100 WBCS
PLATELET # BLD AUTO: 118 THOUSANDS/UL (ref 149–390)
PMV BLD AUTO: 9.9 FL (ref 8.9–12.7)
POTASSIUM SERPL-SCNC: 3.2 MMOL/L (ref 3.5–5.3)
QRS AXIS: 78 DEGREES
QRSD INTERVAL: 98 MS
QT INTERVAL: 360 MS
QTC INTERVAL: 452 MS
RBC # BLD AUTO: 5.53 MILLION/UL (ref 3.88–5.62)
SODIUM SERPL-SCNC: 136 MMOL/L (ref 135–147)
T WAVE AXIS: -5 DEGREES
VENTRICULAR RATE: 95 BPM
WBC # BLD AUTO: 9.6 THOUSAND/UL (ref 4.31–10.16)

## 2023-05-19 ENCOUNTER — OFFICE VISIT (OUTPATIENT)
Dept: CARDIAC SURGERY | Facility: CLINIC | Age: 78
End: 2023-05-19

## 2023-05-19 ENCOUNTER — HOSPITAL ENCOUNTER (OUTPATIENT)
Dept: NON INVASIVE DIAGNOSTICS | Facility: HOSPITAL | Age: 78
Discharge: HOME | End: 2023-05-19

## 2023-05-19 VITALS
HEART RATE: 113 BPM | WEIGHT: 244.6 LBS | DIASTOLIC BLOOD PRESSURE: 82 MMHG | OXYGEN SATURATION: 97 % | SYSTOLIC BLOOD PRESSURE: 142 MMHG | BODY MASS INDEX: 39.31 KG/M2 | HEIGHT: 66 IN | TEMPERATURE: 97.3 F

## 2023-05-19 VITALS
WEIGHT: 247 LBS | BODY MASS INDEX: 38.77 KG/M2 | HEIGHT: 67 IN | DIASTOLIC BLOOD PRESSURE: 70 MMHG | HEART RATE: 105 BPM | SYSTOLIC BLOOD PRESSURE: 124 MMHG

## 2023-05-19 DIAGNOSIS — Z48.89 ENCOUNTER FOR POSTOPERATIVE CARE: ICD-10-CM

## 2023-05-19 DIAGNOSIS — Z95.2 S/P TAVR (TRANSCATHETER AORTIC VALVE REPLACEMENT): ICD-10-CM

## 2023-05-19 DIAGNOSIS — Z95.2 S/P TAVR (TRANSCATHETER AORTIC VALVE REPLACEMENT): Primary | ICD-10-CM

## 2023-05-19 DIAGNOSIS — I35.0 SEVERE AORTIC STENOSIS: ICD-10-CM

## 2023-05-19 LAB
AORTIC ROOT: 2.5 CM
AORTIC VALVE MEAN VELOCITY: 11.4 M/S
APICAL FOUR CHAMBER EJECTION FRACTION: 55 %
APICAL TWO CHAMBER EJECTION FRACTION: 57 %
ASCENDING AORTA: 3.4 CM
AV AREA BY CONTINUOUS VTI: 1.8 CM2
AV AREA PEAK VELOCITY: 1.6 CM2
AV LVOT MEAN GRADIENT: 2 MMHG
AV LVOT PEAK GRADIENT: 3 MMHG
AV MEAN GRADIENT: 6 MMHG
AV PEAK GRADIENT: 13 MMHG
AV VALVE AREA: 1.78 CM2
AV VELOCITY RATIO: 0.51
DOP CALC AO PEAK VEL: 1.8 M/S
DOP CALC AO VTI: 32.36 CM
DOP CALC LVOT AREA: 3.14 CM2
DOP CALC LVOT DIAMETER: 2 CM
DOP CALC LVOT PEAK VEL VTI: 18.3 CM
DOP CALC LVOT PEAK VEL: 0.92 M/S
DOP CALC LVOT STROKE INDEX: 25.3 ML/M2
DOP CALC LVOT STROKE VOLUME: 57.46 CM3
FRACTIONAL SHORTENING: 36 % (ref 28–44)
INTERVENTRICULAR SEPTUM IN DIASTOLE (PARASTERNAL SHORT AXIS VIEW): 1.3 CM
INTERVENTRICULAR SEPTUM: 1.3 CM (ref 0.6–1.1)
LAAS-AP2: 30.9 CM2
LAAS-AP4: 32.2 CM2
LEFT ATRIUM SIZE: 5.4 CM
LEFT INTERNAL DIMENSION IN SYSTOLE: 2.3 CM (ref 2.1–4)
LEFT VENTRICULAR INTERNAL DIMENSION IN DIASTOLE: 3.6 CM (ref 3.5–6)
LEFT VENTRICULAR POSTERIOR WALL IN END DIASTOLE: 1.3 CM
LEFT VENTRICULAR STROKE VOLUME: 37 ML
LV EF: 56 %
LVSV (TEICH): 37 ML
RA PRESSURE ESTIMATED: 3 MMHG
RIGHT ATRIUM AREA SYSTOLE A4C: 26.9 CM2
RIGHT VENTRICLE ID DIMENSION: 3.6 CM
RV PSP: 35 MMHG
SL CV LEFT ATRIUM LENGTH A2C: 7.3 CM
SL CV LV EF: 65
SL CV PED ECHO LEFT VENTRICLE DIASTOLIC VOLUME (MOD BIPLANE) 2D: 55 ML
SL CV PED ECHO LEFT VENTRICLE SYSTOLIC VOLUME (MOD BIPLANE) 2D: 19 ML
TR MAX PG: 32 MMHG
TR PEAK VELOCITY: 2.8 M/S
TRICUSPID ANNULAR PLANE SYSTOLIC EXCURSION: 1.9 CM
TRICUSPID VALVE PEAK REGURGITATION VELOCITY: 2.83 M/S

## 2023-05-19 NOTE — PROGRESS NOTES
POST OP FOLLOW UP VISIT S/P TAVR    Procedure:   4/18/23  Transcatheter aortic valve replacement with a 26 mm Clark VANESA 3 Ultra bioprosthetic valve via a percutaneous right transfemoral approach  History: Gely Brooks Sr  is a 70y o  year old male who presents to our office today for routine follow up care from transfemoral transcatheter aortic valve replacement  Patient tolerated procedure well  Pos top echo with stable appearance of TAVR  Post op ECG with Afib  Patient's PMHx is notable for AS s/p TAVR 4/18/23, CAD (non-obst), Afib (Eliquis), HTN, HLD, MARTINA (no CPAP), CHF, pre-diabetes (Ac1 6 4%), morbid obesity (BMI 36 31), GERD, RA, h/o diverticulitis s/p colectomy and colon polyps  Upon interview today patient reports an improvement in who he is feeling  His breathing is improved and the pain he had in his upper back, prior to his procedure is resolved  He denies exertional dyspnea, chest pain, lightheadedness, palpitations or fluid retention  He denies bleeding or bruising issues    He denies issues with is right groin vascular access site  He reports burning, hot sensation along his right lateral thigh that is random and is not affected or caused by activity or sitting  He is able to ambulate without difficulty         Review of System:     History obtained from chart review and the patient  General ROS: negative  Psychological ROS: negative  Ophthalmic ROS: negative  ENT ROS: negative  Allergy and Immunology ROS: negative  Hematological and Lymphatic ROS: negative  Endocrine ROS: negative  Breast ROS: negative  Respiratory ROS: no cough, shortness of breath, or wheezing  Cardiovascular ROS: no chest pain or dyspnea on exertion  Gastrointestinal ROS: no abdominal pain, change in bowel habits, or black or bloody stools  Genito-Urinary ROS: no dysuria, trouble voiding, or hematuria  Musculoskeletal ROS: negative  Neurological ROS: positive for - paresthesias right lateral "thigh  Dermatological ROS: negative    Vital Signs:     Vitals:    05/19/23 1300   BP: 142/82   BP Location: Right arm   Patient Position: Sitting   Cuff Size: Standard   Pulse: (!) 113   Temp: (!) 97 3 °F (36 3 °C)   TempSrc: Tympanic   SpO2: 97%   Weight: 111 kg (244 lb 9 6 oz)   Height: 5' 6\" (1 676 m)       Allergies:    No Known Allergies    Home Medications:     Prior to Admission medications    Medication Sig Start Date End Date Taking? Authorizing Provider   ezetimibe (ZETIA) 10 mg tablet Take 1 tablet by mouth daily 5/4/23   Talihinaarnaldo Cornejo, DO   folic acid (FOLVITE) 1 mg tablet TAKE (1) TABLET BY MOUTH DAILY  5/4/23   Su Cornejo, DO   omeprazole (PriLOSEC) 20 mg delayed release capsule TAKE (1) CAPSULE DAILY  5/4/23   Su Cornejo, DO   predniSONE 5 mg tablet TAKE ONE TABLET DAILY  5/4/23   Su Cornejo, DO   acetaminophen (TYLENOL) 650 mg CR tablet Take 1 tablet (650 mg total) by mouth every 8 (eight) hours as needed for mild pain 4/19/23 5/19/23  Sushil Parker PA-C   apixaban (Eliquis) 5 mg Take 1 tablet (5 mg total) by mouth 2 (two) times a day 3/13/23   JACQUIE Reddy   aspirin 81 mg chewable tablet Chew 1 tablet (81 mg total) daily Do not start before April 20, 2023 4/20/23   Sushil Parker PA-C   furosemide (LASIX) 40 mg tablet TAKE ONE TABLET BY MOUTH TWICE A DAY  5/4/23   Dc Mariee,    lisinopril (ZESTRIL) 20 mg tablet Take 1 tablet (20 mg total) by mouth 2 (two) times a day Do not start before March 14, 2023  3/14/23   JACQUIE Reddy   losartan (COZAAR) 100 MG tablet Take 1 tablet (100 mg total) by mouth daily Do not start before March 14, 2023  3/14/23   JACQUIE Reddy   metoprolol tartrate (LOPRESSOR) 50 mg tablet Take 1 tablet (50 mg total) by mouth every 12 (twelve) hours 5/4/23   Tigist Méndez PA-C   NIFEdipine (PROCARDIA XL) 90 mg 24 hr tablet TAKE 1 TABLET DAILY   5/4/23   Dc Mariee DO   rosuvastatin (CRESTOR) 20 MG tablet TAKE ONE TABLET BY " MOUTH DAILY  5/4/23   Tangela Sánchez DO       Physical Exam:    General: Alert, oriented, well developed, no acute distress  HEENT/NECK:  PERRLA  No jugular venous distention  Cardiac:Irregular rhythm, no murmurs rubs or gallops  Pulmonary:Breath sounds clear bilaterally  Abdomen:  Non-tender, Non-distended  Positive bowel sounds  Upper extremities: 2+ radial pulses; brisk capillary refill  Lower extremities: Extremities warm/dry  Right femoral pulse 2+ , no hematoma; PT/DP pulses 2+ bilaterally  1+ edema B/L  Incisions: Inguinal puncture site is clean, dry, and intact  Musculoskeletal: MAEE, stable gait  Neuro: Alert and oriented X 3  Sensation is grossly intact  No focal deficits  Skin: Warm/Dry, without rashes or lesions      Lab Results:     Results from last 7 days   Lab Units 05/17/23  0744   WBC Thousand/uL 9 60   HEMOGLOBIN g/dL 15 9   HEMATOCRIT % 47 9   PLATELETS Thousands/uL 118*     Results from last 7 days   Lab Units 05/17/23  0744   POTASSIUM mmol/L 3 2*   CHLORIDE mmol/L 98   CO2 mmol/L 27   BUN mg/dL 15   CREATININE mg/dL 0 74   CALCIUM mg/dL 9 4       Imaging Studies:     Transthoracic Echocardiogram: Today    Prelim data    Left Ventricle Measurements    Function/Volumes   A2C EF 57 %         A4C EF 55 %         LVOT stroke volume 57 46 cm3         LVOT stroke volume index 25 3 ml/m2         EF 56 %         Dimensions   LVIDd 3 6 cm         LVIDS 2 3 cm         IVSd 1 3 cm         LVPWd 1 3 cm         LVOT area 3 14 cm2         FS 36 %          Report Measurements   AV LVOT peak gradient 3 mmHg              Interventricular Septum Measurements    Shunt Ratio   LVOT peak VTI 18 3 cm         LVOT peak reji 0 92 m/s              Right Ventricle Measurements    Dimensions   RVID d 4 5 cm         Tricuspid annular plane systolic excursion 1 9 cm               Left Atrium Measurements    Dimensions   LA size 5 4 cm         LA length (A2C) 7 3 cm               Right Atrium Measurements    Dimensions   RAA A4C 26 9 cm2               Atrial Septum Measurements    Shunt Ratio   LVOT peak VTI 18 3 cm         LVOT peak sergey 0 92 m/s               Aortic Valve Measurements    Stenosis   Aortic valve peak velocity 1 8 m/s         LVOT peak sergey 0 92 m/s         Ao VTI 32 36 cm         LVOT peak VTI 18 3 cm         AV mean gradient 6 mmHg         LVOT mn grad 2 mmHg         AV peak gradient 13 mmHg         AV LVOT peak gradient 3 mmHg         Area/Dimensions   DVI 0 51          AV valve area 1 78 cm2         AV area by cont VTI 1 8 cm2         AV area peak sergey 1 6 cm2         LVOT diameter 2 cm         LVOT area 3 14 cm2               Tricuspid Valve Measurements    RVSP Parameters   TR Peak Sergey 2 8 m/s         Triscuspid Valve Regurgitation Peak Gradient 32 mmHg               Aorta Measurements    Aortic Dimensions   Ao root 2 5 cm         Asc Ao 3 4 cm                   [id number]    Atrial fibrillation  Nonspecific ST and T wave abnormality    I have personally reviewed pertinent films in PACS     PCP and Cardiology notes reviewed    TAVR evaluation Assessment:     Rachel Rich 122: I    Assessment:   Aortic stenosis, Non-Rheumatic  S/P transfemoral transcatheter aortic valve replacement    Raheem Merry Hanna Sr  is making good progress in their post-op recovery  NYHA functional class I  Right groin site is well healed  Weight and VS are stable  Recent echocardiogram demonstrates normally functioning TAVR bioprosthesis, no PVL; AWAIT FINAL REPORT   ECG, BMP & CBC stable  Plan:   Test results reviewed with patient  Medications reviewed with patient  Patient is on Eliquis for Afib  Aspirin 81 mg daily is lifelong for antiplatelet therapy for TAVR bioprosthesis  Benefits of participating in cardiac rehabilitation discussed with patient and they are cleared to proceed with the program     May resume driving and all normal activities      Jessica Lindo will return for one year follow-up in our office with repeat echocardiogram, ECG, CBC & BMP  Our office will contact patient to schedule appointment  They have been advised to maintain routine follow up with their cardiologist and PCP for ongoing medical care  Paraesthesias right thigh likely irritation of  right lateral femoral cutaneous nerve- f/u with PCP if no improvement in several weeks  Antibiotic prophylaxis for dental procedures, as per guidelines set by American Dental Association, reviewed with patient  Patient was comfortable with our recommendations and their questions were answered to their satisfaction  The patient recently had a screening colonoscopy in 8/8/22  Therefore GI referral is not indicated at this time       JACQUIE Moon  5/19/23  1:26 PM

## 2023-06-14 DIAGNOSIS — Z95.2 S/P TAVR (TRANSCATHETER AORTIC VALVE REPLACEMENT): ICD-10-CM

## 2023-06-14 DIAGNOSIS — I35.9 NONRHEUMATIC AORTIC VALVE DISORDER: ICD-10-CM

## 2023-06-14 RX ORDER — ASPIRIN 81 MG
TABLET,CHEWABLE ORAL
Qty: 30 TABLET | Refills: 4 | Status: SHIPPED | OUTPATIENT
Start: 2023-06-14

## 2023-07-07 ENCOUNTER — RA CDI HCC (OUTPATIENT)
Dept: OTHER | Facility: HOSPITAL | Age: 78
End: 2023-07-07

## 2023-07-07 NOTE — PROGRESS NOTES
[address] [facility] coding opportunities          Chart Reviewed number of suggestions sent to Provider: 1     Patients Insurance     Medicare Insurance: Medicare        I11.0

## 2023-07-13 DIAGNOSIS — I48.0 PAF (PAROXYSMAL ATRIAL FIBRILLATION) (HCC): ICD-10-CM

## 2023-07-13 DIAGNOSIS — I10 ESSENTIAL HYPERTENSION: ICD-10-CM

## 2023-07-13 RX ORDER — LISINOPRIL 20 MG/1
TABLET ORAL
Qty: 60 TABLET | Refills: 4 | Status: SHIPPED | OUTPATIENT
Start: 2023-07-13

## 2023-07-13 RX ORDER — LOSARTAN POTASSIUM 100 MG/1
TABLET ORAL
Qty: 30 TABLET | Refills: 4 | Status: SHIPPED | OUTPATIENT
Start: 2023-07-13

## 2023-07-14 ENCOUNTER — OFFICE VISIT (OUTPATIENT)
Dept: FAMILY MEDICINE CLINIC | Facility: CLINIC | Age: 78
End: 2023-07-14
Payer: MEDICARE

## 2023-07-14 VITALS
SYSTOLIC BLOOD PRESSURE: 124 MMHG | BODY MASS INDEX: 39.73 KG/M2 | RESPIRATION RATE: 18 BRPM | HEART RATE: 76 BPM | DIASTOLIC BLOOD PRESSURE: 78 MMHG | TEMPERATURE: 97.2 F | HEIGHT: 66 IN | WEIGHT: 247.2 LBS

## 2023-07-14 DIAGNOSIS — Z95.2 S/P TAVR (TRANSCATHETER AORTIC VALVE REPLACEMENT): Primary | ICD-10-CM

## 2023-07-14 DIAGNOSIS — E78.49 OTHER HYPERLIPIDEMIA: ICD-10-CM

## 2023-07-14 DIAGNOSIS — R73.9 HYPERGLYCEMIA: ICD-10-CM

## 2023-07-14 PROCEDURE — G0439 PPPS, SUBSEQ VISIT: HCPCS | Performed by: INTERNAL MEDICINE

## 2023-07-14 RX ORDER — APIXABAN 5 MG/1
TABLET, FILM COATED ORAL
Qty: 60 TABLET | Refills: 11 | Status: SHIPPED | OUTPATIENT
Start: 2023-07-14

## 2023-07-14 NOTE — PROGRESS NOTES
Name: Pablo Ramesh      : 1945      MRN: [id number]  Encounter Provider: Shelley Gates DO  Encounter Date: 2023   Encounter department: [address]     1. S/P TAVR (transcatheter aortic valve replacement)  -     Lipid panel; Future  Doing well postop and has Cardio appt in Fall     2. Hyperglycemia  -     HEMOGLOBIN A1C W/ EAG ESTIMATION; Future  -     Comprehensive metabolic panel; Future  -     Lipid panel; Future  Fasting labs prior to cardio appt   Lo carb diet     3. Other hyperlipidemia  -     Lipid panel; Future  Stay active Lo fat diet        6months/awv    Subjective      HPI   Pt s/p tavr and doing well Less gibbons and has more energy No chest pain No dizziness He is active and feels he can complete more tasks He is staying hydrated No bowel issues   Review of Systems   Constitutional: Negative for chills and fever. HENT: Negative. Eyes: Negative for visual disturbance. Respiratory: Negative for cough and shortness of breath. Cardiovascular: Negative for chest pain, palpitations and leg swelling. Gastrointestinal: Negative for abdominal distention and abdominal pain. Genitourinary: Negative. Musculoskeletal: Negative. Neurological: Negative for dizziness, light-headedness and headaches. Psychiatric/Behavioral: Negative for sleep disturbance. The patient is not nervous/anxious. Current Outpatient Medications on File Prior to Visit   Medication Sig   • Aspirin Low Dose 81 MG chewable tablet Chew 1 tablet(81 mg total) daily Do not start before 2023. • ezetimibe (ZETIA) 10 mg tablet Take 1 tablet by mouth daily   • folic acid (FOLVITE) 1 mg tablet TAKE (1) TABLET BY MOUTH DAILY. • furosemide (LASIX) 40 mg tablet TAKE ONE TABLET BY MOUTH TWICE A DAY. • lisinopril (ZESTRIL) 20 mg tablet TAKE (1) TABLET BY MOUTH TWICE DAILY. • losartan (COZAAR) 100 MG tablet TAKE (1) TABLET BY MOUTH DAILY.    • metoprolol tartrate (LOPRESSOR) 50 mg tablet Take 1 tablet (50 mg total) by mouth every 12 (twelve) hours   • NIFEdipine (PROCARDIA XL) 90 mg 24 hr tablet TAKE 1 TABLET DAILY. • omeprazole (PriLOSEC) 20 mg delayed release capsule TAKE (1) CAPSULE DAILY. • predniSONE 5 mg tablet TAKE ONE TABLET DAILY. • rosuvastatin (CRESTOR) 20 MG tablet TAKE ONE TABLET BY MOUTH DAILY. • [DISCONTINUED] apixaban (Eliquis) 5 mg Take 1 tablet (5 mg total) by mouth 2 (two) times a day   • Eliquis 5 MG TAKE ONE TABLET BY MOUTH TWICE A DAY. Objective     /78   Pulse 76   Temp (!) 97.2 °F (36.2 °C) (Temporal)   Resp 18   Ht 5' 6" (1.676 m)   Wt 112 kg (247 lb 3.2 oz)   BMI 39.90 kg/m²     Physical Exam  Vitals reviewed. Constitutional:       General: He is not in acute distress. Appearance: Normal appearance. He is not ill-appearing, toxic-appearing or diaphoretic. HENT:      Head: Normocephalic and atraumatic. Right Ear: External ear normal.      Left Ear: External ear normal.      Nose: Nose normal.      Mouth/Throat:      Mouth: Mucous membranes are moist.   Eyes:      General: No scleral icterus. Extraocular Movements: Extraocular movements intact. Conjunctiva/sclera: Conjunctivae normal.      Pupils: Pupils are equal, round, and reactive to light. Cardiovascular:      Rate and Rhythm: Normal rate and regular rhythm. Pulses: Normal pulses. Heart sounds: Normal heart sounds. No murmur heard. Pulmonary:      Effort: Pulmonary effort is normal. No respiratory distress. Breath sounds: Normal breath sounds. No wheezing. Abdominal:      General: Bowel sounds are normal. There is no distension. Palpations: Abdomen is soft. Tenderness: There is no abdominal tenderness. Musculoskeletal:      Cervical back: Normal range of motion and neck supple. Right lower leg: No edema. Left lower leg: No edema. Lymphadenopathy:      Cervical: No cervical adenopathy.    Skin:     General: Skin is warm and dry. Coloration: Skin is not jaundiced or pale. Neurological:      General: No focal deficit present. Mental Status: He is alert and oriented to person, place, and time. Mental status is at baseline. Cranial Nerves: No cranial nerve deficit. Sensory: No sensory deficit. Psychiatric:         Mood and Affect: Mood normal.         Behavior: Behavior normal.         Thought Content:  Thought content normal.         Judgment: Judgment normal.       Dorean Feeling, DO

## 2023-10-12 DIAGNOSIS — Z95.2 S/P TAVR (TRANSCATHETER AORTIC VALVE REPLACEMENT): ICD-10-CM

## 2023-10-12 DIAGNOSIS — E78.5 DYSLIPIDEMIA: ICD-10-CM

## 2023-10-12 DIAGNOSIS — M19.90 ARTHRITIS: ICD-10-CM

## 2023-10-12 DIAGNOSIS — I10 ESSENTIAL HYPERTENSION: ICD-10-CM

## 2023-10-12 DIAGNOSIS — I50.32 CHF (CONGESTIVE HEART FAILURE), NYHA CLASS II, CHRONIC, DIASTOLIC (HCC): ICD-10-CM

## 2023-10-12 DIAGNOSIS — I35.9 NONRHEUMATIC AORTIC VALVE DISORDER: ICD-10-CM

## 2023-10-12 DIAGNOSIS — I48.19 PERSISTENT ATRIAL FIBRILLATION (HCC): ICD-10-CM

## 2023-10-12 DIAGNOSIS — K21.9 GASTROESOPHAGEAL REFLUX DISEASE: ICD-10-CM

## 2023-10-12 RX ORDER — OMEPRAZOLE 20 MG/1
CAPSULE, DELAYED RELEASE ORAL
Qty: 30 CAPSULE | Refills: 4 | Status: SHIPPED | OUTPATIENT
Start: 2023-10-12

## 2023-10-12 RX ORDER — FOLIC ACID 1 MG/1
TABLET ORAL
Qty: 30 TABLET | Refills: 4 | Status: SHIPPED | OUTPATIENT
Start: 2023-10-12

## 2023-10-12 RX ORDER — ASPIRIN 81 MG
81 TABLET,CHEWABLE ORAL DAILY
Qty: 30 TABLET | Refills: 4 | Status: SHIPPED | OUTPATIENT
Start: 2023-10-12

## 2023-10-12 RX ORDER — PREDNISONE 5 MG/1
TABLET ORAL
Qty: 30 TABLET | Refills: 4 | Status: SHIPPED | OUTPATIENT
Start: 2023-10-12

## 2023-10-13 RX ORDER — FUROSEMIDE 40 MG/1
TABLET ORAL
Qty: 60 TABLET | Refills: 11 | Status: SHIPPED | OUTPATIENT
Start: 2023-10-13

## 2023-10-13 RX ORDER — METOPROLOL TARTRATE 50 MG/1
50 TABLET, FILM COATED ORAL EVERY 12 HOURS SCHEDULED
Qty: 60 TABLET | Refills: 11 | Status: SHIPPED | OUTPATIENT
Start: 2023-10-13

## 2023-10-13 RX ORDER — NIFEDIPINE 90 MG/1
TABLET, EXTENDED RELEASE ORAL
Qty: 30 TABLET | Refills: 11 | Status: SHIPPED | OUTPATIENT
Start: 2023-10-13

## 2023-10-13 RX ORDER — ROSUVASTATIN CALCIUM 20 MG/1
TABLET, COATED ORAL
Qty: 30 TABLET | Refills: 11 | Status: SHIPPED | OUTPATIENT
Start: 2023-10-13

## 2023-10-17 ENCOUNTER — APPOINTMENT (OUTPATIENT)
Dept: LAB | Facility: HOSPITAL | Age: 78
End: 2023-10-17
Payer: MEDICARE

## 2023-10-17 DIAGNOSIS — R73.9 HYPERGLYCEMIA: ICD-10-CM

## 2023-10-17 DIAGNOSIS — Z95.2 S/P TAVR (TRANSCATHETER AORTIC VALVE REPLACEMENT): ICD-10-CM

## 2023-10-17 DIAGNOSIS — E78.49 OTHER HYPERLIPIDEMIA: ICD-10-CM

## 2023-10-17 LAB
ALBUMIN SERPL BCP-MCNC: 4.3 G/DL (ref 3.5–5)
ALP SERPL-CCNC: 47 U/L (ref 34–104)
ALT SERPL W P-5'-P-CCNC: 33 U/L (ref 7–52)
ANION GAP SERPL CALCULATED.3IONS-SCNC: 11 MMOL/L
AST SERPL W P-5'-P-CCNC: 35 U/L (ref 13–39)
BILIRUB SERPL-MCNC: 1.75 MG/DL (ref 0.2–1)
BUN SERPL-MCNC: 12 MG/DL (ref 5–25)
CALCIUM SERPL-MCNC: 9.2 MG/DL (ref 8.4–10.2)
CHLORIDE SERPL-SCNC: 101 MMOL/L (ref 96–108)
CHOLEST SERPL-MCNC: 152 MG/DL
CO2 SERPL-SCNC: 28 MMOL/L (ref 21–32)
CREAT SERPL-MCNC: 0.72 MG/DL (ref 0.6–1.3)
EST. AVERAGE GLUCOSE BLD GHB EST-MCNC: 151 MG/DL
GFR SERPL CREATININE-BSD FRML MDRD: 90 ML/MIN/1.73SQ M
GLUCOSE P FAST SERPL-MCNC: 111 MG/DL (ref 65–99)
HBA1C MFR BLD: 6.9 %
HDLC SERPL-MCNC: 84 MG/DL
LDLC SERPL CALC-MCNC: 53 MG/DL (ref 0–100)
NONHDLC SERPL-MCNC: 68 MG/DL
POTASSIUM SERPL-SCNC: 3.5 MMOL/L (ref 3.5–5.3)
PROT SERPL-MCNC: 7.4 G/DL (ref 6.4–8.4)
SODIUM SERPL-SCNC: 140 MMOL/L (ref 135–147)
TRIGL SERPL-MCNC: 73 MG/DL

## 2023-10-17 PROCEDURE — 80061 LIPID PANEL: CPT

## 2023-10-17 PROCEDURE — 80053 COMPREHEN METABOLIC PANEL: CPT

## 2023-10-17 PROCEDURE — 36415 COLL VENOUS BLD VENIPUNCTURE: CPT

## 2023-10-17 PROCEDURE — 83036 HEMOGLOBIN GLYCOSYLATED A1C: CPT

## 2023-10-20 ENCOUNTER — OFFICE VISIT (OUTPATIENT)
Dept: CARDIOLOGY CLINIC | Facility: HOSPITAL | Age: 78
End: 2023-10-20
Payer: MEDICARE

## 2023-10-20 VITALS
DIASTOLIC BLOOD PRESSURE: 80 MMHG | WEIGHT: 248.6 LBS | OXYGEN SATURATION: 96 % | HEART RATE: 100 BPM | HEIGHT: 66 IN | BODY MASS INDEX: 39.95 KG/M2 | SYSTOLIC BLOOD PRESSURE: 128 MMHG

## 2023-10-20 DIAGNOSIS — E78.5 DYSLIPIDEMIA: ICD-10-CM

## 2023-10-20 DIAGNOSIS — E66.01 CLASS 2 SEVERE OBESITY DUE TO EXCESS CALORIES WITH SERIOUS COMORBIDITY AND BODY MASS INDEX (BMI) OF 37.0 TO 37.9 IN ADULT: ICD-10-CM

## 2023-10-20 DIAGNOSIS — I47.19 PAROXYSMAL ATRIAL TACHYCARDIA: ICD-10-CM

## 2023-10-20 DIAGNOSIS — I35.0 SEVERE AORTIC STENOSIS: Primary | ICD-10-CM

## 2023-10-20 DIAGNOSIS — I50.32 CHRONIC DIASTOLIC CHF (CONGESTIVE HEART FAILURE) (HCC): ICD-10-CM

## 2023-10-20 DIAGNOSIS — G47.33 OBSTRUCTIVE SLEEP APNEA OF ADULT: ICD-10-CM

## 2023-10-20 DIAGNOSIS — I25.10 NONOCCLUSIVE CORONARY ATHEROSCLEROSIS OF NATIVE CORONARY ARTERY: ICD-10-CM

## 2023-10-20 DIAGNOSIS — I48.19 PERSISTENT ATRIAL FIBRILLATION (HCC): ICD-10-CM

## 2023-10-20 DIAGNOSIS — Z95.2 S/P TAVR (TRANSCATHETER AORTIC VALVE REPLACEMENT): ICD-10-CM

## 2023-10-20 PROCEDURE — 99214 OFFICE O/P EST MOD 30 MIN: CPT | Performed by: INTERNAL MEDICINE

## 2023-10-20 PROCEDURE — 93000 ELECTROCARDIOGRAM COMPLETE: CPT | Performed by: INTERNAL MEDICINE

## 2023-10-20 NOTE — PROGRESS NOTES
Cardiology Follow Up    [address] [facility]  5/76/1682  [id number]  [address] [place] [address] [facility] [address] [address]    1. Severe aortic stenosis        2. S/P TAVR (transcatheter aortic valve replacement)        3. Chronic diastolic CHF (congestive heart failure) ([address])        4. Nonocclusive coronary atherosclerosis of native coronary artery        5. Persistent atrial fibrillation (HCC)  POCT ECG      6. Paroxysmal atrial tachycardia        7. Dyslipidemia        8. Class 2 severe obesity due to excess calories with serious comorbidity and body mass index (BMI) of 37.0 to 37.9 in adult         9. Obstructive sleep apnea of adult            Discussion/Summary:  Mr. Moy Canada is a pleasant 29-year-old male who presents to the office today for routine follow-up. Since his last visit he feels well. He appears euvolemic on exam today. No changes were made to his diuretic regimen. He should continue to weigh himself on a regular basis and call the office with any signs or symptoms of volume overload. A low-salt diet was reinforced. His blood pressure is well controlled in the office today. No changes were made to his antihypertensive medication regimen. His most recent lipids from earlier this year were reviewed. They are acceptable on his current statin regimen to which no changes were advised. Regarding his atrial fibrillation, a rate-control strategy is being pursued. He underwent a Holter monitor after his last visit with our advanced practitioner revealing adequately controlled rates. No changes were made to his AV edward blocking regimen. He is maintained on systemic anticoagulation with Eliquis. He has known obstructive sleep apnea and is noncompliant with CPAP. He underwent an echocardiogram after his TAVR revealing a normally functioning bioprosthetic valve.   He was reminded of the need for antibiotics prior to the dentist.    I will see him back in the office in six months or sooner if deemed necessary. Interval History:   Mr. Lisha Mackay is a pleasant 70 nonobstructive-year-old male who presents to the office today for routine follow-up. Since his last visit with me he was seen by one of our advanced practitioners. He underwent a repeat echocardiogram revealing progression of his aortic stenosis in the severe range from which he was symptomatic. He was sent for a TAVR. He underwent a heart catheterization prior revealing non-obstructive coronary disease. He tolerated the procedure well without complications. He does not participate in any formal physical activity but is active around his home and in his yard. With the activity he is able to perform he is asymptomatic. He denies any exertional chest pain or shortness of breath. He denies any symptoms from his atrial fibrillation. He is maintained on systemic anticoagulation with Eliquis without bleeding consequences or falls. He denies any signs or symptoms of congestive heart failure including progressive lower extremity edema, paroxysmal nocturnal dyspnea, orthopnea, acute weight gain or increasing abdominal girth. He denies lightheadedness, syncope or presyncope. He denies symptoms of claudication. He remains non-compliant with CPAP.     Problem List       Aortic stenosis, mild    Chronic diastolic CHF (congestive heart failure) (HCC)    Coronary artery disease    Dyslipidemia    Hypertension    Obstructive sleep apnea of adult    Paroxysmal atrial fibrillation (HCC)    Paroxysmal atrial tachycardia (HCC)          Past Medical History:   Diagnosis Date    Atrial fibrillation [facility])     Colon polyp     Hyperlipidemia     Hypertension     Nonocclusive coronary atherosclerosis of native coronary artery 03/26/2015     Social History     Socioeconomic History    Marital status: /Civil Union     Spouse name: Not on file    Number of children: Not on file    Years of education: Not on file    Highest education level: Not on file   Occupational History    Not on file   Tobacco Use    Smoking status: Former    Smokeless tobacco: Former   Vaping Use    Vaping Use: Never used   Substance and Sexual Activity    Alcohol use: Yes     Alcohol/week: 4.0 standard drinks of alcohol     Types: 4 Cans of beer per week     Comment: daily    Drug use: No    Sexual activity: Not on file   Other Topics Concern    Not on file   Social History Narrative    Dental care, regularly    Good dental hygiene    No advance directives    No caffeine use    Uses safety equipment - Seatbelts      Social Determinants of Health     Financial Resource Strain: Low Risk  (9/13/2022)    Overall Financial Resource Strain (CARDIA)     Difficulty of Paying Living Expenses: Not very hard   Food Insecurity: No Food Insecurity (4/18/2023)    Hunger Vital Sign     Worried About Running Out of Food in the Last Year: Never true     Ran Out of Food in the Last Year: Never true   Transportation Needs: No Transportation Needs (4/18/2023)    PRAPARE - Transportation     Lack of Transportation (Medical): No     Lack of Transportation (Non-Medical): No   Physical Activity: Not on file   Stress: Not on file   Social Connections: Not on file   Intimate Partner Violence: Not on file   Housing Stability: Low Risk  (4/18/2023)    Housing Stability Vital Sign     Unable to Pay for Housing in the Last Year: No     Number of Places Lived in the Last Year: 1     Unstable Housing in the Last Year: No      Family History   Problem Relation Age of Onset    Diabetes Mother     Stroke Mother      Past Surgical History:   Procedure Laterality Date    APPENDECTOMY      BACK SURGERY  1996    disc repair    CARDIAC CATHETERIZATION      50% lesion of mid LAD, 40% lesion of first obtuse marginal lesion.  Last assessed 1/17/2018     CARDIAC CATHETERIZATION N/A 3/13/2023    Procedure: Cardiac catheterization;  Surgeon: Shraddha Trejo DO;  Location: [facility]; Service: Cardiology    CARDIAC CATHETERIZATION N/A 3/13/2023    Procedure: Cardiac Coronary Angiogram;  Surgeon: Shraddha Trejo DO;  Location: [facility]; Service: Cardiology    CARDIAC CATHETERIZATION N/A 4/18/2023    Procedure: Cardiac tavr;  Surgeon: Angelina Allen DO;  Location: [facility];  Service: Cardiology    CHOLECYSTECTOMY      COLON SURGERY      colon ressection    COLONOSCOPY N/A 01/14/2019    Procedure: COLONOSCOPY;  Surgeon: Sanjeev Mares MD;  Location: [facility];  Service: Colorectal    COLONOSCOPY  08/08/2022    CYSTOSCOPY      Diagnostic     HEMORRHOID SURGERY  08/2011    CA REPLACE AORTIC VALVE OPENFEMORAL ARTERY APPROACH N/A 4/18/2023    Procedure: REPLACEMENT AORTIC VALVE TRANSCATHETER (TAVR) TRANSFEMORAL W/ 26MM BROWN VANESA S3 ULTRA VALVE(ACCESS ON RIGHT) JC;  Surgeon: Viridiana Valenzuela MD;  Location: [facility];  Service: Cardiac Surgery    TONSILLECTOMY         Current Outpatient Medications:     Aspirin Low Dose 81 MG chewable tablet, Chew 1 tablet (81 mg total) daily. , Disp: 30 tablet, Rfl: 4    Eliquis 5 MG, TAKE ONE TABLET BY MOUTH TWICE A DAY., Disp: 60 tablet, Rfl: 11    ezetimibe (ZETIA) 10 mg tablet, Take 1 tablet by mouth daily, Disp: 30 tablet, Rfl: 4    folic acid (FOLVITE) 1 mg tablet, TAKE (1) TABLET BY MOUTH DAILY. , Disp: 30 tablet, Rfl: 4    furosemide (LASIX) 40 mg tablet, TAKE ONE TABLET BY MOUTH TWICE A DAY., Disp: 60 tablet, Rfl: 11    lisinopril (ZESTRIL) 20 mg tablet, TAKE (1) TABLET BY MOUTH TWICE DAILY. , Disp: 60 tablet, Rfl: 4    losartan (COZAAR) 100 MG tablet, TAKE (1) TABLET BY MOUTH DAILY. , Disp: 30 tablet, Rfl: 4    metoprolol tartrate (LOPRESSOR) 50 mg tablet, Take 1 tablet (50 mg total) by mouth every 12 (twelve) hours, Disp: 60 tablet, Rfl: 11    NIFEdipine (PROCARDIA XL) 90 mg 24 hr tablet, TAKE 1 TABLET DAILY. , Disp: 30 tablet, Rfl: 11 omeprazole (PriLOSEC) 20 mg delayed release capsule, TAKE (1) CAPSULE DAILY. , Disp: 30 capsule, Rfl: 4    predniSONE 5 mg tablet, TAKE ONE TABLET DAILY. , Disp: 30 tablet, Rfl: 4    rosuvastatin (CRESTOR) 20 MG tablet, TAKE ONE TABLET BY MOUTH DAILY. , Disp: 30 tablet, Rfl: 11  No Known Allergies    Labs:     Chemistry        Component Value Date/Time     12/31/2015 0705    K 3.5 10/17/2023 0714    K 4.3 12/31/2015 0705     10/17/2023 0714     12/31/2015 0705    CO2 28 10/17/2023 0714    CO2 25 04/18/2023 1237    BUN 12 10/17/2023 0714    BUN 14 12/31/2015 0705    CREATININE 0.72 10/17/2023 0714    CREATININE 0.83 12/31/2015 0705        Component Value Date/Time    CALCIUM 9.2 10/17/2023 0714    CALCIUM 8.6 12/31/2015 0705    ALKPHOS 47 10/17/2023 0714    ALKPHOS 51 12/31/2015 0705    AST 35 10/17/2023 0714    AST 29 12/31/2015 0705    ALT 33 10/17/2023 0714    ALT 48 12/31/2015 0705    BILITOT 1.07 (H) 12/31/2015 0705            Lab Results   Component Value Date    CHOL 213 12/31/2015    CHOL 226 03/12/2015     Lab Results   Component Value Date    HDL 84 10/17/2023    HDL 98 05/12/2022    HDL 82 07/01/2020     Lab Results   Component Value Date    LDLCALC 53 10/17/2023    LDLCALC 39 05/12/2022    LDLCALC 54 07/01/2020     Lab Results   Component Value Date    TRIG 73 10/17/2023    TRIG 59 05/12/2022    TRIG 102 07/01/2020     No results found for: "CHOLHDL"    Imaging: No results found. Review of Systems   Cardiovascular:  Negative for chest pain, claudication, cyanosis, dyspnea on exertion, irregular heartbeat, leg swelling and near-syncope. Musculoskeletal:  Positive for arthritis. All other systems reviewed and are negative. Vitals:    10/20/23 0922   BP: 128/80   Pulse: 100   SpO2: 96%       Vitals:    10/20/23 0922   Weight: 113 kg (248 lb 9.6 oz)       Height: 5' 6" (167.6 cm)   Body mass index is 40.13 kg/m².     Physical Exam:  General:  Alert and cooperative, appears stated age  HEENT:  PERRLA, EOMI, no scleral icterus, no conjunctival pallor  Neck:  No lymphadenopathy, no thyromegaly, no carotid bruits, no elevated JVP  Heart: Irregularly irregular, variable S1/S2, no murmur  Lungs:  Clear to auscultation bilaterally   Abdomen:  Soft, non-tender, positive bowel sounds, no rebound or guarding,   no organomegaly   Extremities:  No clubbing, cyanosis or edema   Skin:  No rashes or lesions on exposed skin  Neurologic:  Cranial nerves II-XII grossly intact without focal deficits

## 2023-11-15 DIAGNOSIS — I10 ESSENTIAL HYPERTENSION: ICD-10-CM

## 2023-11-15 RX ORDER — LISINOPRIL 20 MG/1
TABLET ORAL
Qty: 60 TABLET | Refills: 4 | Status: SHIPPED | OUTPATIENT
Start: 2023-11-15

## 2023-11-15 RX ORDER — LOSARTAN POTASSIUM 100 MG/1
TABLET ORAL
Qty: 30 TABLET | Refills: 4 | Status: SHIPPED | OUTPATIENT
Start: 2023-11-15

## 2023-12-14 DIAGNOSIS — R05.1 ACUTE COUGH: Primary | ICD-10-CM

## 2023-12-14 RX ORDER — BENZONATATE 100 MG/1
100 CAPSULE ORAL 3 TIMES DAILY PRN
Qty: 30 CAPSULE | Refills: 0 | Status: SHIPPED | OUTPATIENT
Start: 2023-12-14 | End: 2023-12-24

## 2024-01-10 ENCOUNTER — RA CDI HCC (OUTPATIENT)
Dept: OTHER | Facility: HOSPITAL | Age: 79
End: 2024-01-10

## 2024-01-10 NOTE — PROGRESS NOTES
HCC coding opportunities          Chart Reviewed number of suggestions sent to Provider: 1     Patients Insurance     Medicare Insurance: Medicare        I11.0

## 2024-01-17 ENCOUNTER — OFFICE VISIT (OUTPATIENT)
Dept: FAMILY MEDICINE CLINIC | Facility: CLINIC | Age: 79
End: 2024-01-17
Payer: MEDICARE

## 2024-01-17 VITALS
WEIGHT: 248.6 LBS | TEMPERATURE: 97.6 F | OXYGEN SATURATION: 93 % | BODY MASS INDEX: 39.95 KG/M2 | HEART RATE: 90 BPM | HEIGHT: 66 IN

## 2024-01-17 DIAGNOSIS — E66.01 CLASS 2 SEVERE OBESITY DUE TO EXCESS CALORIES WITH SERIOUS COMORBIDITY AND BODY MASS INDEX (BMI) OF 37.0 TO 37.9 IN ADULT: ICD-10-CM

## 2024-01-17 DIAGNOSIS — I48.19 PERSISTENT ATRIAL FIBRILLATION (HCC): ICD-10-CM

## 2024-01-17 DIAGNOSIS — M06.9 RHEUMATOID ARTHRITIS, INVOLVING UNSPECIFIED SITE, UNSPECIFIED WHETHER RHEUMATOID FACTOR PRESENT (HCC): ICD-10-CM

## 2024-01-17 DIAGNOSIS — R73.9 HYPERGLYCEMIA: Primary | ICD-10-CM

## 2024-01-17 DIAGNOSIS — E78.5 HYPERLIPIDEMIA, UNSPECIFIED HYPERLIPIDEMIA TYPE: ICD-10-CM

## 2024-01-17 DIAGNOSIS — I50.32 CHRONIC DIASTOLIC CHF (CONGESTIVE HEART FAILURE) (HCC): ICD-10-CM

## 2024-01-17 PROCEDURE — 99214 OFFICE O/P EST MOD 30 MIN: CPT | Performed by: INTERNAL MEDICINE

## 2024-01-17 NOTE — PROGRESS NOTES
Name: Phil Hanna .      : 1945      MRN: [id number]  Encounter Provider: Nely Valencia DO  Encounter Date: 2024   Encounter department: [facility]    Assessment & Plan     1. Hyperglycemia  -     Hemoglobin A1C; Future; Expected date: 2024  -     Comprehensive metabolic panel; Future  Lo carb diet Stay hydrated Activity as tolerated     2. Hyperlipidemia, unspecified hyperlipidemia type  -     LDL cholesterol, direct; Future; Expected date: 2024  Low fat diet Cardio followup in spring    3. Rheumatoid arthritis, involving unspecified site, unspecified whether rheumatoid factor present (HCC)  Pt stable and remains active     4. Chronic diastolic CHF (congestive heart failure) (HCC)  Stable Lo sodium diet     5. Persistent atrial fibrillation (HCC)  Stable on current rx Followed by Cardio - due spring     6. Class 2 severe obesity due to excess calories with serious comorbidity and body mass index (BMI) of 37.0 to 37.9 in adult   Encouraged weight loss Low fat diet.stay active         Depression Screening and Follow-up Plan: Patient was screened for depression during today's encounter. They screened negative with a PHQ-2 score of 0.  Rto 6months    Subjective      HPI  Pt doing well overall He is doing better since valve replacement He is more active Went hunting without issue this Fall No chest pain or dizziness Breathing seems easier No falls No acute issues - uri from holiday resolved with rx No cough or congestion   Review of Systems   Constitutional:  Negative for chills and fever.   HENT: Negative.     Eyes:  Negative for visual disturbance.   Respiratory:  Negative for cough, chest tightness and shortness of breath.    Cardiovascular:  Negative for chest pain, palpitations and leg swelling.   Gastrointestinal:  Negative for abdominal distention and abdominal pain.   Genitourinary: Negative.    Musculoskeletal: Negative.    Neurological:  Negative for dizziness,  "light-headedness and headaches.   Psychiatric/Behavioral:  Negative for sleep disturbance. The patient is not nervous/anxious.      Current Outpatient Medications on File Prior to Visit   Medication Sig   • Aspirin Low Dose 81 MG chewable tablet Chew 1 tablet (81 mg total) daily.   • Eliquis 5 MG TAKE ONE TABLET BY MOUTH TWICE A DAY.   • ezetimibe (ZETIA) 10 mg tablet Take 1 tablet by mouth daily   • folic acid (FOLVITE) 1 mg tablet TAKE (1) TABLET BY MOUTH DAILY.   • furosemide (LASIX) 40 mg tablet TAKE ONE TABLET BY MOUTH TWICE A DAY.   • lisinopril (ZESTRIL) 20 mg tablet TAKE (1) TABLET BY MOUTH TWICE DAILY.   • losartan (COZAAR) 100 MG tablet TAKE (1) TABLET BY MOUTH DAILY.   • metoprolol tartrate (LOPRESSOR) 50 mg tablet Take 1 tablet (50 mg total) by mouth every 12 (twelve) hours   • NIFEdipine (PROCARDIA XL) 90 mg 24 hr tablet TAKE 1 TABLET DAILY.   • omeprazole (PriLOSEC) 20 mg delayed release capsule TAKE (1) CAPSULE DAILY.   • predniSONE 5 mg tablet TAKE ONE TABLET DAILY.   • rosuvastatin (CRESTOR) 20 MG tablet TAKE ONE TABLET BY MOUTH DAILY.       Objective     Pulse 90   Temp 97.6 °F (36.4 °C) (Temporal)   Ht 5' 6\" (1.676 m)   Wt 113 kg (248 lb 9.6 oz)   SpO2 93%   BMI 40.13 kg/m²     Physical Exam  Vitals and nursing note reviewed.   Constitutional:       General: He is not in acute distress.     Appearance: Normal appearance. He is not ill-appearing, toxic-appearing or diaphoretic.   HENT:      Head: Normocephalic and atraumatic.      Right Ear: External ear normal.      Left Ear: External ear normal.      Nose: Nose normal.      Mouth/Throat:      Mouth: Mucous membranes are moist.   Eyes:      General: No scleral icterus.  Cardiovascular:      Rate and Rhythm: Normal rate and regular rhythm.      Pulses: Normal pulses.   Pulmonary:      Effort: Pulmonary effort is normal. No respiratory distress.      Breath sounds: Normal breath sounds. No wheezing.   Abdominal:      General: Bowel sounds are " normal. There is no distension.      Palpations: Abdomen is soft.      Tenderness: There is no abdominal tenderness.   Musculoskeletal:         General: Normal range of motion.      Cervical back: Normal range of motion and neck supple.      Right lower leg: No edema.      Left lower leg: No edema.   Lymphadenopathy:      Cervical: No cervical adenopathy.   Skin:     General: Skin is warm and dry.   Neurological:      General: No focal deficit present.      Mental Status: He is alert and oriented to person, place, and time. Mental status is at baseline.   Psychiatric:         Mood and Affect: Mood normal.         Behavior: Behavior normal.         Thought Content: Thought content normal.         Judgment: Judgment normal.   Nely Valencia,

## 2024-02-08 NOTE — INTERVAL H&P NOTE
H&P reviewed  After examining the patient I find no changes in the patients condition since the H&P had been written      Vitals:    03/13/23 0906   BP:    Pulse:    Resp:    Temp:    SpO2: 98%     /79 (BP Location: Right arm)   Pulse 90   Temp 97 9 °F (36 6 °C) (Oral)   Resp 16   Ht 5' 9" (1 753 m)   Wt 113 kg (250 lb)   SpO2 98%   BMI 36 92 kg/m²     For pre-TAVR coronary angiogram in setting paradoxical low-flow low-gradient calcific AS (TY 0 9 cm2, mean 26 mmHg, 3 2 m/s, DI 0 27, SVi 29 7 mL/m2)  -2/9 TTE: LVEF 65%, mod LVH, no WMA, normal RV size & function, biatrial enlargement, 1+ MR/TR with RVSP 47 mmHg     CAD Hx  Documented in Dr Debra Hou note from 2017 "50% lesion of mid lad; 40% lesion of first obtuse marginal lesion"  -2/9 Rx nuc stress: no perfusion defect, gated EF 70%        ECG 03/13/23  Atrial fibrillation, VR 73         Rebekah Miller MD / 03/13/23 / 9:14 AM Oriented - self; Oriented - place; Oriented - time

## 2024-02-22 ENCOUNTER — APPOINTMENT (OUTPATIENT)
Dept: LAB | Facility: HOSPITAL | Age: 79
End: 2024-02-22
Payer: MEDICARE

## 2024-02-22 DIAGNOSIS — E55.9 VITAMIN D DEFICIENCY DISEASE: ICD-10-CM

## 2024-02-22 LAB — 25(OH)D3 SERPL-MCNC: 10.3 NG/ML (ref 30–100)

## 2024-02-22 PROCEDURE — 82306 VITAMIN D 25 HYDROXY: CPT

## 2024-02-22 PROCEDURE — 36415 COLL VENOUS BLD VENIPUNCTURE: CPT

## 2024-03-15 DIAGNOSIS — M19.90 ARTHRITIS: ICD-10-CM

## 2024-03-15 DIAGNOSIS — E78.5 DYSLIPIDEMIA: ICD-10-CM

## 2024-03-15 DIAGNOSIS — K21.9 GASTROESOPHAGEAL REFLUX DISEASE: ICD-10-CM

## 2024-03-15 RX ORDER — ROSUVASTATIN CALCIUM 20 MG/1
TABLET, COATED ORAL
Qty: 30 TABLET | Refills: 11 | Status: SHIPPED | OUTPATIENT
Start: 2024-03-15

## 2024-03-15 RX ORDER — OMEPRAZOLE 20 MG/1
CAPSULE, DELAYED RELEASE ORAL
Qty: 30 CAPSULE | Refills: 4 | Status: SHIPPED | OUTPATIENT
Start: 2024-03-15

## 2024-03-15 RX ORDER — FOLIC ACID 1 MG/1
TABLET ORAL
Qty: 30 TABLET | Refills: 4 | Status: SHIPPED | OUTPATIENT
Start: 2024-03-15

## 2024-03-15 RX ORDER — PREDNISONE 5 MG/1
TABLET ORAL
Qty: 30 TABLET | Refills: 4 | Status: SHIPPED | OUTPATIENT
Start: 2024-03-15

## 2024-04-03 DIAGNOSIS — I35.0 SEVERE AORTIC STENOSIS: ICD-10-CM

## 2024-04-03 DIAGNOSIS — Z95.2 S/P TAVR (TRANSCATHETER AORTIC VALVE REPLACEMENT): Primary | ICD-10-CM

## 2024-04-05 ENCOUNTER — TELEPHONE (OUTPATIENT)
Dept: CARDIAC SURGERY | Facility: CLINIC | Age: 79
End: 2024-04-05

## 2024-04-09 NOTE — PROGRESS NOTES
Cardiology Follow Up    Phil Hanna Sr.  1945  [id number]  [facility] [address] [id number] [phone]    1. Severe aortic stenosis        2. Chronic diastolic CHF (congestive heart failure) (HCC)        3. Nonocclusive coronary atherosclerosis of native coronary artery        4. Obstructive sleep apnea of adult        5. S/P TAVR (transcatheter aortic valve replacement)        6. Dyslipidemia        7. Type 2 diabetes mellitus without complication, unspecified whether long term insulin use (HCC)        8. Persistent atrial fibrillation (HCC)        9. Benign essential HTN            Discussion/Summary:    Aortic stenosis:   He underwent TAVR on 4/18/23   Echo from May 2023: EF 65%; Clark VANESA 3 Ultra 26 mm TAVR bioprosthetic valve. The prosthetic valve appears well-seated and appears to be functioning normally. There is no evidence of paravalvular leak or transvalvular regurgitation. The gradient recorded across the prosthetic aortic valve is within the expected range (TY 1.8 cm², mean 6 mmHg, peak 1.8 m/s, DI 0.57).   He is scheduled for 1 year post TAVR echo in the near future as ordered by CT surgery  He was reminded of the need for antibiotics prior to the dentist.   He offers no cardiac complaints     Persistent atrial fibrillation:   48 hr holter from May 2023 shows rates were controlled  He is on metoprolol tartrate 50 mg BID  Continue anticoagulation with Eliquis 5 mg BID  He has severe obstructive sleep apnea and is unable to tolerate a CPAP  Rates controlled on exam this AM     Chronic diastolic congestive heart failure:   Echo as above  He is on lasix 40 mg BID  Had +1 right LE and trace left LE edema that is pre-tibial; no c/o GONZALEZ/orthopnea and no other signs of CHF  Low sodium diet reinforced     Non-obstructive coronary artery disease:  Mild-moderate non-obstructive  disease noted on [facility] from March 2023. On aspirin and statin.  HgbA1c in diabetic range at 6.9 in October 2023 and does not appear to be treated  He has no cardiac complaints      Hypertension:   Controlled   Maintained on losartan 100 mg QD, lisinopril 20 mg QD, metoprolol tartrate 50 mg BID, lasix 40 mg BID and Nifedipine 90 mg QD  Low sodium diet reinforced     Dyslipidemia:   Lipid panel from October 2023  Triglycerides 73  HDL 84  LDL 53  Continue Crestor 20 mg QD and Zetia 10 mg QD    MARTINA  Cannot tolerate CPAP    DM  HgbA1c 6.9  Defer to PCP     He will return to the office in 6 months with Dr. Moya or sooner if necessary.  He will call with any concerns.    Interval History:   Phil Hanna . is a 78 y.o. male with severe aortic stenosis s/p recent TAVR 4/18/23,  persistent atrial fibrillation on anticoagulation, nonobstructive CAD, chronic diastolic congestive heart failure, hypertension, dyslipidemia, obesity who presents to the office today for follow up.  Today the patient offers no cardiac complaints.  He does not have a regular exercise routine but does state he is active at home working on his cars.  He does have stairs to climb in his home that he does several times a day.  With these activities he has no complaints of chest discomfort, dyspnea on exertion or palpitations.  He does take Lasix 40 mg twice daily and feels his lower extremity edema is controlled with this medication.  On exam he does have +1 right and trace left lower extremity edema that is pretibial in nature and low-sodium diet was reinforced.  He does not have any complaints of orthopnea.  He does not get dizzy or lightheaded and does not have any near-syncope or syncope.  His blood pressure is well-controlled today on his current medication regimen and as stated above low-sodium diet was again reinforced.  I did encourage regular cardiovascular exercise as this may assist as well.  His most recent LDL is at goal.  However, based  on hemoglobin A1c from October 2023 he is in the diabetic range but it appears that his PCP did offer him treatment with Januvia and the patient does not wish to pursue.  She did reinforce monitoring his diet and increasing his exercise.  He does have known persistent A-fib and his rates are controlled on exam today.  Overall, he is stable from a cardiac standpoint and will follow-up in our office in 6 months.    Medical Problems       Problem List       Severe aortic stenosis    Chronic diastolic CHF (congestive heart failure) ([facility])    Wt Readings from Last 3 Encounters:   04/11/24 113 kg (249 lb)   01/17/24 113 kg (248 lb 9.6 oz)   10/20/23 113 kg (248 lb 9.6 oz)                 Nonocclusive coronary atherosclerosis of native coronary artery    Dyslipidemia    Obstructive sleep apnea of adult    Paroxysmal atrial fibrillation (HCC)    Paroxysmal atrial tachycardia    Allergic rhinitis due to pollen    Erectile dysfunction    GERD without esophagitis    Class 2 severe obesity due to excess calories with serious comorbidity and body mass index (BMI) of 37.0 to 37.9 in adult (HCC)    Renovascular hypertension    Rheumatoid arthritis (HCC)    History of colon polyps    Mixed hyperlipidemia    Hypertensive heart disease with congestive heart failure (HCC)    Overview Signed 10/29/2021  6:28 AM by Patricia Arriaza     Added per ICD-10 guidelines; provider accepted         Wt Readings from Last 3 Encounters:   04/11/24 113 kg (249 lb)   01/17/24 113 kg (248 lb 9.6 oz)   10/20/23 113 kg (248 lb 9.6 oz)                 Daily consumption of alcohol    S/P TAVR (transcatheter aortic valve replacement)    Encounter for postoperative care        Past Medical History:   Diagnosis Date    Atrial fibrillation (HCC)     Colon polyp     Hyperlipidemia     Hypertension     Nonocclusive coronary atherosclerosis of native coronary artery 03/26/2015     Social History     Socioeconomic History    Marital status: /Civil Union      Spouse name: Not on file    Number of children: Not on file    Years of education: Not on file    Highest education level: Not on file   Occupational History    Not on file   Tobacco Use    Smoking status: Former    Smokeless tobacco: Former   Vaping Use    Vaping status: Never Used   Substance and Sexual Activity    Alcohol use: Yes     Alcohol/week: 4.0 standard drinks of alcohol     Types: 4 Cans of beer per week     Comment: daily    Drug use: No    Sexual activity: Not on file   Other Topics Concern    Not on file   Social History Narrative    Dental care, regularly    Good dental hygiene    No advance directives    No caffeine use    Uses safety equipment - Seatbelts      Social Determinants of Health     Financial Resource Strain: Low Risk  (9/13/2022)    Overall Financial Resource Strain (CARDIA)     Difficulty of Paying Living Expenses: Not very hard   Food Insecurity: No Food Insecurity (4/18/2023)    Hunger Vital Sign     Worried About Running Out of Food in the Last Year: Never true     Ran Out of Food in the Last Year: Never true   Transportation Needs: No Transportation Needs (4/18/2023)    PRAPARE - Transportation     Lack of Transportation (Medical): No     Lack of Transportation (Non-Medical): No   Physical Activity: Not on file   Stress: Not on file   Social Connections: Not on file   Intimate Partner Violence: Not on file   Housing Stability: Low Risk  (4/18/2023)    Housing Stability Vital Sign     Unable to Pay for Housing in the Last Year: No     Number of Places Lived in the Last Year: 1     Unstable Housing in the Last Year: No      Family History   Problem Relation Age of Onset    Diabetes Mother     Stroke Mother      Past Surgical History:   Procedure Laterality Date    APPENDECTOMY      BACK SURGERY  1996    disc repair    CARDIAC CATHETERIZATION      50% lesion of mid LAD, 40% lesion of first obtuse marginal lesion. Last assessed 1/17/2018     CARDIAC CATHETERIZATION N/A 3/13/2023     Procedure: Cardiac catheterization;  Surgeon: Amanda Mars DO;  Location: [facility];  Service: Cardiology    CARDIAC CATHETERIZATION N/A 3/13/2023    Procedure: Cardiac Coronary Angiogram;  Surgeon: Amanda Mars DO;  Location: [facility];  Service: Cardiology    CARDIAC CATHETERIZATION N/A 4/18/2023    Procedure: Cardiac tavr;  Surgeon: Handy García DO;  Location: [facility];  Service: Cardiology    CHOLECYSTECTOMY      COLON SURGERY      colon ressection    COLONOSCOPY N/A 01/14/2019    Procedure: COLONOSCOPY;  Surgeon: HAFSA Armijo MD;  Location: [facility];  Service: Colorectal    COLONOSCOPY  08/08/2022    CYSTOSCOPY      Diagnostic     HEMORRHOID SURGERY  08/2011    DC REPLACE AORTIC VALVE OPENFEMORAL ARTERY APPROACH N/A 4/18/2023    Procedure: REPLACEMENT AORTIC VALVE TRANSCATHETER (TAVR) TRANSFEMORAL W/ 26MM BROWN VANESA S3 ULTRA VALVE(ACCESS ON RIGHT) JC;  Surgeon: NILDA Garner MD;  Location: [facility];  Service: Cardiac Surgery    TONSILLECTOMY         Current Outpatient Medications:     Aspirin Low Dose 81 MG chewable tablet, Chew 1 tablet (81 mg total) daily., Disp: 30 tablet, Rfl: 4    Eliquis 5 MG, TAKE ONE TABLET BY MOUTH TWICE A DAY., Disp: 60 tablet, Rfl: 11    ezetimibe (ZETIA) 10 mg tablet, Take 1 tablet by mouth daily, Disp: 30 tablet, Rfl: 4    folic acid (FOLVITE) 1 mg tablet, TAKE (1) TABLET BY MOUTH DAILY., Disp: 30 tablet, Rfl: 4    furosemide (LASIX) 40 mg tablet, TAKE ONE TABLET BY MOUTH TWICE A DAY., Disp: 60 tablet, Rfl: 11    lisinopril (ZESTRIL) 20 mg tablet, TAKE (1) TABLET BY MOUTH TWICE DAILY., Disp: 60 tablet, Rfl: 4    losartan (COZAAR) 100 MG tablet, TAKE (1) TABLET BY MOUTH DAILY., Disp: 30 tablet, Rfl: 4    metoprolol tartrate (LOPRESSOR) 50 mg tablet, Take 1 tablet (50 mg total) by mouth every 12 (twelve) hours, Disp: 60 tablet, Rfl: 11    NIFEdipine (PROCARDIA XL) 90 mg 24 hr tablet, TAKE 1 TABLET DAILY., Disp: 30  "tablet, Rfl: 11    omeprazole (PriLOSEC) 20 mg delayed release capsule, TAKE (1) CAPSULE DAILY., Disp: 30 capsule, Rfl: 4    predniSONE 5 mg tablet, TAKE ONE TABLET DAILY., Disp: 30 tablet, Rfl: 4    rosuvastatin (CRESTOR) 20 MG tablet, TAKE ONE TABLET BY MOUTH DAILY., Disp: 30 tablet, Rfl: 11  No Known Allergies    Labs:     Chemistry        Component Value Date/Time     12/31/2015 0705    K 3.5 10/17/2023 0714    K 4.3 12/31/2015 0705     10/17/2023 0714     12/31/2015 0705    CO2 28 10/17/2023 0714    CO2 25 04/18/2023 1237    BUN 12 10/17/2023 0714    BUN 14 12/31/2015 0705    CREATININE 0.72 10/17/2023 0714    CREATININE 0.83 12/31/2015 0705        Component Value Date/Time    CALCIUM 9.2 10/17/2023 0714    CALCIUM 8.6 12/31/2015 0705    ALKPHOS 47 10/17/2023 0714    ALKPHOS 51 12/31/2015 0705    AST 35 10/17/2023 0714    AST 29 12/31/2015 0705    ALT 33 10/17/2023 0714    ALT 48 12/31/2015 0705    BILITOT 1.07 (H) 12/31/2015 0705            Lab Results   Component Value Date    CHOL 213 12/31/2015    CHOL 226 03/12/2015     Lab Results   Component Value Date    HDL 84 10/17/2023    HDL 98 05/12/2022    HDL 82 07/01/2020     Lab Results   Component Value Date    LDLCALC 53 10/17/2023    LDLCALC 39 05/12/2022    LDLCALC 54 07/01/2020     Lab Results   Component Value Date    TRIG 73 10/17/2023    TRIG 59 05/12/2022    TRIG 102 07/01/2020     No results found for: \"CHOLHDL\"    Imaging: No results found.      Review of Systems   Constitutional: Negative for chills, fever and malaise/fatigue.   HENT:  Negative for congestion.    Cardiovascular:  Negative for chest pain, dyspnea on exertion, leg swelling, orthopnea and palpitations.   Respiratory:  Negative for cough, shortness of breath (no SOB at rest) and wheezing.    Endocrine: Negative.    Hematologic/Lymphatic: Negative.    Skin: Negative.    Musculoskeletal: Negative.    Gastrointestinal:  Negative for bloating, abdominal pain, nausea and " "vomiting.   Genitourinary: Negative.    Neurological:  Negative for dizziness and light-headedness.   Psychiatric/Behavioral: Negative.     All other systems reviewed and are negative.      Vitals:    24 0839   BP: 110/70   Pulse: 93   SpO2: 98%     Vitals:    24 0839   Weight: 113 kg (249 lb)     Height: 5' 6\" (167.6 cm)   Body mass index is 40.19 kg/m².    Physical Exam:  Physical Exam  Vitals reviewed.   Constitutional:       General: He is not in acute distress.     Appearance: He is obese.   HENT:      Head: Normocephalic and atraumatic.      Mouth/Throat:      Mouth: Mucous membranes are moist.   Cardiovascular:      Rate and Rhythm: Normal rate. Rhythm irregularly irregular.      Heart sounds: Normal heart sounds, S1 normal and S2 normal. No murmur heard.  Pulmonary:      Effort: Pulmonary effort is normal. No respiratory distress.      Breath sounds: Normal breath sounds.   Abdominal:      General: Bowel sounds are normal. There is no distension.      Palpations: Abdomen is soft.   Musculoskeletal:         General: Normal range of motion.      Cervical back: Normal range of motion and neck supple.      Right lower le+ Edema (pre-tibial) present.      Left lower leg: Edema (trace pre-tibial) present.   Skin:     General: Skin is warm and dry.   Neurological:      Mental Status: He is alert and oriented to person, place, and time.   Psychiatric:         Mood and Affect: Mood normal.         "

## 2024-04-11 ENCOUNTER — OFFICE VISIT (OUTPATIENT)
Dept: CARDIOLOGY CLINIC | Facility: HOSPITAL | Age: 79
End: 2024-04-11
Payer: MEDICARE

## 2024-04-11 VITALS
HEIGHT: 66 IN | DIASTOLIC BLOOD PRESSURE: 70 MMHG | OXYGEN SATURATION: 98 % | WEIGHT: 249 LBS | SYSTOLIC BLOOD PRESSURE: 110 MMHG | HEART RATE: 93 BPM | BODY MASS INDEX: 40.02 KG/M2

## 2024-04-11 DIAGNOSIS — E11.9 TYPE 2 DIABETES MELLITUS WITHOUT COMPLICATION, UNSPECIFIED WHETHER LONG TERM INSULIN USE (HCC): ICD-10-CM

## 2024-04-11 DIAGNOSIS — I50.32 CHRONIC DIASTOLIC CHF (CONGESTIVE HEART FAILURE) (HCC): ICD-10-CM

## 2024-04-11 DIAGNOSIS — I10 BENIGN ESSENTIAL HTN: ICD-10-CM

## 2024-04-11 DIAGNOSIS — I35.0 SEVERE AORTIC STENOSIS: Primary | ICD-10-CM

## 2024-04-11 DIAGNOSIS — I25.10 NONOCCLUSIVE CORONARY ATHEROSCLEROSIS OF NATIVE CORONARY ARTERY: ICD-10-CM

## 2024-04-11 DIAGNOSIS — G47.33 OBSTRUCTIVE SLEEP APNEA OF ADULT: ICD-10-CM

## 2024-04-11 DIAGNOSIS — I48.19 PERSISTENT ATRIAL FIBRILLATION (HCC): ICD-10-CM

## 2024-04-11 DIAGNOSIS — E78.5 DYSLIPIDEMIA: ICD-10-CM

## 2024-04-11 DIAGNOSIS — Z95.2 S/P TAVR (TRANSCATHETER AORTIC VALVE REPLACEMENT): ICD-10-CM

## 2024-04-11 PROCEDURE — 99214 OFFICE O/P EST MOD 30 MIN: CPT | Performed by: NURSE PRACTITIONER

## 2024-04-12 DIAGNOSIS — I10 ESSENTIAL HYPERTENSION: ICD-10-CM

## 2024-04-12 RX ORDER — LOSARTAN POTASSIUM 100 MG/1
TABLET ORAL
Qty: 30 TABLET | Refills: 3 | Status: SHIPPED | OUTPATIENT
Start: 2024-04-12

## 2024-04-12 RX ORDER — LISINOPRIL 20 MG/1
TABLET ORAL
Qty: 60 TABLET | Refills: 4 | Status: SHIPPED | OUTPATIENT
Start: 2024-04-12

## 2024-04-23 ENCOUNTER — LAB (OUTPATIENT)
Dept: LAB | Facility: HOSPITAL | Age: 79
End: 2024-04-23
Attending: INTERNAL MEDICINE
Payer: MEDICARE

## 2024-04-23 ENCOUNTER — HOSPITAL ENCOUNTER (OUTPATIENT)
Dept: NON INVASIVE DIAGNOSTICS | Facility: HOSPITAL | Age: 79
Discharge: HOME | End: 2024-04-23
Payer: MEDICARE

## 2024-04-23 ENCOUNTER — APPOINTMENT (OUTPATIENT)
Dept: LAB | Facility: HOSPITAL | Age: 79
End: 2024-04-23
Payer: MEDICARE

## 2024-04-23 VITALS
WEIGHT: 249.12 LBS | HEART RATE: 91 BPM | BODY MASS INDEX: 40.04 KG/M2 | DIASTOLIC BLOOD PRESSURE: 68 MMHG | SYSTOLIC BLOOD PRESSURE: 136 MMHG | HEIGHT: 66 IN

## 2024-04-23 DIAGNOSIS — R73.9 HYPERGLYCEMIA: ICD-10-CM

## 2024-04-23 DIAGNOSIS — E78.5 HYPERLIPIDEMIA, UNSPECIFIED HYPERLIPIDEMIA TYPE: ICD-10-CM

## 2024-04-23 DIAGNOSIS — Z95.2 S/P TAVR (TRANSCATHETER AORTIC VALVE REPLACEMENT): ICD-10-CM

## 2024-04-23 DIAGNOSIS — Z09 FOLLOW-UP EXAM AFTER TREATMENT: Primary | ICD-10-CM

## 2024-04-23 DIAGNOSIS — I35.0 SEVERE AORTIC STENOSIS: ICD-10-CM

## 2024-04-23 DIAGNOSIS — E87.6 HYPOKALEMIA: Primary | ICD-10-CM

## 2024-04-23 DIAGNOSIS — E87.6 HYPOKALEMIA: ICD-10-CM

## 2024-04-23 LAB
ALBUMIN SERPL BCP-MCNC: 4.3 G/DL (ref 3.5–5)
ALP SERPL-CCNC: 49 U/L (ref 34–104)
ALT SERPL W P-5'-P-CCNC: 38 U/L (ref 7–52)
ANION GAP SERPL CALCULATED.3IONS-SCNC: 12 MMOL/L (ref 4–13)
AORTIC ROOT: 3 CM
AORTIC VALVE ANNULUS: 2.9 CM
AORTIC VALVE MEAN VELOCITY: 12.5 M/S
APICAL FOUR CHAMBER EJECTION FRACTION: 41 %
ASCENDING AORTA: 3.7 CM
AST SERPL W P-5'-P-CCNC: 44 U/L (ref 13–39)
AV AREA BY CONTINUOUS VTI: 1.8 CM2
AV AREA PEAK VELOCITY: 2 CM2
AV LVOT MEAN GRADIENT: 3 MMHG
AV LVOT PEAK GRADIENT: 6 MMHG
AV MEAN GRADIENT: 8 MMHG
AV PEAK GRADIENT: 14 MMHG
AV VALVE AREA: 1.79 CM2
AV VELOCITY RATIO: 0.64
BASOPHILS # BLD AUTO: 0.04 THOUSANDS/ÂΜL (ref 0–0.1)
BASOPHILS NFR BLD AUTO: 0 % (ref 0–1)
BILIRUB SERPL-MCNC: 2.18 MG/DL (ref 0.2–1)
BSA FOR ECHO PROCEDURE: 2.2 M2
BUN SERPL-MCNC: 15 MG/DL (ref 5–25)
CALCIUM SERPL-MCNC: 9.3 MG/DL (ref 8.4–10.2)
CHLORIDE SERPL-SCNC: 99 MMOL/L (ref 96–108)
CO2 SERPL-SCNC: 29 MMOL/L (ref 21–32)
CREAT SERPL-MCNC: 0.82 MG/DL (ref 0.6–1.3)
DOP CALC AO PEAK VEL: 1.87 M/S
DOP CALC AO VTI: 35.34 CM
DOP CALC LVOT AREA: 3.14 CM2
DOP CALC LVOT CARDIAC INDEX: 2.67 L/MIN/M2
DOP CALC LVOT CARDIAC OUTPUT: 5.87 L/MIN
DOP CALC LVOT DIAMETER: 2 CM
DOP CALC LVOT PEAK VEL VTI: 20.17 CM
DOP CALC LVOT PEAK VEL: 1.19 M/S
DOP CALC LVOT STROKE INDEX: 28.2 ML/M2
DOP CALC LVOT STROKE VOLUME: 63.33
E WAVE DECELERATION TIME: 144 MS
EOSINOPHIL # BLD AUTO: 0.27 THOUSAND/ÂΜL (ref 0–0.61)
EOSINOPHIL NFR BLD AUTO: 3 % (ref 0–6)
ERYTHROCYTE [DISTWIDTH] IN BLOOD BY AUTOMATED COUNT: 12.4 % (ref 11.6–15.1)
EST. AVERAGE GLUCOSE BLD GHB EST-MCNC: 157 MG/DL
FRACTIONAL SHORTENING: 32 (ref 28–44)
GFR SERPL CREATININE-BSD FRML MDRD: 85 ML/MIN/1.73SQ M
GLUCOSE P FAST SERPL-MCNC: 140 MG/DL (ref 65–99)
HBA1C MFR BLD: 7.1 %
HCT VFR BLD AUTO: 47.5 % (ref 36.5–49.3)
HGB BLD-MCNC: 15.6 G/DL (ref 12–17)
IMM GRANULOCYTES # BLD AUTO: 0.02 THOUSAND/UL (ref 0–0.2)
IMM GRANULOCYTES NFR BLD AUTO: 0 % (ref 0–2)
INTERVENTRICULAR SEPTUM IN DIASTOLE (PARASTERNAL SHORT AXIS VIEW): 1.1 CM
INTERVENTRICULAR SEPTUM: 1.1 CM (ref 0.6–1.1)
LAAS-AP2: 33.9 CM2
LAAS-AP4: 33.5 CM2
LDLC SERPL DIRECT ASSAY-MCNC: 53 MG/DL (ref 0–100)
LEFT ATRIUM SIZE: 6.1 CM
LEFT ATRIUM VOLUME (MOD BIPLANE): 137 ML
LEFT ATRIUM VOLUME INDEX (MOD BIPLANE): 62.3 ML/M2
LEFT INTERNAL DIMENSION IN SYSTOLE: 2.8 CM (ref 2.1–4)
LEFT VENTRICULAR INTERNAL DIMENSION IN DIASTOLE: 4.1 CM (ref 3.5–6)
LEFT VENTRICULAR POSTERIOR WALL IN END DIASTOLE: 1.1 CM
LEFT VENTRICULAR STROKE VOLUME: 45 ML
LVSV (TEICH): 45 ML
LYMPHOCYTES # BLD AUTO: 2.77 THOUSANDS/ÂΜL (ref 0.6–4.47)
LYMPHOCYTES NFR BLD AUTO: 31 % (ref 14–44)
MCH RBC QN AUTO: 28.7 PG (ref 26.8–34.3)
MCHC RBC AUTO-ENTMCNC: 32.8 G/DL (ref 31.4–37.4)
MCV RBC AUTO: 88 FL (ref 82–98)
MONOCYTES # BLD AUTO: 0.95 THOUSAND/ÂΜL (ref 0.17–1.22)
MONOCYTES NFR BLD AUTO: 11 % (ref 4–12)
MV E'TISSUE VEL-LAT: 10 CM/S
MV E'TISSUE VEL-SEP: 9 CM/S
MV PEAK E VEL: 127 CM/S
MV STENOSIS PRESSURE HALF TIME: 42 MS
MV VALVE AREA P 1/2 METHOD: 5.24
NEUTROPHILS # BLD AUTO: 4.84 THOUSANDS/ÂΜL (ref 1.85–7.62)
NEUTS SEG NFR BLD AUTO: 55 % (ref 43–75)
NRBC BLD AUTO-RTO: 0 /100 WBCS
PLATELET # BLD AUTO: 155 THOUSANDS/UL (ref 149–390)
PMV BLD AUTO: 10 FL (ref 8.9–12.7)
POTASSIUM SERPL-SCNC: 2.8 MMOL/L (ref 3.5–5.3)
PROT SERPL-MCNC: 7.5 G/DL (ref 6.4–8.4)
RBC # BLD AUTO: 5.43 MILLION/UL (ref 3.88–5.62)
RIGHT ATRIUM AREA SYSTOLE A4C: 21.5 CM2
RIGHT VENTRICLE ID DIMENSION: 4 CM
SINOTUBULAR JUNCTION: 2.6 CM
SL CV LEFT ATRIUM LENGTH A2C: 7.3 CM
SL CV LV EF: 65
SL CV PED ECHO LEFT VENTRICLE DIASTOLIC VOLUME (MOD BIPLANE) 2D: 75 ML
SL CV PED ECHO LEFT VENTRICLE SYSTOLIC VOLUME (MOD BIPLANE) 2D: 29 ML
SL CV SINUS OF VALSALVA 2D: 3.2 CM
SODIUM SERPL-SCNC: 140 MMOL/L (ref 135–147)
STJ: 2.6 CM
TR MAX PG: 33 MMHG
TR PEAK VELOCITY: 2.9 M/S
TRICUSPID ANNULAR PLANE SYSTOLIC EXCURSION: 2.2 CM
TRICUSPID VALVE PEAK E WAVE VELOCITY: 0.14 M/S
TRICUSPID VALVE PEAK REGURGITATION VELOCITY: 2.89 M/S
WBC # BLD AUTO: 8.89 THOUSAND/UL (ref 4.31–10.16)

## 2024-04-23 PROCEDURE — 83036 HEMOGLOBIN GLYCOSYLATED A1C: CPT

## 2024-04-23 PROCEDURE — 93306 TTE W/DOPPLER COMPLETE: CPT | Performed by: INTERNAL MEDICINE

## 2024-04-23 PROCEDURE — 93005 ELECTROCARDIOGRAM TRACING: CPT

## 2024-04-23 PROCEDURE — 93306 TTE W/DOPPLER COMPLETE: CPT

## 2024-04-23 PROCEDURE — 36415 COLL VENOUS BLD VENIPUNCTURE: CPT

## 2024-04-23 PROCEDURE — 80053 COMPREHEN METABOLIC PANEL: CPT

## 2024-04-23 PROCEDURE — 85025 COMPLETE CBC W/AUTO DIFF WBC: CPT

## 2024-04-23 PROCEDURE — 83721 ASSAY OF BLOOD LIPOPROTEIN: CPT

## 2024-04-23 RX ORDER — POTASSIUM CHLORIDE 20 MEQ/1
20 TABLET, EXTENDED RELEASE ORAL 2 TIMES DAILY
Qty: 30 TABLET | Refills: 0 | Status: SHIPPED | OUTPATIENT
Start: 2024-04-23

## 2024-04-24 DIAGNOSIS — E11.00 TYPE 2 DIABETES MELLITUS WITH HYPEROSMOLARITY WITHOUT COMA, WITHOUT LONG-TERM CURRENT USE OF INSULIN (HCC): Primary | ICD-10-CM

## 2024-04-26 LAB
ATRIAL RATE: 75 BPM
QRS AXIS: 39 DEGREES
QRSD INTERVAL: 106 MS
QT INTERVAL: 412 MS
QTC INTERVAL: 506 MS
T WAVE AXIS: -26 DEGREES
VENTRICULAR RATE: 91 BPM

## 2024-04-26 PROCEDURE — 93010 ELECTROCARDIOGRAM REPORT: CPT | Performed by: INTERNAL MEDICINE

## 2024-04-29 ENCOUNTER — APPOINTMENT (OUTPATIENT)
Dept: LAB | Facility: MEDICAL CENTER | Age: 79
End: 2024-04-29
Payer: MEDICARE

## 2024-04-29 LAB
ANION GAP SERPL CALCULATED.3IONS-SCNC: 11 MMOL/L (ref 4–13)
BUN SERPL-MCNC: 13 MG/DL (ref 5–25)
CALCIUM SERPL-MCNC: 8.9 MG/DL (ref 8.4–10.2)
CHLORIDE SERPL-SCNC: 100 MMOL/L (ref 96–108)
CO2 SERPL-SCNC: 30 MMOL/L (ref 21–32)
CREAT SERPL-MCNC: 0.9 MG/DL (ref 0.6–1.3)
GFR SERPL CREATININE-BSD FRML MDRD: 82 ML/MIN/1.73SQ M
GLUCOSE P FAST SERPL-MCNC: 135 MG/DL (ref 65–99)
POTASSIUM SERPL-SCNC: 3.9 MMOL/L (ref 3.5–5.3)
SODIUM SERPL-SCNC: 141 MMOL/L (ref 135–147)

## 2024-04-29 PROCEDURE — 36415 COLL VENOUS BLD VENIPUNCTURE: CPT

## 2024-04-29 PROCEDURE — 80048 BASIC METABOLIC PNL TOTAL CA: CPT

## 2024-05-20 ENCOUNTER — OFFICE VISIT (OUTPATIENT)
Dept: CARDIAC SURGERY | Facility: CLINIC | Age: 79
End: 2024-05-20
Payer: MEDICARE

## 2024-05-20 VITALS
SYSTOLIC BLOOD PRESSURE: 134 MMHG | OXYGEN SATURATION: 97 % | HEART RATE: 92 BPM | DIASTOLIC BLOOD PRESSURE: 78 MMHG | BODY MASS INDEX: 39.21 KG/M2 | WEIGHT: 244 LBS | HEIGHT: 66 IN

## 2024-05-20 DIAGNOSIS — Z95.2 S/P TAVR (TRANSCATHETER AORTIC VALVE REPLACEMENT): Primary | ICD-10-CM

## 2024-05-20 PROCEDURE — 99213 OFFICE O/P EST LOW 20 MIN: CPT | Performed by: PHYSICIAN ASSISTANT

## 2024-05-20 NOTE — PROGRESS NOTES
1 YEAR FOLLOW UP VISIT S/P TAVR    Procedure: S/P transcatheter aortic valve replacement, performed on 4/18/24.     History of Present Illness: Phil Hanna Sr. is a 79 y.o. year old male who presents to our office today for one year follow up care from transcatheter aortic valve replacement. He was last evaluated in our office in May of 2023. He was recovering well at that time, and felt better than prior to surgery. He has been doing well since that time, and has maintained follow up with his PCP and Dr. Moya of cardiology. He states that on occasion he will feel dyspneic with certain activities, and will occasionally have lower extremity edema in the morning depending on how much fluid he drinks. He remains on ASA, takes Eliquis for Afib, and is on furosemide 40 mg daily. He is accompanied today by his wife and daughter.     Review of Systems:  Review of Systems - History obtained from the patient  General ROS: negative  Psychological ROS: negative  Ophthalmic ROS: negative  ENT ROS: negative  Allergy and Immunology ROS: negative  Hematological and Lymphatic ROS: negative  Endocrine ROS: negative  Respiratory ROS: no cough, shortness of breath, or wheezing  Cardiovascular ROS: positive for - dyspnea on exertion and edema  Gastrointestinal ROS: no abdominal pain, change in bowel habits, or black or bloody stools  Genito-Urinary ROS: no dysuria, trouble voiding, or hematuria  Musculoskeletal ROS: negative  Neurological ROS: no TIA or stroke symptoms  Dermatological ROS: negative     Past Medical History:    Past Medical History:   Diagnosis Date    Atrial fibrillation (HCC)     Colon polyp     Hyperlipidemia     Hypertension     Nonocclusive coronary atherosclerosis of native coronary artery 03/26/2015       Past Surgical History:    Past Surgical History:   Procedure Laterality Date    APPENDECTOMY      BACK SURGERY  1996    disc repair    CARDIAC CATHETERIZATION      50% lesion of mid LAD, 40% lesion of first  "obtuse marginal lesion. Last assessed 1/17/2018     CARDIAC CATHETERIZATION N/A 3/13/2023    Procedure: Cardiac catheterization;  Surgeon: Amanda Mars DO;  [facility];  Service: Cardiology    CARDIAC CATHETERIZATION N/A 3/13/2023    Procedure: Cardiac Coronary Angiogram;  Surgeon: Amanda Mars DO;  [facility];  Service: Cardiology    CARDIAC CATHETERIZATION N/A 4/18/2023    Procedure: Cardiac tavr;  Surgeon: Handy García DO;  [facility];  Service: Cardiology    CHOLECYSTECTOMY      COLON SURGERY      colon ressection    COLONOSCOPY N/A 01/14/2019    Procedure: COLONOSCOPY;  Surgeon: HAFSA Armijo MD;  Location: [facility];  Service: Colorectal    COLONOSCOPY  08/08/2022    CYSTOSCOPY      Diagnostic     HEMORRHOID SURGERY  08/2011    OK REPLACE AORTIC VALVE OPENFEMORAL ARTERY APPROACH N/A 4/18/2023    Procedure: REPLACEMENT AORTIC VALVE TRANSCATHETER (TAVR) TRANSFEMORAL W/ 26MM BROWN VANESA S3 ULTRA VALVE(ACCESS ON RIGHT) JC;  Surgeon: NILDA Garner MD;  [facility];  Service: Cardiac Surgery    TONSILLECTOMY         Vital Signs:     Vitals:    05/20/24 1228 05/20/24 1231   BP: 129/86 134/78   BP Location: Right arm Left arm   Patient Position: Sitting Sitting   Cuff Size: Standard Standard   Pulse: 92    SpO2: 97%    Weight: 111 kg (244 lb)    Height: 5' 6\" (1.676 m)          Home Medications:     Prior to Admission medications    Medication Sig Start Date End Date Taking? Authorizing Provider   Aspirin Low Dose 81 MG chewable tablet Chew 1 tablet (81 mg total) daily. 10/12/23  Yes Nely Valencia DO   Eliquis 5 MG TAKE ONE TABLET BY MOUTH TWICE A DAY. 7/14/23  Yes JACQUIE Jurado   ezetimibe (ZETIA) 10 mg tablet Take 1 tablet by mouth daily 5/4/23  Yes Nely Valencia DO   folic acid (FOLVITE) 1 mg tablet TAKE (1) TABLET BY MOUTH DAILY. 3/15/24  Yes Nely Valencia DO   furosemide (LASIX) 40 mg tablet TAKE ONE " TABLET BY MOUTH TWICE A DAY. 10/13/23  Yes Tigist Farnsworth PA-C   lisinopril (ZESTRIL) 20 mg tablet TAKE (1) TABLET BY MOUTH TWICE DAILY. 4/12/24  Yes Nely Valencia DO   losartan (COZAAR) 100 MG tablet TAKE (1) TABLET BY MOUTH DAILY. 4/12/24  Yes Nely Valencia DO   metoprolol tartrate (LOPRESSOR) 50 mg tablet Take 1 tablet (50 mg total) by mouth every 12 (twelve) hours 10/13/23  Yes Tigist Farnsworth PA-C   NIFEdipine (PROCARDIA XL) 90 mg 24 hr tablet TAKE 1 TABLET DAILY. 10/13/23  Yes Tigist Farnsworth PA-C   omeprazole (PriLOSEC) 20 mg delayed release capsule TAKE (1) CAPSULE DAILY. 3/15/24  Yes Nely Valencia DO   potassium chloride (Klor-Con M20) 20 mEq tablet Take 1 tablet (20 mEq total) by mouth 2 (two) times a day 4/23/24  Yes Nely Valencia DO   predniSONE 5 mg tablet TAKE ONE TABLET DAILY. 3/15/24  Yes Nely Valencia DO   rosuvastatin (CRESTOR) 20 MG tablet TAKE ONE TABLET BY MOUTH DAILY. 3/15/24  Yes Naima Moya DO       Allergies:    No Known Allergies    Physical Exam:    General: alert, oriented, NAD   HEENT/NECK:  PERRL.  No jugular venous distention.    Cardiac:Irregular rhythm.  Pulmonary:Breath sounds clear bilaterally  Abdomen:  Non-tender, Non-distended.  Positive bowel sounds.  Upper extremities: 2+ radial pulses; brisk capillary refill  Lower extremities: Extremities warm/dry. PT/DP pulses 2+ bilaterally.  Trace edema B/L  Musculoskeletal: DUMAS   Neuro: Alert and oriented X 3.  Sensation is grossly intact.  No focal deficits  Skin: Warm/Dry, without rashes or lesions.    Lab Results:   Lab Results   Component Value Date    WBC 8.89 04/23/2024    HGB 15.6 04/23/2024    HCT 47.5 04/23/2024    MCV 88 04/23/2024     04/23/2024     Lab Results   Component Value Date    GLUCOSE 158 (H) 04/18/2023    CALCIUM 8.9 04/29/2024     12/31/2015    K 3.9 04/29/2024    CO2 30 04/29/2024     04/29/2024    BUN 13 04/29/2024    CREATININE 0.90 04/29/2024       Imaging Studies:      Echocardiogram:24  Left Ventricle Left ventricular cavity size is normal. Wall thickness is mildly increased. There is concentric remodeling. The left ventricular ejection fraction is 65%. Systolic function is normal. Although no diagnostic regional wall motion abnormality was identified, this possibility cannot be completely excluded on the basis of this study. Unable to assess diastolic function due to atrial fibrillation.   Right Ventricle Right ventricular cavity size is mildly dilated. Systolic function is normal. Wall thickness is normal.   Left Atrium The atrium is severely dilated (>48 mL/m2).   Right Atrium The atrium is mildly dilated.   Aortic Valve There is an Clark VANESA 3 Ultra 26 mm TAVR bioprosthetic valve. There is no evidence of paravalvular regurgitation. There is no evidence of transvalvular regurgitation. The gradient recorded across the prosthetic aortic valve is within the expected range. The aortic valve mean gradient is 8 mmHg. The dimensionless velocity index is 0.64. The aortic valve area is 1.79 cm2.   Mitral Valve There is moderate annular calcification.  There is mild regurgitation. There is no evidence of stenosis.   Tricuspid Valve Tricuspid valve structure is normal. There is mild regurgitation. There is no evidence of stenosis.   Pulmonic Valve Pulmonic valve structure is normal. There is mild regurgitation. There is no evidence of stenosis.   Ascending Aorta The aortic root is normal in size.   IVC/SVC The inferior vena cava is normal in size. Respirophasic changes were normal.   Pericardium There is no pericardial effusion. The pericardium is normal in appearance.       EK24  Atrial fibrillation  Abnormal QRS-T angle, consider primary T wave abnormality  Abnormal ECG    I have personally reviewed pertinent reports.      TAVR evaluation Assessment:     [facility]: I    KCCQ-12 completed     Assessment:     Aortic stenosis, Non-Rheumatic.   S/P transcatheter aortic  valve replacement;      Phil Hanna Sr. returns to our office today for 1 year routine follow up s/p  transcatheter aortic valve replacement .  Their NYHA functional status is class I.   Recent echocardiogram demonstrates well-seated TAVR valve with normal function, no PVL, no transvalvular leak.   ECG, CBC and BMP are reviewed and WNL.     Plan:     I reviewed test results with patient.  I reviewed medications and made no changes. Continue Aspirin 81 mg daily, lifelong, for antiplatelet therapy for bioprosthetic valve.      Phil Hanna Sr. does not need to return to our office for follow up in the future but will continue to be managed by their primary care physician and cardiologist for ongoing medical care. We recommend yearly echocardiograms which can be ordered and monitored by their cardiologist.  Phil Hanna Sr. was comfortable with our recommendations and their questions were answered to their satisfaction.    Routine referral to gastroenterology for colonoscopy screening was not indicated, as the patient is over 75 years old    Flakita Rehman PA-C  [unfilled]  1:15 PM

## 2024-06-03 ENCOUNTER — CONSULT (OUTPATIENT)
Dept: ENDOCRINOLOGY | Facility: CLINIC | Age: 79
End: 2024-06-03
Payer: MEDICARE

## 2024-06-03 VITALS
HEART RATE: 62 BPM | SYSTOLIC BLOOD PRESSURE: 130 MMHG | WEIGHT: 248 LBS | DIASTOLIC BLOOD PRESSURE: 58 MMHG | HEIGHT: 69 IN | BODY MASS INDEX: 36.73 KG/M2

## 2024-06-03 DIAGNOSIS — E78.2 MIXED HYPERLIPIDEMIA: ICD-10-CM

## 2024-06-03 DIAGNOSIS — I10 PRIMARY HYPERTENSION: ICD-10-CM

## 2024-06-03 DIAGNOSIS — E66.01 CLASS 2 SEVERE OBESITY DUE TO EXCESS CALORIES WITH SERIOUS COMORBIDITY AND BODY MASS INDEX (BMI) OF 37.0 TO 37.9 IN ADULT (HCC): ICD-10-CM

## 2024-06-03 DIAGNOSIS — E11.00 TYPE 2 DIABETES MELLITUS WITH HYPEROSMOLARITY WITHOUT COMA, WITHOUT LONG-TERM CURRENT USE OF INSULIN (HCC): Primary | ICD-10-CM

## 2024-06-03 PROCEDURE — 99204 OFFICE O/P NEW MOD 45 MIN: CPT | Performed by: STUDENT IN AN ORGANIZED HEALTH CARE EDUCATION/TRAINING PROGRAM

## 2024-06-03 RX ORDER — LANCETS 33 GAUGE
EACH MISCELLANEOUS
Qty: 100 EACH | Refills: 3 | Status: SHIPPED | OUTPATIENT
Start: 2024-06-03

## 2024-06-03 RX ORDER — BLOOD-GLUCOSE METER
KIT MISCELLANEOUS
Qty: 1 KIT | Refills: 0 | Status: SHIPPED | OUTPATIENT
Start: 2024-06-03

## 2024-06-03 RX ORDER — BLOOD SUGAR DIAGNOSTIC
STRIP MISCELLANEOUS
Qty: 100 EACH | Refills: 3 | Status: SHIPPED | OUTPATIENT
Start: 2024-06-03

## 2024-06-03 NOTE — PROGRESS NOTES
Phil Hanna Sr. 79 y.o. male MRN: [id number]    Encounter: [id number]      Assessment & Plan     Problem List Items Addressed This Visit     Primary hypertension     There is a history of hypertension and hypokalemia. I will request labs for renin:dylan         Relevant Orders    Aldosterone/Renin Ratio    Basic metabolic panel    Class 2 severe obesity due to excess calories with serious comorbidity and body mass index (BMI) of 37.0 to 37.9 in adult ([facility])     Will monitor         Mixed hyperlipidemia     Well controlled. Continue statin               Type 2 diabetes mellitus with hyperosmolarity without coma, without long-term current use of insulin ([facility]) - Primary     We discussed the pathophysiology of diabetes and its associations with negative health outcomes.  I counseled the patient on various goals of diabetes management, including healthy lifestyle and behaviors, glycemic targets, preventative health care, and the role of pharmacotherapies. A1c is in reasonable control for age. I did discuss non-pharmacologic and pharmacologic approaches to diabetes. Dale would be acceptable for consideration of therapy with metformin, although possibly SGLT2i as well given history of hypertension and CHF. He would prefer against therapy at present. I have provided Dale with a script for glucometer with instructions for testing. We will reassess control in a 4-mo interval.          Relevant Medications    Blood Glucose Monitoring Suppl (OneTouch Verio Reflect) w/Device KIT    glucose blood (OneTouch Verio) test strip    OneTouch Delica Lancets 33G MISC    Other Relevant Orders    Hemoglobin A1C    Basic metabolic panel     RTC 4-mo      CC: Diabetes    History of Present Illness     HPI:    Dale presents today for evaluation of type 2 diabetes.  He reports recent history of type 2 diabetes diagnosed by elevated A1c.  He denies any known health complications related to diabetes.  He denies any neuropathy.  He follows with [facility] [place] for an updated DM eye exam.  He recently had an A1c checked and it was 7.1%. He is not on pharmacotherapy for diabetes.     Dale has a history of hypertension and hyperlipidemia.  He has a history of aortic stenosis and is s/p TAVR. He may have mild CAD. No history of advanced kidney disease.    Dale is on prednisone 5 mg daily for gout. Prednisone has been very helpful.    Diet is mostly home cooked. Dale's wife is diabetic and is treated with weekly GLP. aDle may eat out twice per month. No sugary beverages, although he does consume roughly 6-8 beers per day. He does not like chicken, but will eat red meat instead.      He does have a history of low potassium and is on supplementation.     Review of Systems   Constitutional:  Negative for diaphoresis and unexpected weight change.   Endocrine: Negative for polydipsia and polyuria.   All other systems reviewed and are negative.      Historical Information   Past Medical History:   Diagnosis Date   • Atrial fibrillation (HCC)    • Colon polyp    • Hyperlipidemia    • Hypertension    • Nonocclusive coronary atherosclerosis of native coronary artery 03/26/2015     Past Surgical History:   Procedure Laterality Date   • APPENDECTOMY     • BACK SURGERY  1996    disc repair   • CARDIAC CATHETERIZATION      50% lesion of mid LAD, 40% lesion of first obtuse marginal lesion. Last assessed 1/17/2018    • CARDIAC CATHETERIZATION N/A 3/13/2023    Procedure: Cardiac catheterization;  Surgeon: Amanda Mars DO;  [facility];  Service: Cardiology   • CARDIAC CATHETERIZATION N/A 3/13/2023    Procedure: Cardiac Coronary Angiogram;  Surgeon: Amanda Mars DO;  [facility];  Service: Cardiology   • CARDIAC CATHETERIZATION N/A 4/18/2023    Procedure: Cardiac tavr;  Surgeon: Handy García DO;  [facility];  Service: Cardiology   • CHOLECYSTECTOMY     • COLON SURGERY      colon ressection   • COLONOSCOPY N/A  01/14/2019    Procedure: COLONOSCOPY;  Surgeon: HAFSA Armijo MD;  [facility];  Service: Colorectal   • COLONOSCOPY  08/08/2022   • CYSTOSCOPY      Diagnostic    • HEMORRHOID SURGERY  08/2011   • ME REPLACE AORTIC VALVE OPENFEMORAL ARTERY APPROACH N/A 4/18/2023    Procedure: REPLACEMENT AORTIC VALVE TRANSCATHETER (TAVR) TRANSFEMORAL W/ 26MM BROWN VANESA S3 ULTRA VALVE(ACCESS ON RIGHT) JC;  Surgeon: NILDA Garner MD;  Location:  MAIN OR;  Service: Cardiac Surgery   • TONSILLECTOMY       Social History   Social History     Substance and Sexual Activity   Alcohol Use Yes   • Alcohol/week: 4.0 standard drinks of alcohol   • Types: 4 Cans of beer per week    Comment: daily     Social History     Substance and Sexual Activity   Drug Use No     Social History     Tobacco Use   Smoking Status Former   Smokeless Tobacco Former     Family History:   Family History   Problem Relation Age of Onset   • Diabetes Mother    • Stroke Mother        Meds/Allergies   Current Outpatient Medications   Medication Sig Dispense Refill   • Aspirin Low Dose 81 MG chewable tablet Chew 1 tablet (81 mg total) daily. 30 tablet 4   • Blood Glucose Monitoring Suppl (OneTouch Verio Reflect) w/Device KIT Check blood sugars once daily. Please substitute with appropriate alternative as covered by patient's insurance. Dx: E11.65 1 kit 0   • Eliquis 5 MG TAKE ONE TABLET BY MOUTH TWICE A DAY. 60 tablet 11   • ezetimibe (ZETIA) 10 mg tablet Take 1 tablet by mouth daily 30 tablet 4   • folic acid (FOLVITE) 1 mg tablet TAKE (1) TABLET BY MOUTH DAILY. 30 tablet 4   • furosemide (LASIX) 40 mg tablet TAKE ONE TABLET BY MOUTH TWICE A DAY. 60 tablet 11   • glucose blood (OneTouch Verio) test strip Check blood sugars once daily. Please substitute with appropriate alternative as covered by patient's insurance. Dx: E11.65 100 each 3   • lisinopril (ZESTRIL) 20 mg tablet TAKE (1) TABLET BY MOUTH TWICE DAILY. 60 tablet 4   • losartan  "(COZAAR) 100 MG tablet TAKE (1) TABLET BY MOUTH DAILY. 30 tablet 3   • metoprolol tartrate (LOPRESSOR) 50 mg tablet Take 1 tablet (50 mg total) by mouth every 12 (twelve) hours 60 tablet 11   • omeprazole (PriLOSEC) 20 mg delayed release capsule TAKE (1) CAPSULE DAILY. 30 capsule 4   • OneTouch Delica Lancets 33G MISC Check blood sugars once daily. Please substitute with appropriate alternative as covered by patient's insurance. Dx: E11.65 100 each 3   • potassium chloride (Klor-Con M20) 20 mEq tablet Take 1 tablet (20 mEq total) by mouth 2 (two) times a day 30 tablet 0   • predniSONE 5 mg tablet TAKE ONE TABLET DAILY. 30 tablet 4   • rosuvastatin (CRESTOR) 20 MG tablet TAKE ONE TABLET BY MOUTH DAILY. 30 tablet 11   • NIFEdipine (PROCARDIA XL) 90 mg 24 hr tablet TAKE 1 TABLET DAILY. 30 tablet 11     No current facility-administered medications for this visit.     No Known Allergies    Objective   Vitals: Blood pressure 130/58, pulse 62, height 5' 9\" (1.753 m), weight 112 kg (248 lb).    Physical Exam  Vitals reviewed.   Constitutional:       Appearance: Normal appearance.   HENT:      Head: Normocephalic and atraumatic.      Nose: Nose normal.   Eyes:      General: No scleral icterus.     Conjunctiva/sclera: Conjunctivae normal.   Pulmonary:      Effort: Pulmonary effort is normal. No respiratory distress.   Abdominal:      Palpations: Abdomen is soft.      Tenderness: There is no abdominal tenderness.   Musculoskeletal:         General: No deformity.      Cervical back: Normal range of motion.   Skin:     General: Skin is warm and dry.   Neurological:      General: No focal deficit present.      Mental Status: He is alert.   Psychiatric:         Mood and Affect: Mood normal.         Behavior: Behavior normal.         The history was obtained from the review of the chart, patient.    Lab Results:   Lab Results   Component Value Date/Time    Hemoglobin A1C 7.1 (H) 04/23/2024 01:40 PM    Hemoglobin A1C 6.9 (H) " "10/17/2023 07:14 AM    WBC 8.89 04/23/2024 01:40 PM    Hemoglobin 15.6 04/23/2024 01:40 PM    Hematocrit 47.5 04/23/2024 01:40 PM    MCV 88 04/23/2024 01:40 PM    Platelets 155 04/23/2024 01:40 PM    BUN 13 04/29/2024 08:53 AM    BUN 15 04/23/2024 01:40 PM    BUN 12 10/17/2023 07:14 AM    Potassium 3.9 04/29/2024 08:53 AM    Potassium 2.8 (L) 04/23/2024 01:40 PM    Potassium 3.5 10/17/2023 07:14 AM    Chloride 100 04/29/2024 08:53 AM    Chloride 99 04/23/2024 01:40 PM    Chloride 101 10/17/2023 07:14 AM    CO2 30 04/29/2024 08:53 AM    CO2 29 04/23/2024 01:40 PM    CO2 28 10/17/2023 07:14 AM    Creatinine 0.90 04/29/2024 08:53 AM    Creatinine 0.82 04/23/2024 01:40 PM    Creatinine 0.72 10/17/2023 07:14 AM    AST 44 (H) 04/23/2024 01:40 PM    AST 35 10/17/2023 07:14 AM    ALT 38 04/23/2024 01:40 PM    ALT 33 10/17/2023 07:14 AM    Total Protein 7.5 04/23/2024 01:40 PM    Total Protein 7.4 10/17/2023 07:14 AM    Albumin 4.3 04/23/2024 01:40 PM    Albumin 4.3 10/17/2023 07:14 AM    HDL, Direct 84 10/17/2023 07:14 AM    Triglycerides 73 10/17/2023 07:14 AM           Imaging Studies: I have personally reviewed pertinent reports.      Portions of the record may have been created with voice recognition software. Occasional wrong word or \"sound a like\" substitutions may have occurred due to the inherent limitations of voice recognition software. Read the chart carefully and recognize, using context, where substitutions have occurred.    "

## 2024-06-03 NOTE — ASSESSMENT & PLAN NOTE
We discussed the pathophysiology of diabetes and its associations with negative health outcomes.  I counseled the patient on various goals of diabetes management, including healthy lifestyle and behaviors, glycemic targets, preventative health care, and the role of pharmacotherapies. A1c is in reasonable control for age. I did discuss non-pharmacologic and pharmacologic approaches to diabetes. Dale would be acceptable for consideration of therapy with metformin, although possibly SGLT2i as well given history of hypertension and CHF. He would prefer against therapy at present. I have provided Dale with a script for glucometer with instructions for testing. We will reassess control in a 4-mo interval.

## 2024-06-03 NOTE — PATIENT INSTRUCTIONS
Check sugars daily    Best times to monitor are on waking, before meals or bedtime  Ok to alternate different times of day    Goal Blood Sugars:   Premeal , even better <110  2hr after a meal <180, even better <140  A1C <7%, even better <6.5%.    Aim for 45g carbohydrates with meals  15-20g with snacks    Goal exercise is 30 minutes daily, most days of the week    Please have labs done before next visit    Bring your meter or logbook with you each visit    RTC 4-months

## 2024-07-02 ENCOUNTER — RA CDI HCC (OUTPATIENT)
Dept: OTHER | Facility: HOSPITAL | Age: 79
End: 2024-07-02

## 2024-07-29 ENCOUNTER — OFFICE VISIT (OUTPATIENT)
Dept: FAMILY MEDICINE CLINIC | Facility: CLINIC | Age: 79
End: 2024-07-29
Payer: MEDICARE

## 2024-07-29 VITALS
HEIGHT: 69 IN | HEART RATE: 76 BPM | BODY MASS INDEX: 36.23 KG/M2 | SYSTOLIC BLOOD PRESSURE: 126 MMHG | TEMPERATURE: 97.5 F | RESPIRATION RATE: 18 BRPM | OXYGEN SATURATION: 96 % | WEIGHT: 244.6 LBS | DIASTOLIC BLOOD PRESSURE: 70 MMHG

## 2024-07-29 DIAGNOSIS — E11.65 TYPE 2 DIABETES MELLITUS WITH HYPERGLYCEMIA, WITHOUT LONG-TERM CURRENT USE OF INSULIN (HCC): Primary | ICD-10-CM

## 2024-07-29 DIAGNOSIS — Z00.00 MEDICARE ANNUAL WELLNESS VISIT, SUBSEQUENT: ICD-10-CM

## 2024-07-29 LAB
LEFT EYE DIABETIC RETINOPATHY: ABNORMAL
LEFT EYE IMAGE QUALITY: ABNORMAL
LEFT EYE MACULAR EDEMA: ABNORMAL
LEFT EYE OTHER RETINOPATHY: ABNORMAL
RIGHT EYE DIABETIC RETINOPATHY: ABNORMAL
RIGHT EYE IMAGE QUALITY: ABNORMAL
RIGHT EYE MACULAR EDEMA: ABNORMAL
RIGHT EYE OTHER RETINOPATHY: ABNORMAL
SEVERITY (EYE EXAM): ABNORMAL

## 2024-07-29 PROCEDURE — G0439 PPPS, SUBSEQ VISIT: HCPCS | Performed by: INTERNAL MEDICINE

## 2024-07-29 PROCEDURE — 92250 FUNDUS PHOTOGRAPHY W/I&R: CPT | Performed by: INTERNAL MEDICINE

## 2024-07-29 NOTE — PROGRESS NOTES
Ambulatory Visit  Name: Phil Hanna Sr.      : 1945      MRN: [id number]  Encounter Provider: Nely Valencia DO  Encounter Date: 2024   Encounter department: [facility]    Assessment & Plan   1. Medicare annual wellness visit, subsequent  Pt generally well  He is following with endocrine and has appt for recheck with labs in Fall  Stay hydrated  Cardio followup in . Type 2 diabetes mellitus with hyperglycemia, without long-term current use of insulin (HCC)  -     Albumin / creatinine urine ratio; Future  Lo crb diet  Has endocrine followup Fall  [place]ris today       Depression Screening and Follow-up Plan: Patient was screened for depression during today's encounter. They screened negative with a PHQ-2 score of 0.    Preventive health issues were discussed with patient, and age appropriate screening tests were ordered as noted in patient's After Visit Summary. Personalized health advice and appropriate referrals for health education or preventive services given if needed, as noted in patient's After Visit Summary.    History of Present Illness     HPI   Pt generally feels well He did see Dr Slater and is trying to limit carbs since A!c was higher   Patient Care Team:  Nely Valencia DO as PCP - General  DO Nely Prescott DO W Terence Reilly, MD as Endoscopist    Review of Systems   Constitutional:  Negative for chills and fever.   HENT: Negative.     Eyes:  Negative for visual disturbance.   Respiratory:  Negative for cough and shortness of breath.    Cardiovascular:  Negative for chest pain, palpitations and leg swelling.   Gastrointestinal: Negative.    Genitourinary: Negative.    Musculoskeletal: Negative.    Neurological:  Negative for dizziness, light-headedness and headaches.   Psychiatric/Behavioral:  Negative for sleep disturbance. The patient is not nervous/anxious.      Past Medical History:   Diagnosis Date   • Atrial fibrillation (HCC)    • Colon  polyp    • Hyperlipidemia    • Hypertension    • Nonocclusive coronary atherosclerosis of native coronary artery 03/26/2015     Past Surgical History:   Procedure Laterality Date   • APPENDECTOMY     • BACK SURGERY  1996    disc repair   • CARDIAC CATHETERIZATION      50% lesion of mid LAD, 40% lesion of first obtuse marginal lesion. Last assessed 1/17/2018    • CARDIAC CATHETERIZATION N/A 3/13/2023    Procedure: Cardiac catheterization;  Surgeon: Amanda Mars DO;  Location: [facility];  Service: Cardiology   • CARDIAC CATHETERIZATION N/A 3/13/2023    Procedure: Cardiac Coronary Angiogram;  Surgeon: Amanda Mars DO;  Location: [facility];  Service: Cardiology   • CARDIAC CATHETERIZATION N/A 4/18/2023    Procedure: Cardiac tavr;  Surgeon: Handy García DO;  Location: [facility];  Service: Cardiology   • CHOLECYSTECTOMY     • COLON SURGERY      colon ressection   • COLONOSCOPY N/A 01/14/2019    Procedure: COLONOSCOPY;  Surgeon: HAFSA Armijo MD;  Location: [facility];  Service: Colorectal   • COLONOSCOPY  08/08/2022   • CYSTOSCOPY      Diagnostic    • HEMORRHOID SURGERY  08/2011   • IL REPLACE AORTIC VALVE OPENFEMORAL ARTERY APPROACH N/A 4/18/2023    Procedure: REPLACEMENT AORTIC VALVE TRANSCATHETER (TAVR) TRANSFEMORAL W/ 26MM BROWN VANESA S3 ULTRA VALVE(ACCESS ON RIGHT) JC;  Surgeon: NILDA Garner MD;  Location: [facility];  Service: Cardiac Surgery   • TONSILLECTOMY       Social History     Socioeconomic History   • Marital status: /Civil Union     Spouse name: Not on file   • Number of children: Not on file   • Years of education: Not on file   • Highest education level: Not on file   Occupational History   • Not on file   Tobacco Use   • Smoking status: Former   • Smokeless tobacco: Former   Vaping Use   • Vaping status: Never Used   Substance and Sexual Activity   • Alcohol use: Yes     Alcohol/week: 4.0 standard drinks of alcohol     Types: 4 Cans of  beer per week     Comment: daily   • Drug use: No   • Sexual activity: Not on file   Other Topics Concern   • Not on file   Social History Narrative    Dental care, regularly    Good dental hygiene    No advance directives    No caffeine use    Uses safety equipment - Seatbelts      Social Determinants of Health     Financial Resource Strain: Low Risk  (9/13/2022)    Overall Financial Resource Strain (CARDIA)    • Difficulty of Paying Living Expenses: Not very hard   Food Insecurity: No Food Insecurity (7/29/2024)    Hunger Vital Sign    • Worried About Running Out of Food in the Last Year: Never true    • Ran Out of Food in the Last Year: Never true   Transportation Needs: No Transportation Needs (7/29/2024)    PRAPARE - Transportation    • Lack of Transportation (Medical): No    • Lack of Transportation (Non-Medical): No   Physical Activity: Not on file   Stress: Not on file   Social Connections: Not on file   Intimate Partner Violence: Not on file   Housing Stability: Unknown (7/29/2024)    Housing Stability Vital Sign    • Unable to Pay for Housing in the Last Year: No    • Number of Times Moved in the Last Year: Not on file    • Homeless in the Last Year: No       No Known Allergies  Diabetic Foot Exam    Patient's shoes and socks removed.    Right Foot/Ankle   Right Foot Inspection  Skin Exam: skin normal and skin intact. No dry skin, no warmth, no callus, no erythema, no maceration, no abnormal color, no pre-ulcer, no ulcer and no callus.     Toe Exam: ROM and strength within normal limits.     Sensory   Vibration: intact  Monofilament testing: intact    Vascular  The right DP pulse is 2+. The right PT pulse is 2+.     Left Foot/Ankle  Left Foot Inspection      Toe Exam: ROM and strength within normal limits.     Sensory   Vibration: intact  Monofilament testing: intact    Vascular  The left DP pulse is 2+. The left PT pulse is 2+.     Assign Risk Category  No deformity present  No loss of protective  sensation  No weak pulses  Risk: 0  Medical History Reviewed by provider this encounter:  Problems       Annual Wellness Visit Questionnaire   Phil is here for his Subsequent Wellness visit. Last Medicare Wellness visit information reviewed, patient interviewed, no change since last AWV.     Health Risk Assessment:   Patient rates overall health as good. Patient feels that their physical health rating is same. Patient is very satisfied with their life. Eyesight was rated as same. Hearing was rated as same. Patient feels that their emotional and mental health rating is same. Patients states they are never, rarely angry. Patient states they are sometimes unusually tired/fatigued. Pain experienced in the last 7 days has been some. Patient's pain rating has been 7/10. Patient states that he has experienced no weight loss or gain in last 6 months.     Depression Screening:   PHQ-2 Score: 0      Fall Risk Screening:   In the past year, patient has experienced: no history of falling in past year      Home Safety:  Patient does not have trouble with stairs inside or outside of their home. Patient has working smoke alarms and has working carbon monoxide detector. Home safety hazards include: none.     Nutrition:   Current diet is Regular.     Medications:   Patient is currently taking over-the-counter supplements. OTC medications include: see medication list. Patient is able to manage medications.     Activities of Daily Living (ADLs)/Instrumental Activities of Daily Living (IADLs):   Walk and transfer into and out of bed and chair?: Yes  Dress and groom yourself?: Yes    Bathe or shower yourself?: Yes    Feed yourself? Yes  Do your laundry/housekeeping?: Yes  Manage your money, pay your bills and track your expenses?: Yes  Make your own meals?: Yes    Do your own shopping?: Yes    Durable Medical Equipment Suppliers  I    Previous Hospitalizations:   Any hospitalizations or ED visits within the last 12 months?: No       Advance Care Planning:   Living will: Yes    Durable POA for healthcare: Yes    Advanced directive: Yes    End of Life Decisions reviewed with patient: Yes    Provider agrees with end of life decisions: Yes      Cognitive Screening:   Provider or family/friend/caregiver concerned regarding cognition?: No    PREVENTIVE SCREENINGS      Cardiovascular Screening:    General: History Lipid Disorder and Screening Current      Diabetes Screening:     General: History Diabetes and Screening Current      Colorectal Cancer Screening:     General: Screening Current      Prostate Cancer Screening:    General: Screening Not Indicated      Osteoporosis Screening:    General: Screening Not Indicated      Abdominal Aortic Aneurysm (AAA) Screening:    Risk factors include: tobacco use        General: Screening Current      Lung Cancer Screening:     General: Screening Not Indicated      Hepatitis C Screening:    General: Screening Current    Screening, Brief Intervention, and Referral to Treatment (SBIRT)    Screening  Typical number of drinks in a day: 6  Typical number of drinks in a week: 8  Interpretation: Risky drinking behavior.    AUDIT-C Screenin) How often did you have a drink containing alcohol in the past year? 4 or more times a week  2) How many drinks did you have on a typical day when you were drinking in the past year? 3 to 4  3) How often did you have 6 or more drinks on one occasion in the past year? weekly    AUDIT-C Score: 7  Interpretation: Score 4-12 (male): POSITIVE screen for alcohol misuse    AUDIT Screenin) How often during the last year have you found that you were not able to stop drinking once you had started? 0 - never  5) How often during the last year have you failed to do what was normally expected from you because of drinking? 0 - never  6) How often during the last year have you needed a first drink in the morning to get yourself going after a heavy drinking session? 0 - never  7) How  "often during the last year have you had a feeling of guilt or remorse after drinking? 0 - never  8) How often during the last year have you been unable to remember what happened the night before because you had been drinking? 0 - never  9) Have you or someone else been injured as a result of your drinking? 0 - no  10) Has a relative or friend or a doctor or another health worker been concerned about your drinking or suggested you cut down? 0 - no    AUDIT Score: 7  Interpretation: Low risk alcohol consumption    Single Item Drug Screening:  How often have you used an illegal drug (including marijuana) or a prescription medication for non-medical reasons in the past year? never    Single Item Drug Screen Score: 0  Interpretation: Negative screen for possible drug use disorder    Brief Intervention  Healthy alcohol use/limits discussed.     Other Counseling Topics:   Regular weightbearing exercise.     Social Determinants of Health     Financial Resource Strain: Low Risk  (9/13/2022)    Overall Financial Resource Strain (CARDI)    • Difficulty of Paying Living Expenses: Not very hard   Food Insecurity: No Food Insecurity (7/29/2024)    Hunger Vital Sign    • Worried About Running Out of Food in the Last Year: Never true    • Ran Out of Food in the Last Year: Never true   Transportation Needs: No Transportation Needs (7/29/2024)    PRAPARE - Transportation    • Lack of Transportation (Medical): No    • Lack of Transportation (Non-Medical): No   Housing Stability: Unknown (7/29/2024)    Housing Stability Vital Sign    • Unable to Pay for Housing in the Last Year: No    • Homeless in the Last Year: No   Utilities: Not At Risk (7/29/2024)    [facility] Utilities    • Threatened with loss of utilities: No     No results found.    Objective     /70   Pulse 76   Temp 97.5 °F (36.4 °C) (Temporal)   Resp 18   Ht 5' 9\" (1.753 m)   Wt 111 kg (244 lb 9.6 oz)   SpO2 96%   BMI 36.12 kg/m²     Physical Exam  Vitals and " nursing note reviewed.   Constitutional:       General: He is not in acute distress.     Appearance: Normal appearance. He is not ill-appearing, toxic-appearing or diaphoretic.   HENT:      Head: Normocephalic and atraumatic.      Right Ear: External ear normal.      Left Ear: External ear normal.      Nose: Nose normal.      Mouth/Throat:      Mouth: Mucous membranes are moist.   Eyes:      General: No scleral icterus.     Extraocular Movements: Extraocular movements intact.      Conjunctiva/sclera: Conjunctivae normal.      Pupils: Pupils are equal, round, and reactive to light.   Cardiovascular:      Rate and Rhythm: Normal rate and regular rhythm.      Pulses: Normal pulses. no weak pulses.           Dorsalis pedis pulses are 2+ on the right side and 2+ on the left side.        Posterior tibial pulses are 2+ on the right side and 2+ on the left side.      Heart sounds: Normal heart sounds.   Pulmonary:      Effort: Pulmonary effort is normal. No respiratory distress.      Breath sounds: Normal breath sounds. No wheezing.   Abdominal:      General: Bowel sounds are normal. There is no distension.      Palpations: Abdomen is soft.      Tenderness: There is no abdominal tenderness.   Musculoskeletal:      Cervical back: Normal range of motion and neck supple.      Right lower leg: No edema.      Left lower leg: No edema.   Feet:      Right foot:      Skin integrity: No ulcer, skin breakdown, erythema, warmth, callus or dry skin.   Lymphadenopathy:      Cervical: No cervical adenopathy.   Skin:     General: Skin is warm and dry.      Coloration: Skin is not jaundiced or pale.   Neurological:      General: No focal deficit present.      Mental Status: He is alert and oriented to person, place, and time. Mental status is at baseline.   Psychiatric:         Mood and Affect: Mood normal.         Behavior: Behavior normal.         Thought Content: Thought content normal.         Judgment: Judgment normal.

## 2024-07-29 NOTE — PATIENT INSTRUCTIONS
Medicare Preventive Visit Patient Instructions  Thank you for completing your Welcome to Medicare Visit or Medicare Annual Wellness Visit today. Your next wellness visit will be due in one year (7/30/2025).  The screening/preventive services that you may require over the next 5-10 years are detailed below. Some tests may not apply to you based off risk factors and/or age. Screening tests ordered at today's visit but not completed yet may show as past due. Also, please note that scanned in results may not display below.  Preventive Screenings:  Service Recommendations Previous Testing/Comments   Colorectal Cancer Screening  Colonoscopy    Fecal Occult Blood Test (FOBT)/Fecal Immunochemical Test (FIT)  Fecal DNA/Cologuard Test  Flexible Sigmoidoscopy Age: 45-75 years old   Colonoscopy: every 10 years (May be performed more frequently if at higher risk)  OR  FOBT/FIT: every 1 year  OR  Cologuard: every 3 years  OR  Sigmoidoscopy: every 5 years  Screening may be recommended earlier than age 45 if at higher risk for colorectal cancer. Also, an individualized decision between you and your healthcare provider will decide whether screening between the ages of 76-85 would be appropriate. Colonoscopy: 08/08/2022  FOBT/FIT: Not on file  Cologuard: Not on file  Sigmoidoscopy: Not on file    Screening Current     Prostate Cancer Screening Individualized decision between patient and health care provider in men between ages of 55-69   Medicare will cover every 12 months beginning on the day after your 50th birthday PSA: 0.4 ng/mL     Screening Not Indicated     Hepatitis C Screening Once for adults born between 1945 and 1965  More frequently in patients at high risk for Hepatitis C Hep C Antibody: 06/20/2019    Screening Current   Diabetes Screening 1-2 times per year if you're at risk for diabetes or have pre-diabetes Fasting glucose: 135 mg/dL (4/29/2024)  A1C: 7.1 % (4/23/2024)  Screening Not Indicated  History Diabetes    Cholesterol Screening Once every 5 years if you don't have a lipid disorder. May order more often based on risk factors. Lipid panel: 10/17/2023  Screening Not Indicated  History Lipid Disorder      Other Preventive Screenings Covered by Medicare:  Abdominal Aortic Aneurysm (AAA) Screening: covered once if your at risk. You're considered to be at risk if you have a family history of AAA or a male between the age of 65-75 who smoking at least 100 cigarettes in your lifetime.  Lung Cancer Screening: covers low dose CT scan once per year if you meet all of the following conditions: (1) Age 55-77; (2) No signs or symptoms of lung cancer; (3) Current smoker or have quit smoking within the last 15 years; (4) You have a tobacco smoking history of at least 20 pack years (packs per day x number of years you smoked); (5) You get a written order from a healthcare provider.  Glaucoma Screening: covered annually if you're considered high risk: (1) You have diabetes OR (2) Family history of glaucoma OR (3)  aged 50 and older OR (4)  American aged 65 and older  Osteoporosis Screening: covered every 2 years if you meet one of the following conditions: (1) Have a vertebral abnormality; (2) On glucocorticoid therapy for more than 3 months; (3) Have primary hyperparathyroidism; (4) On osteoporosis medications and need to assess response to drug therapy.  HIV Screening: covered annually if you're between the age of 15-65. Also covered annually if you are younger than 15 and older than 65 with risk factors for HIV infection. For pregnant patients, it is covered up to 3 times per pregnancy.    Immunizations:  Immunization Recommendations   Influenza Vaccine Annual influenza vaccination during flu season is recommended for all persons aged >= 6 months who do not have contraindications   Pneumococcal Vaccine   * Pneumococcal conjugate vaccine = PCV13 (Prevnar 13), PCV15 (Vaxneuvance), PCV20 (Prevnar 20)  *  Pneumococcal polysaccharide vaccine = PPSV23 (Pneumovax) Adults 19-65 yo with certain risk factors or if 65+ yo  If never received any pneumonia vaccine: recommend Prevnar 20 (PCV20)  Give PCV20 if previously received 1 dose of PCV13 or PPSV23   Hepatitis B Vaccine 3 dose series if at intermediate or high risk (ex: diabetes, end stage renal disease, liver disease)   Respiratory syncytial virus (RSV) Vaccine - COVERED BY MEDICARE PART D  * RSVPreF3 (Arexvy) CDC recommends that adults 60 years of age and older may receive a single dose of RSV vaccine using shared clinical decision-making (SCDM)   Tetanus (Td) Vaccine - COST NOT COVERED BY MEDICARE PART B Following completion of primary series, a booster dose should be given every 10 years to maintain immunity against tetanus. Td may also be given as tetanus wound prophylaxis.   Tdap Vaccine - COST NOT COVERED BY MEDICARE PART B Recommended at least once for all adults. For pregnant patients, recommended with each pregnancy.   Shingles Vaccine (Shingrix) - COST NOT COVERED BY MEDICARE PART B  2 shot series recommended in those 19 years and older who have or will have weakened immune systems or those 50 years and older     Health Maintenance Due:      Topic Date Due   • Colorectal Cancer Screening  08/07/2027   • Hepatitis C Screening  Completed     Immunizations Due:      Topic Date Due   • Pneumococcal Vaccine: 65+ Years (2 of 2 - PCV) 04/18/2017   • COVID-19 Vaccine (3 - 2023-24 season) 09/01/2023   • Influenza Vaccine (1) 09/01/2024     Advance Directives   What are advance directives?  Advance directives are legal documents that state your wishes and plans for medical care. These plans are made ahead of time in case you lose your ability to make decisions for yourself. Advance directives can apply to any medical decision, such as the treatments you want, and if you want to donate organs.   What are the types of advance directives?  There are many types of advance  directives, and each state has rules about how to use them. You may choose a combination of any of the following:  Living will:  This is a written record of the treatment you want. You can also choose which treatments you do not want, which to limit, and which to stop at a certain time. This includes surgery, medicine, IV fluid, and tube feedings.   Durable power of  for healthcare (DPAHC):  This is a written record that states who you want to make healthcare choices for you when you are unable to make them for yourself. This person, called a proxy, is usually a family member or a friend. You may choose more than 1 proxy.  Do not resuscitate (DNR) order:  A DNR order is used in case your heart stops beating or you stop breathing. It is a request not to have certain forms of treatment, such as CPR. A DNR order may be included in other types of advance directives.  Medical directive:  This covers the care that you want if you are in a coma, near death, or unable to make decisions for yourself. You can list the treatments you want for each condition. Treatment may include pain medicine, surgery, blood transfusions, dialysis, IV or tube feedings, and a ventilator (breathing machine).  Values history:  This document has questions about your views, beliefs, and how you feel and think about life. This information can help others choose the care that you would choose.  Why are advance directives important?  An advance directive helps you control your care. Although spoken wishes may be used, it is better to have your wishes written down. Spoken wishes can be misunderstood, or not followed. Treatments may be given even if you do not want them. An advance directive may make it easier for your family to make difficult choices about your care.   Weight Management   Why it is important to manage your weight:  Being overweight increases your risk of health conditions such as heart disease, high blood pressure, type 2  diabetes, and certain types of cancer. It can also increase your risk for osteoarthritis, sleep apnea, and other respiratory problems. Aim for a slow, steady weight loss. Even a small amount of weight loss can lower your risk of health problems.  How to lose weight safely:  A safe and healthy way to lose weight is to eat fewer calories and get regular exercise. You can lose up about 1 pound a week by decreasing the number of calories you eat by 500 calories each day.   Healthy meal plan for weight management:  A healthy meal plan includes a variety of foods, contains fewer calories, and helps you stay healthy. A healthy meal plan includes the following:  Eat whole-grain foods more often.  A healthy meal plan should contain fiber. Fiber is the part of grains, fruits, and vegetables that is not broken down by your body. Whole-grain foods are healthy and provide extra fiber in your diet. Some examples of whole-grain foods are whole-wheat breads and pastas, oatmeal, brown rice, and bulgur.  Eat a variety of vegetables every day.  Include dark, leafy greens such as spinach, kale, catherine greens, and mustard greens. Eat yellow and orange vegetables such as carrots, sweet potatoes, and winter squash.   Eat a variety of fruits every day.  Choose fresh or canned fruit (canned in its own juice or light syrup) instead of juice. Fruit juice has very little or no fiber.  Eat low-fat dairy foods.  Drink fat-free (skim) milk or 1% milk. Eat fat-free yogurt and low-fat cottage cheese. Try low-fat cheeses such as mozzarella and other reduced-fat cheeses.  Choose meat and other protein foods that are low in fat.  Choose beans or other legumes such as split peas or lentils. Choose fish, skinless poultry (chicken or turkey), or lean cuts of red meat (beef or pork). Before you cook meat or poultry, cut off any visible fat.   Use less fat and oil.  Try baking foods instead of frying them. Add less fat, such as margarine, sour cream,  "regular salad dressing and mayonnaise to foods. Eat fewer high-fat foods. Some examples of high-fat foods include french fries, doughnuts, ice cream, and cakes.  Eat fewer sweets.  Limit foods and drinks that are high in sugar. This includes candy, cookies, regular soda, and sweetened drinks.  Exercise:  Exercise at least 30 minutes per day on most days of the week. Some examples of exercise include walking, biking, dancing, and swimming. You can also fit in more physical activity by taking the stairs instead of the elevator or parking farther away from stores. Ask your healthcare provider about the best exercise plan for you.   Alcohol Use and Your Health    Drinking too much can harm your health.  Excessive alcohol use leads to about 88,000 death in the [place] each year, and shortens the life of those who diet by almost 30 years.  Further, excessive drinking cost the economy $249 billion in 2010.  Most excessive drinkers are not alcohol dependent.    Excessive alcohol use has immediate effects that increase the risk of many harmful health conditions.  These are most often the result of binge drinking.  Over time, excessive alcohol use can lead to the development of chronic diseases and other series health problems.    What is considered a \"drink\"?        Excessive alcohol use includes:  Binge Drinking: For women, 4 or more drinks consumed on one occasion. For men, 5 or more drinks consumed on one occasion.  Heavy Drinking: For women, 8 or more drinks per week. For men, 15 or more drinks per week  Any alcohol used by pregnant women  Any alcohol used by those under the age of 21 years    If you choose to drink, do so in moderation:  Do not drink at all if you are under the age of 21, or if you are or may be pregnant, or have health problems that could be made worse by drinking.  For women, up to 1 drink per day  For men, up to 2 drinks a day    No one should begin drinking or drink more frequently based on " potential health benefits    Short-Term Health Risks:  Injuries: motor vehicle crashes, falls, drownings, burns  Violence: homicide, suicide, sexual assault, intimate partner violence  Alcohol poisoning  Reproductive health: risky sexual behaviors, unintended prengnacy, sexually transmitted diseases, miscarriage, stillbirth, fetal alcohol syndrome    Long-Term Health Risks:  Chronic diseases: high blood pressure, heart disease, stroke, liver disease, digestive problems  Cancers: breast, mouth and throat, liver, colon  Learning and memory problems: dementia, poor school performance  Mental health: depression, anxiety, insomnia  Social problems: lost productivity, family problems, unemployment  Alcohol dependence    For support and more information:  Substance Abuse and Mental Health Services Administration  [address]  [place] [address]  Web Address: http://www.samhsa.gov    Alcoholics Anonymous        Web Address: http://www.aa.org    https://www.cdc.gov/alcohol/fact-sheets/alcohol-use.htm     © Copyright [employer] 2018 Information is for End User's use only and may not be sold, redistributed or otherwise used for commercial purposes. All illustrations and images included in CareNotes® are the copyrighted property of [employer].D[employer]M., Inc. or [employer]

## 2024-08-09 DIAGNOSIS — I10 ESSENTIAL HYPERTENSION: ICD-10-CM

## 2024-08-09 DIAGNOSIS — Z95.2 S/P TAVR (TRANSCATHETER AORTIC VALVE REPLACEMENT): ICD-10-CM

## 2024-08-09 DIAGNOSIS — I35.9 NONRHEUMATIC AORTIC VALVE DISORDER: ICD-10-CM

## 2024-08-09 DIAGNOSIS — I48.0 PAF (PAROXYSMAL ATRIAL FIBRILLATION) (HCC): ICD-10-CM

## 2024-08-09 DIAGNOSIS — K21.9 GASTROESOPHAGEAL REFLUX DISEASE: ICD-10-CM

## 2024-08-09 DIAGNOSIS — M19.90 ARTHRITIS: ICD-10-CM

## 2024-08-11 RX ORDER — LOSARTAN POTASSIUM 100 MG/1
TABLET ORAL
Qty: 30 TABLET | Refills: 5 | Status: SHIPPED | OUTPATIENT
Start: 2024-08-11

## 2024-08-11 RX ORDER — ASPIRIN 81 MG
81 TABLET,CHEWABLE ORAL DAILY
Qty: 30 TABLET | Refills: 5 | Status: SHIPPED | OUTPATIENT
Start: 2024-08-11

## 2024-08-11 RX ORDER — APIXABAN 5 MG/1
TABLET, FILM COATED ORAL
Qty: 60 TABLET | Refills: 5 | Status: SHIPPED | OUTPATIENT
Start: 2024-08-11

## 2024-08-11 RX ORDER — PREDNISONE 5 MG/1
TABLET ORAL
Qty: 30 TABLET | Refills: 4 | Status: SHIPPED | OUTPATIENT
Start: 2024-08-11

## 2024-08-11 RX ORDER — FOLIC ACID 1 MG/1
TABLET ORAL
Qty: 30 TABLET | Refills: 5 | Status: SHIPPED | OUTPATIENT
Start: 2024-08-11

## 2024-08-23 ENCOUNTER — APPOINTMENT (OUTPATIENT)
Dept: LAB | Facility: HOSPITAL | Age: 79
End: 2024-08-23
Payer: MEDICARE

## 2024-08-23 DIAGNOSIS — E55.9 VITAMIN D DEFICIENCY DISEASE: ICD-10-CM

## 2024-08-23 LAB — 25(OH)D3 SERPL-MCNC: 83.6 NG/ML (ref 30–100)

## 2024-08-23 PROCEDURE — 36415 COLL VENOUS BLD VENIPUNCTURE: CPT

## 2024-08-23 PROCEDURE — 82306 VITAMIN D 25 HYDROXY: CPT

## 2024-08-28 PROBLEM — Z00.00 MEDICARE ANNUAL WELLNESS VISIT, SUBSEQUENT: Status: RESOLVED | Noted: 2020-03-03 | Resolved: 2024-08-28

## 2024-09-09 DIAGNOSIS — E78.5 DYSLIPIDEMIA: ICD-10-CM

## 2024-09-10 RX ORDER — ROSUVASTATIN CALCIUM 20 MG/1
TABLET, COATED ORAL
Qty: 30 TABLET | Refills: 5 | Status: SHIPPED | OUTPATIENT
Start: 2024-09-10

## 2024-10-03 ENCOUNTER — APPOINTMENT (OUTPATIENT)
Dept: LAB | Facility: HOSPITAL | Age: 79
End: 2024-10-03
Payer: MEDICARE

## 2024-10-03 DIAGNOSIS — E11.65 TYPE 2 DIABETES MELLITUS WITH HYPERGLYCEMIA, WITHOUT LONG-TERM CURRENT USE OF INSULIN (HCC): ICD-10-CM

## 2024-10-03 LAB
CREAT UR-MCNC: 110.8 MG/DL
MICROALBUMIN UR-MCNC: 44.4 MG/L
MICROALBUMIN/CREAT 24H UR: 40 MG/G CREATININE (ref 0–30)

## 2024-10-03 PROCEDURE — 82570 ASSAY OF URINE CREATININE: CPT

## 2024-10-03 PROCEDURE — 82043 UR ALBUMIN QUANTITATIVE: CPT

## 2024-10-04 DIAGNOSIS — I10 ESSENTIAL HYPERTENSION: ICD-10-CM

## 2024-10-04 RX ORDER — LISINOPRIL 20 MG/1
TABLET ORAL
Qty: 60 TABLET | Refills: 5 | Status: SHIPPED | OUTPATIENT
Start: 2024-10-04

## 2024-10-11 ENCOUNTER — OFFICE VISIT (OUTPATIENT)
Dept: ENDOCRINOLOGY | Facility: CLINIC | Age: 79
End: 2024-10-11
Payer: MEDICARE

## 2024-10-11 VITALS
OXYGEN SATURATION: 96 % | TEMPERATURE: 97.8 F | WEIGHT: 243 LBS | HEART RATE: 96 BPM | SYSTOLIC BLOOD PRESSURE: 128 MMHG | DIASTOLIC BLOOD PRESSURE: 68 MMHG | HEIGHT: 69 IN | BODY MASS INDEX: 35.99 KG/M2

## 2024-10-11 DIAGNOSIS — E66.01 CLASS 2 SEVERE OBESITY DUE TO EXCESS CALORIES WITH SERIOUS COMORBIDITY AND BODY MASS INDEX (BMI) OF 37.0 TO 37.9 IN ADULT (HCC): ICD-10-CM

## 2024-10-11 DIAGNOSIS — E78.2 MIXED HYPERLIPIDEMIA: ICD-10-CM

## 2024-10-11 DIAGNOSIS — I10 PRIMARY HYPERTENSION: ICD-10-CM

## 2024-10-11 DIAGNOSIS — E11.00 TYPE 2 DIABETES MELLITUS WITH HYPEROSMOLARITY WITHOUT COMA, WITHOUT LONG-TERM CURRENT USE OF INSULIN (HCC): Primary | ICD-10-CM

## 2024-10-11 DIAGNOSIS — E66.812 CLASS 2 SEVERE OBESITY DUE TO EXCESS CALORIES WITH SERIOUS COMORBIDITY AND BODY MASS INDEX (BMI) OF 37.0 TO 37.9 IN ADULT (HCC): ICD-10-CM

## 2024-10-11 PROCEDURE — G2211 COMPLEX E/M VISIT ADD ON: HCPCS | Performed by: STUDENT IN AN ORGANIZED HEALTH CARE EDUCATION/TRAINING PROGRAM

## 2024-10-11 PROCEDURE — 99214 OFFICE O/P EST MOD 30 MIN: CPT | Performed by: STUDENT IN AN ORGANIZED HEALTH CARE EDUCATION/TRAINING PROGRAM

## 2024-10-11 NOTE — ASSESSMENT & PLAN NOTE
Good control. No changes in management. May continue lifestyle strategy for mgmt of diabetes. Continue CBG testing periodically and would recommend at least q6 mo monitoring of A1c. Patient will dc back to PCP for ongoing monitoring, as is his preference. He is aware he may contact me at any time in the future should a need present itself  Lab Results   Component Value Date    HGBA1C 6.6 (H) 10/03/2024

## 2024-10-11 NOTE — PROGRESS NOTES
Ambulatory Visit  Name: Phil Hanna Sr.      : 1945      MRN: [id number]  Encounter Provider: Kit Slater DO  Encounter Date: 10/11/2024   Encounter department: [facility]    Assessment & Plan  Type 2 diabetes mellitus with hyperosmolarity without coma, without long-term current use of insulin (HCC)  Good control. No changes in management. May continue lifestyle strategy for mgmt of diabetes. Continue CBG testing periodically and would recommend at least q6 mo monitoring of A1c. Patient will dc back to PCP for ongoing monitoring, as is his preference. He is aware he may contact me at any time in the future should a need present itself  Lab Results   Component Value Date    HGBA1C 6.6 (H) 10/03/2024            Primary hypertension  Good control. There is a history of hypokalemia and patient does not take thiazide. It would be reasonable to pursue testing for hyperaldosteronism, which was previously requested.        Mixed hyperlipidemia  Continue statin       Class 2 severe obesity due to excess calories with serious comorbidity and body mass index (BMI) of 37.0 to 37.9 in adult ([facility])         RTC PRN    History of Present Illness     Phil Hanna Sr. is a 79 y.o. male who presents in follow up T2D. He is doing well. Has been pursuing mindfulness with diet. He is enjoying improvements in A1c. He has desired a non-pharmacologic approach to diabetes. His CBG testing shows fasting BG<120. No Sx. He is on chronic prednisone 5 mg daily for gout.     History obtained from : patient  Review of Systems   Constitutional:  Negative for diaphoresis and unexpected weight change.   Endocrine: Negative for polydipsia and polyuria.   All other systems reviewed and are negative.    Pertinent Medical History     Past Medical History   Past Medical History:   Diagnosis Date    Atrial fibrillation (HCC)     Colon polyp     Hyperlipidemia     Hypertension     Nonocclusive  coronary atherosclerosis of native coronary artery 03/26/2015     Past Surgical History:   Procedure Laterality Date    APPENDECTOMY      BACK SURGERY  1996    disc repair    CARDIAC CATHETERIZATION      50% lesion of mid LAD, 40% lesion of first obtuse marginal lesion. Last assessed 1/17/2018     CARDIAC CATHETERIZATION N/A 3/13/2023    Procedure: Cardiac catheterization;  Surgeon: Amanda Mars DO;  Location: [facility];  Service: Cardiology    CARDIAC CATHETERIZATION N/A 3/13/2023    Procedure: Cardiac Coronary Angiogram;  Surgeon: Amanda Mars DO;  Location: [facility];  Service: Cardiology    CARDIAC CATHETERIZATION N/A 4/18/2023    Procedure: Cardiac tavr;  Surgeon: Handy García DO;  Location: [facility];  Service: Cardiology    CHOLECYSTECTOMY      COLON SURGERY      colon ressection    COLONOSCOPY N/A 01/14/2019    Procedure: COLONOSCOPY;  Surgeon: HAFSA Armijo MD;  Location: [facility];  Service: Colorectal    COLONOSCOPY  08/08/2022    CYSTOSCOPY      Diagnostic     HEMORRHOID SURGERY  08/2011    MS REPLACE AORTIC VALVE OPENFEMORAL ARTERY APPROACH N/A 4/18/2023    Procedure: REPLACEMENT AORTIC VALVE TRANSCATHETER (TAVR) TRANSFEMORAL W/ 26MM BROWN VANESA S3 ULTRA VALVE(ACCESS ON RIGHT) JC;  Surgeon: NILDA Garner MD;  Location: [facility];  Service: Cardiac Surgery    TONSILLECTOMY       Family History   Problem Relation Age of Onset    Diabetes Mother     Stroke Mother      Current Outpatient Medications on File Prior to Visit   Medication Sig Dispense Refill    apixaban (Eliquis) 5 mg TAKE ONE TABLET BY MOUTH TWICE A DAY. 60 tablet 5    aspirin (Aspirin Low Dose) 81 mg chewable tablet Chew 1 tablet (81 mg total) daily. 30 tablet 5    Blood Glucose Monitoring Suppl (OneTouch Verio Reflect) w/Device KIT Check blood sugars once daily. Please substitute with appropriate alternative as covered by patient's insurance. Dx: E11.65 1 kit 0    ezetimibe (ZETIA)  10 mg tablet Take 1 tablet by mouth daily 30 tablet 4    folic acid (FOLVITE) 1 mg tablet TAKE (1) TABLET BY MOUTH DAILY. 30 tablet 5    furosemide (LASIX) 40 mg tablet TAKE ONE TABLET BY MOUTH TWICE A DAY. 60 tablet 11    glucose blood (OneTouch Verio) test strip Check blood sugars once daily. Please substitute with appropriate alternative as covered by patient's insurance. Dx: E11.65 100 each 3    lisinopril (ZESTRIL) 20 mg tablet TAKE (1) TABLET BY MOUTH TWICE DAILY. 60 tablet 5    losartan (COZAAR) 100 MG tablet TAKE (1) TABLET BY MOUTH DAILY. 30 tablet 5    metoprolol tartrate (LOPRESSOR) 50 mg tablet Take 1 tablet (50 mg total) by mouth every 12 (twelve) hours 60 tablet 11    NIFEdipine (PROCARDIA XL) 90 mg 24 hr tablet TAKE 1 TABLET DAILY. 30 tablet 11    omeprazole (PriLOSEC) 20 mg delayed release capsule TAKE (1) CAPSULE DAILY. 30 capsule 5    OneTouch Delica Lancets 33G MISC Check blood sugars once daily. Please substitute with appropriate alternative as covered by patient's insurance. Dx: E11.65 100 each 3    potassium chloride (Klor-Con M20) 20 mEq tablet Take 1 tablet (20 mEq total) by mouth 2 (two) times a day 30 tablet 0    predniSONE 5 mg tablet TAKE ONE TABLET DAILY. 30 tablet 4    rosuvastatin (CRESTOR) 20 MG tablet TAKE ONE TABLET BY MOUTH DAILY. 30 tablet 5     No current facility-administered medications on file prior to visit.   No Known Allergies   Current Outpatient Medications on File Prior to Visit   Medication Sig Dispense Refill    apixaban (Eliquis) 5 mg TAKE ONE TABLET BY MOUTH TWICE A DAY. 60 tablet 5    aspirin (Aspirin Low Dose) 81 mg chewable tablet Chew 1 tablet (81 mg total) daily. 30 tablet 5    Blood Glucose Monitoring Suppl (OneTouch Verio Reflect) w/Device KIT Check blood sugars once daily. Please substitute with appropriate alternative as covered by patient's insurance. Dx: E11.65 1 kit 0    ezetimibe (ZETIA) 10 mg tablet Take 1 tablet by mouth daily 30 tablet 4    folic acid  "(FOLVITE) 1 mg tablet TAKE (1) TABLET BY MOUTH DAILY. 30 tablet 5    furosemide (LASIX) 40 mg tablet TAKE ONE TABLET BY MOUTH TWICE A DAY. 60 tablet 11    glucose blood (OneTouch Verio) test strip Check blood sugars once daily. Please substitute with appropriate alternative as covered by patient's insurance. Dx: E11.65 100 each 3    lisinopril (ZESTRIL) 20 mg tablet TAKE (1) TABLET BY MOUTH TWICE DAILY. 60 tablet 5    losartan (COZAAR) 100 MG tablet TAKE (1) TABLET BY MOUTH DAILY. 30 tablet 5    metoprolol tartrate (LOPRESSOR) 50 mg tablet Take 1 tablet (50 mg total) by mouth every 12 (twelve) hours 60 tablet 11    NIFEdipine (PROCARDIA XL) 90 mg 24 hr tablet TAKE 1 TABLET DAILY. 30 tablet 11    omeprazole (PriLOSEC) 20 mg delayed release capsule TAKE (1) CAPSULE DAILY. 30 capsule 5    OneTouch Delica Lancets 33G MISC Check blood sugars once daily. Please substitute with appropriate alternative as covered by patient's insurance. Dx: E11.65 100 each 3    potassium chloride (Klor-Con M20) 20 mEq tablet Take 1 tablet (20 mEq total) by mouth 2 (two) times a day 30 tablet 0    predniSONE 5 mg tablet TAKE ONE TABLET DAILY. 30 tablet 4    rosuvastatin (CRESTOR) 20 MG tablet TAKE ONE TABLET BY MOUTH DAILY. 30 tablet 5     No current facility-administered medications on file prior to visit.      Social History     Tobacco Use    Smoking status: Former    Smokeless tobacco: Former   Vaping Use    Vaping status: Never Used   Substance and Sexual Activity    Alcohol use: Yes     Alcohol/week: 4.0 standard drinks of alcohol     Types: 4 Cans of beer per week     Comment: daily    Drug use: No    Sexual activity: Not on file         Objective     /68 (BP Location: Left arm, Patient Position: Sitting, Cuff Size: Large)   Pulse 96   Temp 97.8 °F (36.6 °C)   Ht 5' 9\" (1.753 m)   Wt 110 kg (243 lb)   SpO2 96%   BMI 35.88 kg/m²     Physical Exam  Vitals reviewed.   Constitutional:       General: He is not in acute " distress.     Appearance: Normal appearance.   HENT:      Head: Normocephalic and atraumatic.      Nose: Nose normal.   Eyes:      General: No scleral icterus.     Conjunctiva/sclera: Conjunctivae normal.   Pulmonary:      Effort: Pulmonary effort is normal. No respiratory distress.   Musculoskeletal:         General: No deformity.      Cervical back: Normal range of motion.   Skin:     General: Skin is warm and dry.   Neurological:      Mental Status: He is alert.   Psychiatric:         Mood and Affect: Mood normal.         Behavior: Behavior normal.       Lab Results   Component Value Date    SODIUM 139 10/03/2024    K 3.1 (L) 10/03/2024    CL 98 10/03/2024    CO2 27 10/03/2024    BUN 12 10/03/2024    CREATININE 0.84 10/03/2024    GLUC 136 04/19/2023    CALCIUM 8.9 10/03/2024     Lab Results   Component Value Date    HGBA1C 6.6 (H) 10/03/2024

## 2024-10-11 NOTE — ASSESSMENT & PLAN NOTE
Good control. There is a history of hypokalemia and patient does not take thiazide. It would be reasonable to pursue testing for hyperaldosteronism, which was previously requested.

## 2024-10-21 ENCOUNTER — OFFICE VISIT (OUTPATIENT)
Dept: CARDIOLOGY CLINIC | Facility: HOSPITAL | Age: 79
End: 2024-10-21
Payer: MEDICARE

## 2024-10-21 VITALS
BODY MASS INDEX: 35.66 KG/M2 | HEIGHT: 69 IN | WEIGHT: 240.8 LBS | OXYGEN SATURATION: 92 % | SYSTOLIC BLOOD PRESSURE: 128 MMHG | HEART RATE: 80 BPM | DIASTOLIC BLOOD PRESSURE: 78 MMHG

## 2024-10-21 DIAGNOSIS — E78.5 DYSLIPIDEMIA: ICD-10-CM

## 2024-10-21 DIAGNOSIS — I11.0 HYPERTENSIVE HEART DISEASE WITH CHRONIC DIASTOLIC CONGESTIVE HEART FAILURE (HCC): ICD-10-CM

## 2024-10-21 DIAGNOSIS — I47.19 PAROXYSMAL ATRIAL TACHYCARDIA (HCC): ICD-10-CM

## 2024-10-21 DIAGNOSIS — G47.33 OBSTRUCTIVE SLEEP APNEA OF ADULT: ICD-10-CM

## 2024-10-21 DIAGNOSIS — I35.0 SEVERE AORTIC STENOSIS: Primary | ICD-10-CM

## 2024-10-21 DIAGNOSIS — I25.10 NONOCCLUSIVE CORONARY ATHEROSCLEROSIS OF NATIVE CORONARY ARTERY: ICD-10-CM

## 2024-10-21 DIAGNOSIS — I48.11 LONGSTANDING PERSISTENT ATRIAL FIBRILLATION (HCC): ICD-10-CM

## 2024-10-21 DIAGNOSIS — I50.32 HYPERTENSIVE HEART DISEASE WITH CHRONIC DIASTOLIC CONGESTIVE HEART FAILURE (HCC): ICD-10-CM

## 2024-10-21 DIAGNOSIS — Z95.2 S/P TAVR (TRANSCATHETER AORTIC VALVE REPLACEMENT): ICD-10-CM

## 2024-10-21 DIAGNOSIS — I50.32 CHRONIC DIASTOLIC CHF (CONGESTIVE HEART FAILURE) (HCC): ICD-10-CM

## 2024-10-21 PROCEDURE — 99214 OFFICE O/P EST MOD 30 MIN: CPT | Performed by: INTERNAL MEDICINE

## 2024-10-21 NOTE — PROGRESS NOTES
Cardiology Follow Up    Phil Hanna Sr.  1945  [id number]  [facility] [address] [address]    1. Severe aortic stenosis        2. S/P TAVR (transcatheter aortic valve replacement)        3. Chronic diastolic CHF (congestive heart failure) (HCC)        4. Hypertensive heart disease with chronic diastolic congestive heart failure (HCC)        5. Longstanding persistent atrial fibrillation (HCC)        6. Paroxysmal atrial tachycardia (HCC)        7. Nonocclusive coronary atherosclerosis of native coronary artery        8. Obstructive sleep apnea of adult        9. Dyslipidemia            Discussion/Summary:  Mr. Hanna is a pleasant 79-year-old male who presents to the office today for routine follow-up. Since his last visit he feels well.     He appears euvolemic on exam today.  No changes were made to his diuretic regimen.  He should continue to weigh himself on a regular basis and call the office with any signs or symptoms of volume overload.  A low-salt diet was reinforced.    His blood pressure is well controlled in the office today.  No changes were made to his antihypertensive medication regimen.    His LDL from earlier this year is at goal on his current statin regimen to which no changes were advised.     Regarding his atrial fibrillation, a rate-control strategy is being pursued.  No changes were made to his AV edward blocking regimen.  He is on systemic anticoagulation with Eliquis.  He has a known history of obstructive sleep apnea and is noncompliant with CPAP.    He recently had a follow-up visit with our cardiac surgical team with an echo done prior revealing a normally functioning TAVR.  He was reminded of the need for antibiotics prior to the dentist.    I will see him back in the office in six months or sooner if deemed necessary.    Interval History:     Cyndi is a pleasant 79-year-old male who presents to the office today for routine follow-up.     He does not participate in any formal physical activity but is active around his home and in his yard.  With the activity he is able to perform he is asymptomatic.  He denies any exertional chest pain or shortness of breath.  He denies any symptoms from his atrial fibrillation.  He is maintained on systemic anticoagulation with Eliquis without bleeding consequences or falls.  He denies any signs or symptoms of congestive heart failure including progressive lower extremity edema, paroxysmal nocturnal dyspnea, orthopnea, acute weight gain or increasing abdominal girth.  He weighs about 240 pounds on his home scale.  He attempts to abide by a low-salt diet.  He denies lightheadedness, syncope or presyncope.  He denies symptoms of claudication.    He remains non-compliant with CPAP.    Problem List       Aortic stenosis, mild    Chronic diastolic CHF (congestive heart failure) (HCC)    Coronary artery disease    Dyslipidemia    Hypertension    Obstructive sleep apnea of adult    Paroxysmal atrial fibrillation (HCC)    Paroxysmal atrial tachycardia (HCC)          Past Medical History:   Diagnosis Date    Atrial fibrillation (HCC)     Colon polyp     Hyperlipidemia     Hypertension     Nonocclusive coronary atherosclerosis of native coronary artery 03/26/2015     Social History     Socioeconomic History    Marital status: /Civil Union     Spouse name: Not on file    Number of children: Not on file    Years of education: Not on file    Highest education level: Not on file   Occupational History    Not on file   Tobacco Use    Smoking status: Former    Smokeless tobacco: Former   Vaping Use    Vaping status: Never Used   Substance and Sexual Activity    Alcohol use: Yes     Alcohol/week: 4.0 standard drinks of alcohol     Types: 4 Cans of beer per week     Comment: daily    Drug use: No    Sexual activity: Not on file    Other Topics Concern    Not on file   Social History Narrative    Dental care, regularly    Good dental hygiene    No advance directives    No caffeine use    Uses safety equipment - Seatbelts      Social Determinants of Health     Financial Resource Strain: Low Risk  (9/13/2022)    Overall Financial Resource Strain (CARDIA)     Difficulty of Paying Living Expenses: Not very hard   Food Insecurity: No Food Insecurity (7/29/2024)    Hunger Vital Sign     Worried About Running Out of Food in the Last Year: Never true     Ran Out of Food in the Last Year: Never true   Transportation Needs: No Transportation Needs (7/29/2024)    PRAPARE - Transportation     Lack of Transportation (Medical): No     Lack of Transportation (Non-Medical): No   Physical Activity: Not on file   Stress: Not on file   Social Connections: Not on file   Intimate Partner Violence: Not on file   Housing Stability: Low Risk  (7/29/2024)    Housing Stability Vital Sign     Unable to Pay for Housing in the Last Year: No     Number of Times Moved in the Last Year: 1     Homeless in the Last Year: No      Family History   Problem Relation Age of Onset    Diabetes Mother     Stroke Mother      Past Surgical History:   Procedure Laterality Date    APPENDECTOMY      BACK SURGERY  1996    disc repair    CARDIAC CATHETERIZATION      50% lesion of mid LAD, 40% lesion of first obtuse marginal lesion. Last assessed 1/17/2018     CARDIAC CATHETERIZATION N/A 3/13/2023    Procedure: Cardiac catheterization;  Surgeon: Amanda Mars DO;  Location: [facility];  Service: Cardiology    CARDIAC CATHETERIZATION N/A 3/13/2023    Procedure: Cardiac Coronary Angiogram;  Surgeon: Amanda Mars DO;  Location: [facility];  Service: Cardiology    CARDIAC CATHETERIZATION N/A 4/18/2023    Procedure: Cardiac tavr;  Surgeon: Handy García DO;  Location: [facility];  Service: Cardiology    CHOLECYSTECTOMY      COLON SURGERY      colon ressection     COLONOSCOPY N/A 01/14/2019    Procedure: COLONOSCOPY;  Surgeon: HAFSA Armijo MD;  Location: [facility];  Service: Colorectal    COLONOSCOPY  08/08/2022    CYSTOSCOPY      Diagnostic     HEMORRHOID SURGERY  08/2011    IA REPLACE AORTIC VALVE OPENFEMORAL ARTERY APPROACH N/A 4/18/2023    Procedure: REPLACEMENT AORTIC VALVE TRANSCATHETER (TAVR) TRANSFEMORAL W/ 26MM BROWN VANESA S3 ULTRA VALVE(ACCESS ON RIGHT) JC;  Surgeon: NILDA Garner MD;  Location:  MAIN OR;  Service: Cardiac Surgery    TONSILLECTOMY         Current Outpatient Medications:     apixaban (Eliquis) 5 mg, TAKE ONE TABLET BY MOUTH TWICE A DAY., Disp: 60 tablet, Rfl: 5    aspirin (Aspirin Low Dose) 81 mg chewable tablet, Chew 1 tablet (81 mg total) daily., Disp: 30 tablet, Rfl: 5    Blood Glucose Monitoring Suppl (OneTouch Verio Reflect) w/Device KIT, Check blood sugars once daily. Please substitute with appropriate alternative as covered by patient's insurance. Dx: E11.65, Disp: 1 kit, Rfl: 0    ezetimibe (ZETIA) 10 mg tablet, Take 1 tablet by mouth daily, Disp: 30 tablet, Rfl: 4    folic acid (FOLVITE) 1 mg tablet, TAKE (1) TABLET BY MOUTH DAILY., Disp: 30 tablet, Rfl: 5    furosemide (LASIX) 40 mg tablet, TAKE ONE TABLET BY MOUTH TWICE A DAY., Disp: 60 tablet, Rfl: 11    glucose blood (OneTouch Verio) test strip, Check blood sugars once daily. Please substitute with appropriate alternative as covered by patient's insurance. Dx: E11.65, Disp: 100 each, Rfl: 3    lisinopril (ZESTRIL) 20 mg tablet, TAKE (1) TABLET BY MOUTH TWICE DAILY., Disp: 60 tablet, Rfl: 5    losartan (COZAAR) 100 MG tablet, TAKE (1) TABLET BY MOUTH DAILY., Disp: 30 tablet, Rfl: 5    metoprolol tartrate (LOPRESSOR) 50 mg tablet, Take 1 tablet (50 mg total) by mouth every 12 (twelve) hours, Disp: 60 tablet, Rfl: 11    NIFEdipine (PROCARDIA XL) 90 mg 24 hr tablet, TAKE 1 TABLET DAILY., Disp: 30 tablet, Rfl: 11    omeprazole (PriLOSEC) 20 mg delayed release capsule,  "TAKE (1) CAPSULE DAILY., Disp: 30 capsule, Rfl: 5    OneTouch Delica Lancets 33G MISC, Check blood sugars once daily. Please substitute with appropriate alternative as covered by patient's insurance. Dx: E11.65, Disp: 100 each, Rfl: 3    potassium chloride (Klor-Con M20) 20 mEq tablet, Take 1 tablet (20 mEq total) by mouth 2 (two) times a day, Disp: 30 tablet, Rfl: 0    predniSONE 5 mg tablet, TAKE ONE TABLET DAILY., Disp: 30 tablet, Rfl: 4    rosuvastatin (CRESTOR) 20 MG tablet, TAKE ONE TABLET BY MOUTH DAILY., Disp: 30 tablet, Rfl: 5  No Known Allergies    Labs:     Chemistry        Component Value Date/Time     12/31/2015 0705    K 3.1 (L) 10/03/2024 0923    K 4.3 12/31/2015 0705    CL 98 10/03/2024 0923     12/31/2015 0705    CO2 27 10/03/2024 0923    CO2 25 04/18/2023 1237    BUN 12 10/03/2024 0923    BUN 14 12/31/2015 0705    CREATININE 0.84 10/03/2024 0923    CREATININE 0.83 12/31/2015 0705        Component Value Date/Time    CALCIUM 8.9 10/03/2024 0923    CALCIUM 8.6 12/31/2015 0705    ALKPHOS 49 04/23/2024 1340    ALKPHOS 51 12/31/2015 0705    AST 44 (H) 04/23/2024 1340    AST 29 12/31/2015 0705    ALT 38 04/23/2024 1340    ALT 48 12/31/2015 0705    BILITOT 1.07 (H) 12/31/2015 0705            Lab Results   Component Value Date    CHOL 213 12/31/2015    CHOL 226 03/12/2015     Lab Results   Component Value Date    HDL 84 10/17/2023    HDL 98 05/12/2022    HDL 82 07/01/2020     Lab Results   Component Value Date    LDLCALC 53 10/17/2023    LDLCALC 39 05/12/2022    LDLCALC 54 07/01/2020     Lab Results   Component Value Date    TRIG 73 10/17/2023    TRIG 59 05/12/2022    TRIG 102 07/01/2020     No results found for: \"CHOLHDL\"    Imaging: No results found.      Review of Systems   Cardiovascular:  Negative for chest pain, claudication, leg swelling, near-syncope, orthopnea and palpitations.   All other systems reviewed and are negative.      Vitals:    10/21/24 1235   BP: 128/78   Pulse:    SpO2:  " "        Vitals:    10/21/24 1221   Weight: 109 kg (240 lb 12.8 oz)         Height: 5' 9\" (175.3 cm)   Body mass index is 35.56 kg/m².    Physical Exam:  General:  Alert and cooperative, appears stated age  HEENT:  PERRLA, EOMI, no scleral icterus, no conjunctival pallor  Neck:  No lymphadenopathy, no thyromegaly, no carotid bruits, no elevated JVP  Heart: Irregularly irregular, variable S1/S2, no murmur  Lungs:  Clear to auscultation bilaterally   Abdomen:  Soft, non-tender, positive bowel sounds, no rebound or guarding,   no organomegaly   Extremities:  No clubbing, cyanosis or edema   Vascular:  2+ pedal pulses  Skin:  No rashes or lesions on exposed skin  Neurologic:  Cranial nerves II-XII grossly intact without focal deficits   "

## 2024-10-22 ENCOUNTER — TELEPHONE (OUTPATIENT)
Age: 79
End: 2024-10-22

## 2024-10-22 NOTE — TELEPHONE ENCOUNTER
Spoke with patient's wife, Nancie, regarding assistance for Eliquis coverage. States he was given a website to go on but he forgets what it is.    Advised her to fill out the 2024 low income Medicare subsidy form, can be found at ssa.gov/pubs/en-05-54463.pdf. once the refusal letter is received then go to [employer] site.    Advised her to look up [employer] patient assistance foundation, fill out the form for them and provide the necessary information for it. Bring it into the office and they can fax it.

## 2024-11-06 DIAGNOSIS — I48.19 PERSISTENT ATRIAL FIBRILLATION (HCC): ICD-10-CM

## 2024-11-06 DIAGNOSIS — I10 ESSENTIAL HYPERTENSION: ICD-10-CM

## 2024-11-06 DIAGNOSIS — I50.32 CHF (CONGESTIVE HEART FAILURE), NYHA CLASS II, CHRONIC, DIASTOLIC (HCC): ICD-10-CM

## 2024-11-06 RX ORDER — METOPROLOL TARTRATE 50 MG
50 TABLET ORAL EVERY 12 HOURS SCHEDULED
Qty: 60 TABLET | Refills: 5 | Status: SHIPPED | OUTPATIENT
Start: 2024-11-06

## 2024-11-06 RX ORDER — FUROSEMIDE 40 MG/1
TABLET ORAL
Qty: 60 TABLET | Refills: 5 | Status: SHIPPED | OUTPATIENT
Start: 2024-11-06

## 2024-11-06 RX ORDER — NIFEDIPINE 90 MG/1
TABLET, EXTENDED RELEASE ORAL
Qty: 30 TABLET | Refills: 5 | Status: SHIPPED | OUTPATIENT
Start: 2024-11-06

## 2024-12-06 DIAGNOSIS — I10 ESSENTIAL HYPERTENSION: ICD-10-CM

## 2024-12-06 RX ORDER — LOSARTAN POTASSIUM 100 MG/1
100 TABLET ORAL DAILY
Qty: 30 TABLET | Refills: 4 | Status: SHIPPED | OUTPATIENT
Start: 2024-12-06

## 2025-01-08 DIAGNOSIS — I35.9 NONRHEUMATIC AORTIC VALVE DISORDER: ICD-10-CM

## 2025-01-08 DIAGNOSIS — Z95.2 S/P TAVR (TRANSCATHETER AORTIC VALVE REPLACEMENT): ICD-10-CM

## 2025-01-08 DIAGNOSIS — K21.9 GASTROESOPHAGEAL REFLUX DISEASE: ICD-10-CM

## 2025-01-08 DIAGNOSIS — M19.90 ARTHRITIS: ICD-10-CM

## 2025-01-10 RX ORDER — FOLIC ACID 1 MG/1
1000 TABLET ORAL DAILY
Qty: 30 TABLET | Refills: 5 | Status: SHIPPED | OUTPATIENT
Start: 2025-01-10

## 2025-01-10 RX ORDER — PREDNISONE 5 MG/1
5 TABLET ORAL DAILY
Qty: 30 TABLET | Refills: 4 | Status: SHIPPED | OUTPATIENT
Start: 2025-01-10

## 2025-01-10 RX ORDER — ASPIRIN 81 MG
81 TABLET,CHEWABLE ORAL DAILY
Qty: 30 TABLET | Refills: 5 | Status: SHIPPED | OUTPATIENT
Start: 2025-01-10

## 2025-01-14 ENCOUNTER — RA CDI HCC (OUTPATIENT)
Dept: OTHER | Facility: HOSPITAL | Age: 80
End: 2025-01-14

## 2025-01-21 ENCOUNTER — OFFICE VISIT (OUTPATIENT)
Dept: FAMILY MEDICINE CLINIC | Facility: CLINIC | Age: 80
End: 2025-01-21
Payer: MEDICARE

## 2025-01-21 VITALS
TEMPERATURE: 97.2 F | WEIGHT: 247.8 LBS | SYSTOLIC BLOOD PRESSURE: 130 MMHG | BODY MASS INDEX: 36.7 KG/M2 | HEART RATE: 92 BPM | HEIGHT: 69 IN | RESPIRATION RATE: 18 BRPM | DIASTOLIC BLOOD PRESSURE: 72 MMHG | OXYGEN SATURATION: 97 %

## 2025-01-21 DIAGNOSIS — I48.11 LONGSTANDING PERSISTENT ATRIAL FIBRILLATION (HCC): ICD-10-CM

## 2025-01-21 DIAGNOSIS — I50.32 CHRONIC DIASTOLIC CHF (CONGESTIVE HEART FAILURE) (HCC): ICD-10-CM

## 2025-01-21 DIAGNOSIS — E11.65 TYPE 2 DIABETES MELLITUS WITH HYPERGLYCEMIA, WITHOUT LONG-TERM CURRENT USE OF INSULIN (HCC): Primary | ICD-10-CM

## 2025-01-21 DIAGNOSIS — M06.9 RHEUMATOID ARTHRITIS, INVOLVING UNSPECIFIED SITE, UNSPECIFIED WHETHER RHEUMATOID FACTOR PRESENT (HCC): ICD-10-CM

## 2025-01-21 DIAGNOSIS — I47.19 PAROXYSMAL ATRIAL TACHYCARDIA (HCC): ICD-10-CM

## 2025-01-21 PROBLEM — J30.1 ALLERGIC RHINITIS DUE TO POLLEN: Status: RESOLVED | Noted: 2017-09-26 | Resolved: 2025-01-21

## 2025-01-21 PROCEDURE — G2211 COMPLEX E/M VISIT ADD ON: HCPCS | Performed by: INTERNAL MEDICINE

## 2025-01-21 PROCEDURE — 99214 OFFICE O/P EST MOD 30 MIN: CPT | Performed by: INTERNAL MEDICINE

## 2025-01-21 NOTE — ASSESSMENT & PLAN NOTE
Wt Readings from Last 3 Encounters:   01/21/25 112 kg (247 lb 12.8 oz)   10/21/24 109 kg (240 lb 12.8 oz)   10/11/24 110 kg (243 lb)     Lo sodium diet Stay hydrated

## 2025-01-21 NOTE — ASSESSMENT & PLAN NOTE
Lab Results   Component Value Date    HGBA1C 6.6 (H) 10/03/2024   Pt will recheck labs in spring Lo carb diet Continue regular exercise/activity    Orders:    Comprehensive metabolic panel; Future    Lipid panel; Future    Hemoglobin A1C; Future

## 2025-01-21 NOTE — PROGRESS NOTES
Name: Phil Hanna .      : 1945      MRN: [id number]  Encounter Provider: Nely Valencia DO  Encounter Date: 2025   Encounter department: [place]  :  Assessment & Plan  Type 2 diabetes mellitus with hyperglycemia, without long-term current use of insulin (HCC)    Lab Results   Component Value Date    HGBA1C 6.6 (H) 10/03/2024   Pt will recheck labs in spring Lo carb diet Continue regular exercise/activity    Orders:    Comprehensive metabolic panel; Future    Lipid panel; Future    Hemoglobin A1C; Future    Rheumatoid arthritis, involving unspecified site, unspecified whether rheumatoid factor present (HCC)  Sxs stable He remains active and denies limiting sxs        Chronic diastolic CHF (congestive heart failure) (HCC)  Wt Readings from Last 3 Encounters:   25 112 kg (247 lb 12.8 oz)   10/21/24 109 kg (240 lb 12.8 oz)   10/11/24 110 kg (243 lb)     Lo sodium diet Stay hydrated          Longstanding persistent atrial fibrillation (HCC)  Stable and follows with Cardiology       Paroxysmal atrial tachycardia (HCC)  No sxs noted Stable on current rx and has Cardio appt in           Rto 6months/awv    Depression Screening and Follow-up Plan: Patient was screened for depression during today's encounter. They screened negative with a PHQ-2 score of 0.      History of Present Illness   HPI  Pt doing ok overall No acute issues No chest pain or sob He is active outside and around the house with projects No falls No limiting joint sxs He is trying to stay hydrated No dizziness or palpiations He saw Cardio in October/ changes made  Review of Systems   Constitutional:  Negative for activity change, chills and fever.   HENT: Negative.     Eyes:  Negative for visual disturbance.   Respiratory:  Negative for cough and shortness of breath.    Cardiovascular: Negative.    Gastrointestinal: Negative.    Genitourinary: Negative.    Musculoskeletal:  Positive for arthralgias.  "  Neurological:  Negative for dizziness, light-headedness and headaches.   Psychiatric/Behavioral:  Negative for sleep disturbance. The patient is not nervous/anxious.        Objective   /72   Pulse 92   Temp (!) 97.2 °F (36.2 °C) (Temporal)   Resp 18   Ht 5' 9\" (1.753 m)   Wt 112 kg (247 lb 12.8 oz)   SpO2 97%   BMI 36.59 kg/m²      Physical Exam  Vitals and nursing note reviewed.   Constitutional:       General: He is not in acute distress.     Appearance: Normal appearance. He is not ill-appearing, toxic-appearing or diaphoretic.   HENT:      Head: Normocephalic and atraumatic.      Right Ear: External ear normal.      Left Ear: External ear normal.      Nose: Nose normal.      Mouth/Throat:      Mouth: Mucous membranes are moist.   Eyes:      Extraocular Movements: Extraocular movements intact.      Pupils: Pupils are equal, round, and reactive to light.   Cardiovascular:      Rate and Rhythm: Normal rate and regular rhythm.      Pulses: Normal pulses.      Heart sounds: Normal heart sounds.   Pulmonary:      Effort: Pulmonary effort is normal. No respiratory distress.      Breath sounds: Normal breath sounds. No wheezing.   Abdominal:      General: Bowel sounds are normal. There is no distension.      Palpations: Abdomen is soft.      Tenderness: There is no abdominal tenderness.   Musculoskeletal:      Cervical back: Normal range of motion and neck supple.      Right lower leg: No edema.      Left lower leg: No edema.   Lymphadenopathy:      Cervical: No cervical adenopathy.   Skin:     General: Skin is warm and dry.      Coloration: Skin is not jaundiced or pale.   Neurological:      General: No focal deficit present.      Mental Status: He is alert and oriented to person, place, and time. Mental status is at baseline.      Cranial Nerves: No cranial nerve deficit.   Psychiatric:         Mood and Affect: Mood normal.         Behavior: Behavior normal.         Thought Content: Thought content " normal.         Judgment: Judgment normal.

## 2025-02-07 DIAGNOSIS — E78.5 DYSLIPIDEMIA: ICD-10-CM

## 2025-02-10 RX ORDER — ROSUVASTATIN CALCIUM 20 MG/1
20 TABLET, COATED ORAL DAILY
Qty: 30 TABLET | Refills: 0 | Status: SHIPPED | OUTPATIENT
Start: 2025-02-10

## 2025-03-06 DIAGNOSIS — I10 ESSENTIAL HYPERTENSION: ICD-10-CM

## 2025-03-06 RX ORDER — LISINOPRIL 20 MG/1
20 TABLET ORAL 2 TIMES DAILY
Qty: 60 TABLET | Refills: 4 | Status: SHIPPED | OUTPATIENT
Start: 2025-03-06

## 2025-05-06 DIAGNOSIS — I10 ESSENTIAL HYPERTENSION: ICD-10-CM

## 2025-05-06 DIAGNOSIS — I48.19 PERSISTENT ATRIAL FIBRILLATION (HCC): ICD-10-CM

## 2025-05-06 DIAGNOSIS — I50.32 CHF (CONGESTIVE HEART FAILURE), NYHA CLASS II, CHRONIC, DIASTOLIC (HCC): ICD-10-CM

## 2025-05-06 DIAGNOSIS — E78.5 DYSLIPIDEMIA: ICD-10-CM

## 2025-05-07 RX ORDER — NIFEDIPINE 90 MG/1
90 TABLET, EXTENDED RELEASE ORAL DAILY
Qty: 30 TABLET | Refills: 4 | Status: SHIPPED | OUTPATIENT
Start: 2025-05-07

## 2025-05-07 RX ORDER — METOPROLOL TARTRATE 50 MG
50 TABLET ORAL 2 TIMES DAILY
Qty: 60 TABLET | Refills: 4 | Status: SHIPPED | OUTPATIENT
Start: 2025-05-07

## 2025-05-07 RX ORDER — FUROSEMIDE 40 MG/1
40 TABLET ORAL 2 TIMES DAILY
Qty: 60 TABLET | Refills: 4 | Status: SHIPPED | OUTPATIENT
Start: 2025-05-07

## 2025-05-08 DIAGNOSIS — E78.5 DYSLIPIDEMIA: ICD-10-CM

## 2025-05-08 RX ORDER — ROSUVASTATIN CALCIUM 20 MG/1
20 TABLET, COATED ORAL DAILY
Qty: 30 TABLET | Refills: 4 | OUTPATIENT
Start: 2025-05-08

## 2025-05-08 RX ORDER — LOSARTAN POTASSIUM 100 MG/1
100 TABLET ORAL DAILY
Qty: 30 TABLET | Refills: 4 | Status: SHIPPED | OUTPATIENT
Start: 2025-05-08

## 2025-05-08 RX ORDER — ROSUVASTATIN CALCIUM 20 MG/1
20 TABLET, COATED ORAL DAILY
Qty: 30 TABLET | Refills: 4 | Status: SHIPPED | OUTPATIENT
Start: 2025-05-08

## 2025-05-08 NOTE — TELEPHONE ENCOUNTER
Spouse called patient's PCP office to follow up on medication refill for Rosuvastatin. Spouse stated he is almost out of medication.     Pharmacy stated they have not yet received the prescription.     Please advise  Thank you

## 2025-05-12 ENCOUNTER — TELEPHONE (OUTPATIENT)
Age: 80
End: 2025-05-12

## 2025-05-12 DIAGNOSIS — J01.00 ACUTE MAXILLARY SINUSITIS, RECURRENCE NOT SPECIFIED: Primary | ICD-10-CM

## 2025-05-12 RX ORDER — AZITHROMYCIN 250 MG/1
TABLET, FILM COATED ORAL
Qty: 6 TABLET | Refills: 0 | Status: SHIPPED | OUTPATIENT
Start: 2025-05-12 | End: 2025-05-17

## 2025-05-12 NOTE — TELEPHONE ENCOUNTER
Nancie, wife of pt, called in reporting pt has been dealing with a sinus infection for some time now and is requesting if Dr. Valencia could please send something in?    Script going to the following pharmacy:  [facility] [facility] - [address] [facility] [phone]     Offered to schedule an appt, but wife stated pt wouldn't want to do that.     Please advise & call Nancie back with an update. Thank you!    Nancie: [phone]

## 2025-05-23 ENCOUNTER — OFFICE VISIT (OUTPATIENT)
Dept: FAMILY MEDICINE CLINIC | Facility: CLINIC | Age: 80
End: 2025-05-23

## 2025-05-23 ENCOUNTER — NURSE TRIAGE (OUTPATIENT)
Age: 80
End: 2025-05-23

## 2025-05-23 ENCOUNTER — APPOINTMENT (OUTPATIENT)
Dept: LAB | Facility: MEDICAL CENTER | Age: 80
End: 2025-05-23
Attending: INTERNAL MEDICINE
Payer: MEDICARE

## 2025-05-23 ENCOUNTER — RESULTS FOLLOW-UP (OUTPATIENT)
Dept: FAMILY MEDICINE CLINIC | Facility: CLINIC | Age: 80
End: 2025-05-23

## 2025-05-23 VITALS
BODY MASS INDEX: 37.33 KG/M2 | DIASTOLIC BLOOD PRESSURE: 78 MMHG | WEIGHT: 252 LBS | HEART RATE: 94 BPM | HEIGHT: 69 IN | TEMPERATURE: 97.3 F | RESPIRATION RATE: 18 BRPM | OXYGEN SATURATION: 96 % | SYSTOLIC BLOOD PRESSURE: 124 MMHG

## 2025-05-23 DIAGNOSIS — R31.0 GROSS HEMATURIA: Primary | ICD-10-CM

## 2025-05-23 DIAGNOSIS — I10 PRIMARY HYPERTENSION: ICD-10-CM

## 2025-05-23 DIAGNOSIS — E11.65 TYPE 2 DIABETES MELLITUS WITH HYPERGLYCEMIA, WITHOUT LONG-TERM CURRENT USE OF INSULIN (HCC): ICD-10-CM

## 2025-05-23 DIAGNOSIS — R31.0 GROSS HEMATURIA: ICD-10-CM

## 2025-05-23 LAB
ALBUMIN SERPL BCG-MCNC: 4 G/DL (ref 3.5–5)
ALP SERPL-CCNC: 51 U/L (ref 34–104)
ALT SERPL W P-5'-P-CCNC: 33 U/L (ref 7–52)
ANION GAP SERPL CALCULATED.3IONS-SCNC: 12 MMOL/L (ref 4–13)
AST SERPL W P-5'-P-CCNC: 36 U/L (ref 13–39)
BACTERIA UR QL AUTO: ABNORMAL /HPF
BILIRUB SERPL-MCNC: 1.37 MG/DL (ref 0.2–1)
BILIRUB UR QL STRIP: NEGATIVE
BUN SERPL-MCNC: 11 MG/DL (ref 5–25)
CALCIUM SERPL-MCNC: 8.7 MG/DL (ref 8.4–10.2)
CHLORIDE SERPL-SCNC: 100 MMOL/L (ref 96–108)
CHOLEST SERPL-MCNC: 134 MG/DL (ref ?–200)
CLARITY UR: ABNORMAL
CO2 SERPL-SCNC: 29 MMOL/L (ref 21–32)
COLOR UR: ABNORMAL
CREAT SERPL-MCNC: 0.77 MG/DL (ref 0.6–1.3)
ERYTHROCYTE [DISTWIDTH] IN BLOOD BY AUTOMATED COUNT: 12.7 % (ref 11.6–15.1)
EST. AVERAGE GLUCOSE BLD GHB EST-MCNC: 166 MG/DL
GFR SERPL CREATININE-BSD FRML MDRD: 86 ML/MIN/1.73SQ M
GLUCOSE P FAST SERPL-MCNC: 177 MG/DL (ref 65–99)
GLUCOSE UR STRIP-MCNC: NEGATIVE MG/DL
HBA1C MFR BLD: 7.4 %
HCT VFR BLD AUTO: 42.7 % (ref 36.5–49.3)
HDLC SERPL-MCNC: 75 MG/DL
HGB BLD-MCNC: 14 G/DL (ref 12–17)
HGB UR QL STRIP.AUTO: ABNORMAL
KETONES UR STRIP-MCNC: NEGATIVE MG/DL
LDLC SERPL CALC-MCNC: 42 MG/DL (ref 0–100)
LEUKOCYTE ESTERASE UR QL STRIP: ABNORMAL
MCH RBC QN AUTO: 28.9 PG (ref 26.8–34.3)
MCHC RBC AUTO-ENTMCNC: 32.8 G/DL (ref 31.4–37.4)
MCV RBC AUTO: 88 FL (ref 82–98)
NITRITE UR QL STRIP: NEGATIVE
NON-SQ EPI CELLS URNS QL MICRO: ABNORMAL /HPF
NONHDLC SERPL-MCNC: 59 MG/DL
PH UR STRIP.AUTO: 6.5 [PH]
PLATELET # BLD AUTO: 164 THOUSANDS/UL (ref 149–390)
PMV BLD AUTO: 10.5 FL (ref 8.9–12.7)
POTASSIUM SERPL-SCNC: 3 MMOL/L (ref 3.5–5.3)
PROT SERPL-MCNC: 7.1 G/DL (ref 6.4–8.4)
PROT UR STRIP-MCNC: ABNORMAL MG/DL
RBC # BLD AUTO: 4.84 MILLION/UL (ref 3.88–5.62)
RBC #/AREA URNS AUTO: ABNORMAL /HPF
SODIUM SERPL-SCNC: 141 MMOL/L (ref 135–147)
SP GR UR STRIP.AUTO: 1.02 (ref 1–1.03)
TRIGL SERPL-MCNC: 87 MG/DL (ref ?–150)
UROBILINOGEN UR STRIP-ACNC: <2 MG/DL
WBC # BLD AUTO: 9.04 THOUSAND/UL (ref 4.31–10.16)
WBC #/AREA URNS AUTO: ABNORMAL /HPF

## 2025-05-23 PROCEDURE — 87086 URINE CULTURE/COLONY COUNT: CPT

## 2025-05-23 PROCEDURE — 80061 LIPID PANEL: CPT

## 2025-05-23 PROCEDURE — 85027 COMPLETE CBC AUTOMATED: CPT

## 2025-05-23 PROCEDURE — 80053 COMPREHEN METABOLIC PANEL: CPT

## 2025-05-23 PROCEDURE — 81001 URINALYSIS AUTO W/SCOPE: CPT

## 2025-05-23 PROCEDURE — 36415 COLL VENOUS BLD VENIPUNCTURE: CPT

## 2025-05-23 PROCEDURE — 83036 HEMOGLOBIN GLYCOSYLATED A1C: CPT

## 2025-05-23 RX ORDER — CIPROFLOXACIN 500 MG/1
500 TABLET, FILM COATED ORAL EVERY 12 HOURS SCHEDULED
Qty: 14 TABLET | Refills: 0 | Status: SHIPPED | OUTPATIENT
Start: 2025-05-23 | End: 2025-05-30

## 2025-05-23 NOTE — TELEPHONE ENCOUNTER
"FOLLOW UP: Appointment this morning at 915 with Dr Valencia.    REASON FOR CONVERSATION: Blood in Urine    SYMPTOMS: Urinated blood this morning. Has no pain, fever or urgency. June that he is bloated or having discomfort. No clots just red blood in toilet.     OTHER:  per wife is only giving her some information as he does not want to worry anyone. He did state he has this before and it stopped so he never told anyone. I advised since he is on Eliquis and having this as a new symptom today to come in to be evaluated. She and patient agree.     DISPOSITION: Go to [facility]      Reason for Disposition   Taking Coumadin (warfarin) or other strong blood thinner, or known bleeding disorder (e.g., thrombocytopenia)    Answer Assessment - Initial Assessment Questions  1. COLOR of URINE: \"Describe the color of the urine.\"  (e.g., tea-colored, pink, red, bloody) \"Do you have blood clots in your urine?\" (e.g., none, pea, grape, small coin)      Blood with urination, no clots   2. ONSET: \"When did the bleeding start?\"       Just started this morning, but wife states patient told her that it has happened before but it stopped so he did not bring it up to her.    3. EPISODES: \"How many times has there been blood in the urine?\" or \"How many times today?\"      One time today this morning    4. PAIN with URINATION: \"Is there any pain with passing your urine?\" If Yes, ask: \"How bad is the pain?\"  (Scale 1-10; or mild, moderate, severe)      No pain with urination    5. FEVER: \"Do you have a fever?\" If Yes, ask: \"What is your temperature, how was it measured, and when did it start?\"      No fever    6. ASSOCIATED SYMPTOMS: \"Are you passing urine more frequently than usual?\"      Not urinating more frequently    7. OTHER SYMPTOMS: \"Do you have any other symptoms?\" (e.g., back/flank pain, abdomen pain, vomiting)      No other symptoms to report    Protocols used: Urine - Blood In-Adult-OH    "

## 2025-05-23 NOTE — PROGRESS NOTES
Name: Phil Hanna .      : 1945      MRN: [id number]  Encounter Provider: Nely Valencia DO  Encounter Date: 2025   Encounter department: [facility]  :  Assessment & Plan  Gross hematuria    Orders:  •  UA (URINE) with reflex to Scope; Future  •  Urine culture; Future  •  ciprofloxacin (CIPRO) 500 mg tablet; Take 1 tablet (500 mg total) by mouth every 12 (twelve) hours for 7 days  •  CBC and Platelet; Future  •  Basic metabolic panel; Future  •  US bladder; Future  Pt to proceed to ER if sxs progress He will get labs and submit urine today   Stay hydrated   Hold aspirin for now will need Urology eval as concern for bladder lesion   Primary hypertension  Continjue current rx Lo sodium diet        Type 2 diabetes mellitus with hyperglycemia, without long-term current use of insulin ([facility])    Lab Results   Component Value Date    HGBA1C 7.4 (H) 2025   Pt willget repeat labs today Lo carb diet encouraged                History of Present Illness   HPIPt has bright red blood in urine for about 2 weeks NO pain No fever No clots He states he had similar sxs months ago that self resolved after a few days so he did not report He feels it is lessening No melena Urinary flow unchanged He has normal appetite and normal bowels No chest pain, sob or dizziness   Review of Systems   Constitutional:  Negative for activity change, chills, fatigue and fever.   HENT: Negative.     Eyes:  Negative for visual disturbance.   Respiratory: Negative.     Cardiovascular: Negative.    Gastrointestinal:  Negative for abdominal distention, abdominal pain and blood in stool.   Genitourinary:  Positive for hematuria. Negative for difficulty urinating, dysuria, flank pain and frequency.   Musculoskeletal:  Positive for arthralgias.   Skin:  Negative for color change and rash.   Neurological:  Negative for dizziness, light-headedness and headaches.   Psychiatric/Behavioral:  Negative for sleep disturbance. The  "patient is not nervous/anxious.        Objective   /78   Pulse 94   Temp (!) 97.3 °F (36.3 °C) (Temporal)   Resp 18   Ht 5' 9\" (1.753 m)   Wt 114 kg (252 lb)   SpO2 96%   BMI 37.21 kg/m²      Physical Exam  Vitals and nursing note reviewed.   Constitutional:       General: He is not in acute distress.     Appearance: Normal appearance. He is not ill-appearing, toxic-appearing or diaphoretic.   HENT:      Head: Normocephalic.      Right Ear: External ear normal.      Left Ear: External ear normal.      Nose: Nose normal.      Mouth/Throat:      Mouth: Mucous membranes are moist.     Eyes:      General: No scleral icterus.      Cardiovascular:      Rate and Rhythm: Normal rate. Rhythm irregular.      Pulses: Normal pulses.   Pulmonary:      Effort: Pulmonary effort is normal. No respiratory distress.      Breath sounds: Normal breath sounds. No wheezing.   Abdominal:      General: There is no distension.      Palpations: Abdomen is soft.      Tenderness: There is no abdominal tenderness.     Musculoskeletal:      Cervical back: Normal range of motion and neck supple.      Right lower leg: No edema.      Left lower leg: No edema.   Lymphadenopathy:      Cervical: No cervical adenopathy.     Skin:     General: Skin is warm and dry.      Coloration: Skin is not jaundiced or pale.     Neurological:      General: No focal deficit present.      Mental Status: He is alert and oriented to person, place, and time. Mental status is at baseline.      Cranial Nerves: No cranial nerve deficit.      Sensory: No sensory deficit.     Psychiatric:         Mood and Affect: Mood normal.         Behavior: Behavior normal.         Thought Content: Thought content normal.         Judgment: Judgment normal.       "

## 2025-05-24 LAB — BACTERIA UR CULT: NORMAL

## 2025-05-24 NOTE — ASSESSMENT & PLAN NOTE
Lab Results   Component Value Date    HGBA1C 7.4 (H) 05/23/2025   Pt willget repeat labs today Lo carb diet encouraged

## 2025-05-27 ENCOUNTER — TELEPHONE (OUTPATIENT)
Age: 80
End: 2025-05-27

## 2025-05-27 ENCOUNTER — HOSPITAL ENCOUNTER (OUTPATIENT)
Dept: ULTRASOUND IMAGING | Facility: HOSPITAL | Age: 80
Discharge: HOME | End: 2025-05-27
Attending: INTERNAL MEDICINE
Payer: MEDICARE

## 2025-05-27 DIAGNOSIS — C68.9 UROTHELIAL CARCINOMA (HCC): Primary | ICD-10-CM

## 2025-05-27 DIAGNOSIS — R31.0 GROSS HEMATURIA: ICD-10-CM

## 2025-05-27 PROCEDURE — 76857 US EXAM PELVIC LIMITED: CPT

## 2025-05-27 NOTE — TELEPHONE ENCOUNTER
Call placed to patient and scheduled him for a cystoscopy with Dr. Loomis on 6/3 @ 2:00 pm in the [place] office. Procedure was explained to patient and he was advised to arrive 15 minutes early with a comfortably full bladder to provide a urine sample. New office address was provided.

## 2025-05-27 NOTE — TELEPHONE ENCOUNTER
ASAP Npt Referral Urothelial carcinoma,please review records results if time sensitive/scheduling protocol.  4.1 cm intravesicular nodule is concerning for urothelial carcinoma. Urology consultation and cystoscopy is advised.

## 2025-05-28 ENCOUNTER — TELEPHONE (OUTPATIENT)
Age: 80
End: 2025-05-28

## 2025-05-28 ENCOUNTER — RESULTS FOLLOW-UP (OUTPATIENT)
Dept: ENDOCRINOLOGY | Facility: CLINIC | Age: 80
End: 2025-05-28

## 2025-05-28 DIAGNOSIS — E87.6 HYPOKALEMIA: ICD-10-CM

## 2025-05-28 RX ORDER — POTASSIUM CHLORIDE 1500 MG/1
20 TABLET, EXTENDED RELEASE ORAL 2 TIMES DAILY
Qty: 60 TABLET | Refills: 3 | Status: SHIPPED | OUTPATIENT
Start: 2025-05-28

## 2025-05-28 NOTE — TELEPHONE ENCOUNTER
Patient's wife, Nancie is calling regarding patient's meds.  He was told to double up on his Potassium but another prescription was not sent in and he will be running out of them.  Will need a new prescription for that.  Also, she is asking if she should start taking the daily aspirin again.  Please advise.  Thank you.

## 2025-05-31 DIAGNOSIS — M19.90 ARTHRITIS: ICD-10-CM

## 2025-06-02 RX ORDER — PREDNISONE 5 MG/1
5 TABLET ORAL DAILY
Qty: 30 TABLET | Refills: 5 | Status: SHIPPED | OUTPATIENT
Start: 2025-06-02

## 2025-06-03 ENCOUNTER — OFFICE VISIT (OUTPATIENT)
Age: 80
End: 2025-06-03
Attending: INTERNAL MEDICINE
Payer: MEDICARE

## 2025-06-03 VITALS
OXYGEN SATURATION: 97 % | WEIGHT: 250.6 LBS | HEART RATE: 94 BPM | HEIGHT: 69 IN | TEMPERATURE: 98 F | BODY MASS INDEX: 37.12 KG/M2 | SYSTOLIC BLOOD PRESSURE: 144 MMHG | DIASTOLIC BLOOD PRESSURE: 82 MMHG

## 2025-06-03 DIAGNOSIS — N32.89 BLADDER MASS: ICD-10-CM

## 2025-06-03 DIAGNOSIS — R31.0 GROSS HEMATURIA: Primary | ICD-10-CM

## 2025-06-03 DIAGNOSIS — C67.2 MALIGNANT NEOPLASM OF LATERAL WALL OF URINARY BLADDER (HCC): ICD-10-CM

## 2025-06-03 DIAGNOSIS — R45.89 ANXIETY ABOUT HEALTH: ICD-10-CM

## 2025-06-03 LAB
SL AMB  POCT GLUCOSE, UA: NORMAL
SL AMB LEUKOCYTE ESTERASE,UA: NORMAL
SL AMB POCT BILIRUBIN,UA: NORMAL
SL AMB POCT BLOOD,UA: NORMAL
SL AMB POCT CLARITY,UA: NORMAL
SL AMB POCT COLOR,UA: NORMAL
SL AMB POCT KETONES,UA: NORMAL
SL AMB POCT NITRITE,UA: NORMAL
SL AMB POCT PH,UA: 6
SL AMB POCT SPECIFIC GRAVITY,UA: 1.01
SL AMB POCT URINE PROTEIN: NORMAL
SL AMB POCT UROBILINOGEN: 0.2

## 2025-06-03 PROCEDURE — 99214 OFFICE O/P EST MOD 30 MIN: CPT | Performed by: UROLOGY

## 2025-06-03 PROCEDURE — 81002 URINALYSIS NONAUTO W/O SCOPE: CPT | Performed by: UROLOGY

## 2025-06-03 PROCEDURE — 52000 CYSTOURETHROSCOPY: CPT | Performed by: UROLOGY

## 2025-06-03 RX ORDER — CEPHALEXIN 500 MG/1
500 CAPSULE ORAL EVERY 12 HOURS SCHEDULED
Qty: 6 CAPSULE | Refills: 0 | Status: SHIPPED | OUTPATIENT
Start: 2025-06-03 | End: 2025-06-06

## 2025-06-03 RX ORDER — DIAZEPAM 10 MG/1
10 TABLET ORAL EVERY 6 HOURS PRN
Qty: 20 TABLET | Refills: 0 | Status: SHIPPED | OUTPATIENT
Start: 2025-06-03

## 2025-06-03 RX ORDER — CEPHALEXIN 500 MG/1
500 CAPSULE ORAL ONCE
Qty: 1 CAPSULE | Refills: 0 | Status: SHIPPED | OUTPATIENT
Start: 2025-06-03 | End: 2025-06-03

## 2025-06-03 NOTE — TELEPHONE ENCOUNTER
Patient's spouse called in stating an antibiotic was supposed to be called in after his cysto today. Reached out to [facility] via secure chat and will send over abx. Informed patient of this

## 2025-06-03 NOTE — PROGRESS NOTES
UROLOGY PROGRESS NOTE   [facility]  [address]  [address]  [place] [address]  [phone]  Fax:[phone]  [email or web address]      NAME: Phil Hanna Sr.  AGE: 80 y.o. SEX: male  : 1945   MRN: [id number]    DATE: 6/3/2025  TIME: 2:44 PM    Assessment and Plan:    Bladder cancer, 4 cm tumor left lateral wall-plan TURBT under general anesthesia with paralysis at [facility] in the near future.  With mitomycin.  The procedure as well as the risk of bleeding infection bladder perforation and need for additional procedures been explained and gives informed consent.  We will schedule this.  Severely anxious about this-Valium 10 mg take 1 every 6 hours as needed called in.    1. Gross hematuria  -     Cystoscopy  -     POCT urine dip  2. Bladder mass  -     Cystoscopy  -     POCT urine dip  3. Malignant neoplasm of lateral wall of urinary bladder (HCC)  -     Case request operating room: TRANSURETHRAL RESECTION OF BLADDER TUMOR (TURBT); Standing  -     EKG 12 lead; Future  -     Case request operating room: TRANSURETHRAL RESECTION OF BLADDER TUMOR (TURBT)  -     diazepam (VALIUM) 10 mg tablet; Take 1 tablet (10 mg total) by mouth every 6 (six) hours as needed for anxiety Take 1 hour prior to biopsy  4. Anxiety about health  -     diazepam (VALIUM) 10 mg tablet; Take 1 tablet (10 mg total) by mouth every 6 (six) hours as needed for anxiety Take 1 hour prior to biopsy                  Chief Complaint   No chief complaint on file.      History of Present Illness   80-year-old man, established patient with new to me-seen by Mr. Delcid 2022 for bilateral hydroceles.  He experienced bright red blood in the urine for about 2 weeks prior to May 23, 2025 when he was seen in the office by Dr. Valencia.  An ultrasound of the bladder was ordered which showed a 4.1 cm intravesical nodule concerning for bladder cancer.  Here for cystoscopy for this.  Previous upper tract  imaging was a CT scan of the abdomen pelvis March 2, 2023 which showed a normal bladder and normal kidneys.  He is on Eliquis and aspirin.  Urine culture that day was negative.  Urinalysis with microscopy showed innumerable red blood cells, 10-20 white blood cells and no bacteria.       Cystoscopy     Date/Time  6/3/2025 2:00 PM     Performed by  Bunny Loomis MD   Authorized by  Bunny Loomis MD       Universal Protocol:  Consent: Verbal consent obtained. Written consent obtained      Procedure Details:  Procedure type: cystoscopy    Additional Procedure Details: Cystoscopy Procedure Note        Pre-operative Diagnosis: Gross hematuria, bladder mass    Post-operative Diagnosis: Bladder cancer, 4 cm tumor left lateral wall    Procedure: Flexible cystoscopy    Surgeon: Bunny Loomis MD    Anesthesia: 1% Xylocaine per urethra    EBL: Minimal    Complications: none    Procedure Details   The risks, benefits, complications, treatment options, and expected outcomes were discussed with the patient. The patient concurred with the proposed plan, giving informed consent.    Cystoscopy was performed today under local anesthesia, using sterile technique. The patient was placed in the supine position, prepped with Betadine, and draped in the usual sterile fashion. The flexible cystocope was used to inspect both the urethra and bladder    Findings:  Urethra: Normal without stricture.  Prostate unremarkable.    Bladder: Limited view due to tumor, but has bladder tumor left lateral wall.           Specimens: None                 Complications:  None           Disposition: To home            Condition:  Stable              The following portions of the patient's history were reviewed and updated as appropriate: allergies, current medications, past family history, past medical history, past social history, past surgical history and problem list.  Past Medical History[1]  Past Surgical History[2]  shoulder  Review of Systems   Review  "of Systems    Active Problem List   Problem List[3]    Objective   /82   Pulse 94   Temp 98 °F (36.7 °C)   Ht 5' 9\" (1.753 m)   Wt 114 kg (250 lb 9.6 oz)   SpO2 97%   BMI 37.01 kg/m²     Physical Exam  Vitals reviewed.   Constitutional:       Appearance: Normal appearance.   HENT:      Head: Normocephalic and atraumatic.     Eyes:      General: Scleral icterus: valium.      Extraocular Movements: Extraocular movements intact.       Cardiovascular:      Rate and Rhythm: Tachycardia present.      Heart sounds: Normal heart sounds.   Pulmonary:      Effort: Pulmonary effort is normal.      Breath sounds: Normal breath sounds.   Genitourinary:     Penis: Normal.      Musculoskeletal:         General: Normal range of motion.      Cervical back: Normal range of motion.     Skin:     General: Skin is warm and dry.      Coloration: Skin is not jaundiced or pale.     Neurological:      General: No focal deficit present.      Mental Status: He is alert and oriented to person, place, and time.     Psychiatric:         Mood and Affect: Mood normal.         Behavior: Behavior normal.         Thought Content: Thought content normal.         Judgment: Judgment normal.             Current Medications   Current Medications[4]        Bunny Loomis MD                [1]   Past Medical History:  Diagnosis Date    Atrial fibrillation (HCC)     Colon polyp     Hyperlipidemia     Hypertension     Nonocclusive coronary atherosclerosis of native coronary artery 03/26/2015   [2]   Past Surgical History:  Procedure Laterality Date    APPENDECTOMY      BACK SURGERY  1996    disc repair    CARDIAC CATHETERIZATION      50% lesion of mid LAD, 40% lesion of first obtuse marginal lesion. Last assessed 1/17/2018     CARDIAC CATHETERIZATION N/A 3/13/2023    Procedure: Cardiac catheterization;  Surgeon: Amanda Mars DO;  [facility];  Service: Cardiology    CARDIAC CATHETERIZATION N/A 3/13/2023    Procedure: Cardiac " Coronary Angiogram;  Surgeon: Amanda Mars DO;  [facility];  Service: Cardiology    CARDIAC CATHETERIZATION N/A 4/18/2023    Procedure: Cardiac tavr;  Surgeon: Handy García DO;  [facility];  Service: Cardiology    CHOLECYSTECTOMY      COLON SURGERY      colon ressection    COLONOSCOPY N/A 01/14/2019    Procedure: COLONOSCOPY;  Surgeon: HAFSA Armijo MD;  Location: [facility];  Service: Colorectal    COLONOSCOPY  08/08/2022    CYSTOSCOPY      Diagnostic     HEMORRHOID SURGERY  08/2011    MS REPLACE AORTIC VALVE OPENFEMORAL ARTERY APPROACH N/A 4/18/2023    Procedure: REPLACEMENT AORTIC VALVE TRANSCATHETER (TAVR) TRANSFEMORAL W/ 26MM BROWN VANESA S3 ULTRA VALVE(ACCESS ON RIGHT) JC;  Surgeon: NILDA Garner MD;  [facility];  Service: Cardiac Surgery    TONSILLECTOMY     [3]   Patient Active Problem List  Diagnosis    Severe aortic stenosis    Chronic diastolic CHF (congestive heart failure) (HCC)    Nonocclusive coronary atherosclerosis of native coronary artery    Dyslipidemia    Primary hypertension    Obstructive sleep apnea of adult    Longstanding persistent atrial fibrillation (HCC)    Paroxysmal atrial tachycardia (HCC)    Erectile dysfunction    GERD without esophagitis    Renovascular hypertension    Rheumatoid arthritis (HCC)    History of colon polyps    Mixed hyperlipidemia    Hypertensive heart disease with congestive heart failure (HCC)    Daily consumption of alcohol    S/P TAVR (transcatheter aortic valve replacement)    Encounter for postoperative care    Type 2 diabetes mellitus with hyperosmolarity without coma, without long-term current use of insulin (HCC)    Type 2 diabetes mellitus with hyperglycemia, without long-term current use of insulin (HCC)   [4]   Current Outpatient Medications:     apixaban (Eliquis) 5 mg, TAKE ONE TABLET BY MOUTH TWICE A DAY., Disp: 60 tablet, Rfl: 5    aspirin (Aspirin Low Dose) 81 mg chewable tablet,  Chew 1 tablet (81 mg total) daily., Disp: 30 tablet, Rfl: 5    diazepam (VALIUM) 10 mg tablet, Take 1 tablet (10 mg total) by mouth every 6 (six) hours as needed for anxiety Take 1 hour prior to biopsy, Disp: 20 tablet, Rfl: 0    ezetimibe (ZETIA) 10 mg tablet, Take 1 tablet by mouth daily, Disp: 30 tablet, Rfl: 4    folic acid (FOLVITE) 1 mg tablet, TAKE (1) TABLET BY MOUTH DAILY., Disp: 30 tablet, Rfl: 5    furosemide (LASIX) 40 mg tablet, TAKE ONE TABLET BY MOUTH TWICE A DAY., Disp: 60 tablet, Rfl: 4    lisinopril (ZESTRIL) 20 mg tablet, TAKE (1) TABLET BY MOUTH TWICE DAILY., Disp: 60 tablet, Rfl: 4    losartan (COZAAR) 100 MG tablet, TAKE (1) TABLET BY MOUTH DAILY., Disp: 30 tablet, Rfl: 4    metoprolol tartrate (LOPRESSOR) 50 mg tablet, Take 1 tablet (50 mg total) by mouth every 12 (twelve) hours, Disp: 60 tablet, Rfl: 4    NIFEdipine (PROCARDIA XL) 90 mg 24 hr tablet, TAKE 1 TABLET DAILY., Disp: 30 tablet, Rfl: 4    omeprazole (PriLOSEC) 20 mg delayed release capsule, TAKE (1) CAPSULE DAILY., Disp: 30 capsule, Rfl: 5    potassium chloride (Klor-Con M20) 20 mEq tablet, Take 1 tablet (20 mEq total) by mouth 2 (two) times a day, Disp: 60 tablet, Rfl: 3    predniSONE 5 mg tablet, TAKE ONE TABLET DAILY., Disp: 30 tablet, Rfl: 5    rosuvastatin (CRESTOR) 20 MG tablet, TAKE ONE TABLET BY MOUTH DAILY., Disp: 30 tablet, Rfl: 4    Blood Glucose Monitoring Suppl (OneTouch Verio Reflect) w/Device KIT, Check blood sugars once daily. Please substitute with appropriate alternative as covered by patient's insurance. Dx: E11.65 (Patient not taking: Reported on 6/3/2025), Disp: 1 kit, Rfl: 0    glucose blood (OneTouch Verio) test strip, Check blood sugars once daily. Please substitute with appropriate alternative as covered by patient's insurance. Dx: E11.65 (Patient not taking: Reported on 6/3/2025), Disp: 100 each, Rfl: 3    OneTouch Delica Lancets 33G MISC, Check blood sugars once daily. Please substitute with appropriate  alternative as covered by patient's insurance. Dx: E11.65 (Patient not taking: Reported on 6/3/2025), Disp: 100 each, Rfl: 3

## 2025-06-03 NOTE — H&P
UROLOGY PROGRESS NOTE   [facility]  [address]  [address]  [place] [address]  [phone]  Fax:[phone]  [email or web address]      NAME: Phil Hanna Sr.  AGE: 80 y.o. SEX: male  : 1945   MRN: [id number]    DATE: 6/3/2025  TIME: 2:44 PM    Assessment and Plan:    Bladder cancer, 4 cm tumor left lateral wall-plan TURBT under general anesthesia with paralysis at [facility] in the near future.  With mitomycin.  The procedure as well as the risk of bleeding infection bladder perforation and need for additional procedures been explained and gives informed consent.  We will schedule this.  Severely anxious about this-Valium 10 mg take 1 every 6 hours as needed called in.    1. Gross hematuria  -     Cystoscopy  -     POCT urine dip  2. Bladder mass  -     Cystoscopy  -     POCT urine dip  3. Malignant neoplasm of lateral wall of urinary bladder (HCC)  -     Case request operating room: TRANSURETHRAL RESECTION OF BLADDER TUMOR (TURBT); Standing  -     EKG 12 lead; Future  -     Case request operating room: TRANSURETHRAL RESECTION OF BLADDER TUMOR (TURBT)  -     diazepam (VALIUM) 10 mg tablet; Take 1 tablet (10 mg total) by mouth every 6 (six) hours as needed for anxiety Take 1 hour prior to biopsy  4. Anxiety about health  -     diazepam (VALIUM) 10 mg tablet; Take 1 tablet (10 mg total) by mouth every 6 (six) hours as needed for anxiety Take 1 hour prior to biopsy                  Chief Complaint   No chief complaint on file.      History of Present Illness   80-year-old man, established patient with new to me-seen by Mr. Delcid 2022 for bilateral hydroceles.  He experienced bright red blood in the urine for about 2 weeks prior to May 23, 2025 when he was seen in the office by Dr. Valencia.  An ultrasound of the bladder was ordered which showed a 4.1 cm intravesical nodule concerning for bladder cancer.  Here for cystoscopy for this.  Previous upper tract  imaging was a CT scan of the abdomen pelvis March 2, 2023 which showed a normal bladder and normal kidneys.  He is on Eliquis and aspirin.  Urine culture that day was negative.  Urinalysis with microscopy showed innumerable red blood cells, 10-20 white blood cells and no bacteria.       Cystoscopy     Date/Time  6/3/2025 2:00 PM     Performed by  Bunny Loomis MD   Authorized by  Bunny Loomis MD       Universal Protocol:  Consent: Verbal consent obtained. Written consent obtained      Procedure Details:  Procedure type: cystoscopy    Additional Procedure Details: Cystoscopy Procedure Note        Pre-operative Diagnosis: Gross hematuria, bladder mass    Post-operative Diagnosis: Bladder cancer, 4 cm tumor left lateral wall    Procedure: Flexible cystoscopy    Surgeon: Bunny Loomis MD    Anesthesia: 1% Xylocaine per urethra    EBL: Minimal    Complications: none    Procedure Details   The risks, benefits, complications, treatment options, and expected outcomes were discussed with the patient. The patient concurred with the proposed plan, giving informed consent.    Cystoscopy was performed today under local anesthesia, using sterile technique. The patient was placed in the supine position, prepped with Betadine, and draped in the usual sterile fashion. The flexible cystocope was used to inspect both the urethra and bladder    Findings:  Urethra: Normal without stricture.  Prostate unremarkable.    Bladder: Limited view due to tumor, but has bladder tumor left lateral wall.           Specimens: None                 Complications:  None           Disposition: To home            Condition:  Stable              The following portions of the patient's history were reviewed and updated as appropriate: allergies, current medications, past family history, past medical history, past social history, past surgical history and problem list.  Past Medical History[1]  Past Surgical History[2]  shoulder  Review of Systems   Review  "of Systems    Active Problem List   Problem List[3]    Objective   /82   Pulse 94   Temp 98 °F (36.7 °C)   Ht 5' 9\" (1.753 m)   Wt 114 kg (250 lb 9.6 oz)   SpO2 97%   BMI 37.01 kg/m²     Physical Exam  Vitals reviewed.   Constitutional:       Appearance: Normal appearance.   HENT:      Head: Normocephalic and atraumatic.     Eyes:      General: Scleral icterus: valium.      Extraocular Movements: Extraocular movements intact.       Cardiovascular:      Rate and Rhythm: Tachycardia present.      Heart sounds: Normal heart sounds.   Pulmonary:      Effort: Pulmonary effort is normal.      Breath sounds: Normal breath sounds.   Genitourinary:     Penis: Normal.      Musculoskeletal:         General: Normal range of motion.      Cervical back: Normal range of motion.     Skin:     General: Skin is warm and dry.      Coloration: Skin is not jaundiced or pale.     Neurological:      General: No focal deficit present.      Mental Status: He is alert and oriented to person, place, and time.     Psychiatric:         Mood and Affect: Mood normal.         Behavior: Behavior normal.         Thought Content: Thought content normal.         Judgment: Judgment normal.             Current Medications   Current Medications[4]        Bunny Loomis MD                 [1]   Past Medical History:  Diagnosis Date    Atrial fibrillation (HCC)     Colon polyp     Hyperlipidemia     Hypertension     Nonocclusive coronary atherosclerosis of native coronary artery 03/26/2015   [2]   Past Surgical History:  Procedure Laterality Date    APPENDECTOMY      BACK SURGERY  1996    disc repair    CARDIAC CATHETERIZATION      50% lesion of mid LAD, 40% lesion of first obtuse marginal lesion. Last assessed 1/17/2018     CARDIAC CATHETERIZATION N/A 3/13/2023    Procedure: Cardiac catheterization;  Surgeon: Amanda Mars DO;  [facility];  Service: Cardiology    CARDIAC CATHETERIZATION N/A 3/13/2023    Procedure: Cardiac " Coronary Angiogram;  Surgeon: Amanda Mars DO;  [facility];  Service: Cardiology    CARDIAC CATHETERIZATION N/A 4/18/2023    Procedure: Cardiac tavr;  Surgeon: Handy García DO;  [facility];  Service: Cardiology    CHOLECYSTECTOMY      COLON SURGERY      colon ressection    COLONOSCOPY N/A 01/14/2019    Procedure: COLONOSCOPY;  Surgeon: HAFSA Armijo MD;  [facility]: [facility];  Service: Colorectal    COLONOSCOPY  08/08/2022    CYSTOSCOPY      Diagnostic     HEMORRHOID SURGERY  08/2011    ND REPLACE AORTIC VALVE OPENFEMORAL ARTERY APPROACH N/A 4/18/2023    Procedure: REPLACEMENT AORTIC VALVE TRANSCATHETER (TAVR) TRANSFEMORAL W/ 26MM BROWN VANESA S3 ULTRA VALVE(ACCESS ON RIGHT) JC;  Surgeon: NILDA Garner MD;  [facility];  Service: Cardiac Surgery    TONSILLECTOMY     [3]   Patient Active Problem List  Diagnosis    Severe aortic stenosis    Chronic diastolic CHF (congestive heart failure) (HCC)    Nonocclusive coronary atherosclerosis of native coronary artery    Dyslipidemia    Primary hypertension    Obstructive sleep apnea of adult    Longstanding persistent atrial fibrillation (HCC)    Paroxysmal atrial tachycardia (HCC)    Erectile dysfunction    GERD without esophagitis    Renovascular hypertension    Rheumatoid arthritis (HCC)    History of colon polyps    Mixed hyperlipidemia    Hypertensive heart disease with congestive heart failure (HCC)    Daily consumption of alcohol    S/P TAVR (transcatheter aortic valve replacement)    Encounter for postoperative care    Type 2 diabetes mellitus with hyperosmolarity without coma, without long-term current use of insulin (HCC)    Type 2 diabetes mellitus with hyperglycemia, without long-term current use of insulin (HCC)   [4]   Current Outpatient Medications:     apixaban (Eliquis) 5 mg, TAKE ONE TABLET BY MOUTH TWICE A DAY., Disp: 60 tablet, Rfl: 5    aspirin (Aspirin Low Dose) 81 mg chewable tablet,  Chew 1 tablet (81 mg total) daily., Disp: 30 tablet, Rfl: 5    diazepam (VALIUM) 10 mg tablet, Take 1 tablet (10 mg total) by mouth every 6 (six) hours as needed for anxiety Take 1 hour prior to biopsy, Disp: 20 tablet, Rfl: 0    ezetimibe (ZETIA) 10 mg tablet, Take 1 tablet by mouth daily, Disp: 30 tablet, Rfl: 4    folic acid (FOLVITE) 1 mg tablet, TAKE (1) TABLET BY MOUTH DAILY., Disp: 30 tablet, Rfl: 5    furosemide (LASIX) 40 mg tablet, TAKE ONE TABLET BY MOUTH TWICE A DAY., Disp: 60 tablet, Rfl: 4    lisinopril (ZESTRIL) 20 mg tablet, TAKE (1) TABLET BY MOUTH TWICE DAILY., Disp: 60 tablet, Rfl: 4    losartan (COZAAR) 100 MG tablet, TAKE (1) TABLET BY MOUTH DAILY., Disp: 30 tablet, Rfl: 4    metoprolol tartrate (LOPRESSOR) 50 mg tablet, Take 1 tablet (50 mg total) by mouth every 12 (twelve) hours, Disp: 60 tablet, Rfl: 4    NIFEdipine (PROCARDIA XL) 90 mg 24 hr tablet, TAKE 1 TABLET DAILY., Disp: 30 tablet, Rfl: 4    omeprazole (PriLOSEC) 20 mg delayed release capsule, TAKE (1) CAPSULE DAILY., Disp: 30 capsule, Rfl: 5    potassium chloride (Klor-Con M20) 20 mEq tablet, Take 1 tablet (20 mEq total) by mouth 2 (two) times a day, Disp: 60 tablet, Rfl: 3    predniSONE 5 mg tablet, TAKE ONE TABLET DAILY., Disp: 30 tablet, Rfl: 5    rosuvastatin (CRESTOR) 20 MG tablet, TAKE ONE TABLET BY MOUTH DAILY., Disp: 30 tablet, Rfl: 4    Blood Glucose Monitoring Suppl (OneTouch Verio Reflect) w/Device KIT, Check blood sugars once daily. Please substitute with appropriate alternative as covered by patient's insurance. Dx: E11.65 (Patient not taking: Reported on 6/3/2025), Disp: 1 kit, Rfl: 0    glucose blood (OneTouch Verio) test strip, Check blood sugars once daily. Please substitute with appropriate alternative as covered by patient's insurance. Dx: E11.65 (Patient not taking: Reported on 6/3/2025), Disp: 100 each, Rfl: 3    OneTouch Delica Lancets 33G MISC, Check blood sugars once daily. Please substitute with appropriate  alternative as covered by patient's insurance. Dx: E11.65 (Patient not taking: Reported on 6/3/2025), Disp: 100 each, Rfl: 3

## 2025-06-05 ENCOUNTER — PREP FOR PROCEDURE (OUTPATIENT)
Dept: UROLOGY | Facility: CLINIC | Age: 80
End: 2025-06-05

## 2025-06-05 ENCOUNTER — TELEPHONE (OUTPATIENT)
Dept: UROLOGY | Facility: CLINIC | Age: 80
End: 2025-06-05

## 2025-06-05 DIAGNOSIS — Z01.818 ENCOUNTER FOR PREADMISSION TESTING: Primary | ICD-10-CM

## 2025-06-05 DIAGNOSIS — R39.89 SUSPECTED UTI: ICD-10-CM

## 2025-06-05 NOTE — TELEPHONE ENCOUNTER
Spoke with patient and confirmed surgery date of: 7/9  Type of surgery: TURBT w/ [place]  Operating physician: Dr. Loomis  Location of surgery: [facility]    Verbally went over prep with patient on: 6/5  NPO  Bowel prep? No  Hospital calls afternoon prior with arrival time -Calls Friday afternoon for Monday surgeries  Patient needs ride to and from surgery (outpatient)   Pre-op testing to be done 2 weeks prior to surgery  (CBC, BMP, UC, EKG)  Blood thinners: Eliquis, ASA  No hold needed  Clearances needed: (None)    Mailed to patient on: 6/5  Copy of packet scanned into Media on: 6/5  Labs in packet  Soap / Bowel prep in packet  Post-op in packet  Date    Consent: in Media

## 2025-06-23 ENCOUNTER — APPOINTMENT (OUTPATIENT)
Dept: LAB | Facility: HOSPITAL | Age: 80
End: 2025-06-23
Payer: MEDICARE

## 2025-06-23 ENCOUNTER — OFFICE VISIT (OUTPATIENT)
Dept: LAB | Facility: HOSPITAL | Age: 80
End: 2025-06-23
Payer: MEDICARE

## 2025-06-23 DIAGNOSIS — C67.2 MALIGNANT NEOPLASM OF LATERAL WALL OF URINARY BLADDER (HCC): ICD-10-CM

## 2025-06-23 DIAGNOSIS — Z01.818 ENCOUNTER FOR PREADMISSION TESTING: ICD-10-CM

## 2025-06-23 LAB
ANION GAP SERPL CALCULATED.3IONS-SCNC: 12 MMOL/L (ref 4–13)
BASOPHILS # BLD AUTO: 0.02 THOUSANDS/ÂΜL (ref 0–0.1)
BASOPHILS NFR BLD AUTO: 0 % (ref 0–1)
BUN SERPL-MCNC: 13 MG/DL (ref 5–25)
CALCIUM SERPL-MCNC: 9.4 MG/DL (ref 8.4–10.2)
CHLORIDE SERPL-SCNC: 100 MMOL/L (ref 96–108)
CO2 SERPL-SCNC: 27 MMOL/L (ref 21–32)
CREAT SERPL-MCNC: 0.82 MG/DL (ref 0.6–1.3)
EOSINOPHIL # BLD AUTO: 0.26 THOUSAND/ÂΜL (ref 0–0.61)
EOSINOPHIL NFR BLD AUTO: 3 % (ref 0–6)
ERYTHROCYTE [DISTWIDTH] IN BLOOD BY AUTOMATED COUNT: 12.6 % (ref 11.6–15.1)
GFR SERPL CREATININE-BSD FRML MDRD: 84 ML/MIN/1.73SQ M
GLUCOSE P FAST SERPL-MCNC: 134 MG/DL (ref 65–99)
HCT VFR BLD AUTO: 45.5 % (ref 36.5–49.3)
HGB BLD-MCNC: 15 G/DL (ref 12–17)
IMM GRANULOCYTES # BLD AUTO: 0.01 THOUSAND/UL (ref 0–0.2)
IMM GRANULOCYTES NFR BLD AUTO: 0 % (ref 0–2)
LYMPHOCYTES # BLD AUTO: 2.68 THOUSANDS/ÂΜL (ref 0.6–4.47)
LYMPHOCYTES NFR BLD AUTO: 33 % (ref 14–44)
MCH RBC QN AUTO: 28.1 PG (ref 26.8–34.3)
MCHC RBC AUTO-ENTMCNC: 33 G/DL (ref 31.4–37.4)
MCV RBC AUTO: 85 FL (ref 82–98)
MONOCYTES # BLD AUTO: 0.93 THOUSAND/ÂΜL (ref 0.17–1.22)
MONOCYTES NFR BLD AUTO: 12 % (ref 4–12)
NEUTROPHILS # BLD AUTO: 4.14 THOUSANDS/ÂΜL (ref 1.85–7.62)
NEUTS SEG NFR BLD AUTO: 52 % (ref 43–75)
NRBC BLD AUTO-RTO: 0 /100 WBCS
PLATELET # BLD AUTO: 171 THOUSANDS/UL (ref 149–390)
PMV BLD AUTO: 10 FL (ref 8.9–12.7)
POTASSIUM SERPL-SCNC: 3.1 MMOL/L (ref 3.5–5.3)
QRS AXIS: 21 DEGREES
QRSD INTERVAL: 102 MS
QT INTERVAL: 416 MS
QTC INTERVAL: 501 MS
RBC # BLD AUTO: 5.33 MILLION/UL (ref 3.88–5.62)
SODIUM SERPL-SCNC: 139 MMOL/L (ref 135–147)
T WAVE AXIS: -10 DEGREES
VENTRICULAR RATE: 87 BPM
WBC # BLD AUTO: 8.04 THOUSAND/UL (ref 4.31–10.16)

## 2025-06-23 PROCEDURE — 93005 ELECTROCARDIOGRAM TRACING: CPT

## 2025-06-23 PROCEDURE — 36415 COLL VENOUS BLD VENIPUNCTURE: CPT

## 2025-06-23 PROCEDURE — 85025 COMPLETE CBC W/AUTO DIFF WBC: CPT

## 2025-06-23 PROCEDURE — 80048 BASIC METABOLIC PNL TOTAL CA: CPT

## 2025-06-23 PROCEDURE — 93010 ELECTROCARDIOGRAM REPORT: CPT | Performed by: INTERNAL MEDICINE

## 2025-06-24 DIAGNOSIS — M19.90 ARTHRITIS: ICD-10-CM

## 2025-06-24 DIAGNOSIS — I35.9 NONRHEUMATIC AORTIC VALVE DISORDER: ICD-10-CM

## 2025-06-24 DIAGNOSIS — I48.0 PAF (PAROXYSMAL ATRIAL FIBRILLATION) (HCC): ICD-10-CM

## 2025-06-24 DIAGNOSIS — K21.9 GASTROESOPHAGEAL REFLUX DISEASE: ICD-10-CM

## 2025-06-24 DIAGNOSIS — Z95.2 S/P TAVR (TRANSCATHETER AORTIC VALVE REPLACEMENT): ICD-10-CM

## 2025-06-24 LAB — BACTERIA UR CULT: NORMAL

## 2025-06-25 RX ORDER — FOLIC ACID 1 MG/1
1000 TABLET ORAL DAILY
Qty: 30 TABLET | Refills: 4 | Status: SHIPPED | OUTPATIENT
Start: 2025-06-25

## 2025-06-25 RX ORDER — OMEPRAZOLE 20 MG/1
20 CAPSULE, DELAYED RELEASE ORAL DAILY
Qty: 30 CAPSULE | Refills: 4 | Status: SHIPPED | OUTPATIENT
Start: 2025-06-25

## 2025-06-25 RX ORDER — APIXABAN 5 MG/1
5 TABLET, FILM COATED ORAL 2 TIMES DAILY
Qty: 60 TABLET | Refills: 5 | Status: SHIPPED | OUTPATIENT
Start: 2025-06-25

## 2025-06-25 RX ORDER — ASPIRIN 81 MG
81 TABLET,CHEWABLE ORAL DAILY
Qty: 30 TABLET | Refills: 4 | Status: SHIPPED | OUTPATIENT
Start: 2025-06-25

## 2025-07-08 NOTE — H&P
Jose Jimenez MD  Physician  Specialty: Urology     H&P   updated by me 2025-no changes  Signed     Encounter Date: 6/3/2025     Signed         UROLOGY PROGRESS NOTE   [facility]  [address] [place] [address]  [address]  [place] [address]  [phone]  Fax:[phone]  [email or web address]        NAME: Nasim Chen Sr.  AGE: 80 y.o. SEX: male  : 1945            MRN: [id number]     DATE: 6/3/2025  TIME: 2:44 PM     Assessment and Plan:     Bladder cancer, 4 cm tumor left lateral wall-plan TURBT under general anesthesia with paralysis at [facility] in the near future.  With mitomycin.  The procedure as well as the risk of bleeding infection bladder perforation and need for additional procedures been explained and gives informed consent.  We will schedule this.  Severely anxious about this-Valium 10 mg take 1 every 6 hours as needed called in.     1. Gross hematuria  -     Cystoscopy  -     POCT urine dip  2. Bladder mass  -     Cystoscopy  -     POCT urine dip  3. Malignant neoplasm of lateral wall of urinary bladder (HCC)  -     Case request operating room: TRANSURETHRAL RESECTION OF BLADDER TUMOR (TURBT); Standing  -     EKG 12 lead; Future  -     Case request operating room: TRANSURETHRAL RESECTION OF BLADDER TUMOR (TURBT)  -     diazepam (VALIUM) 10 mg tablet; Take 1 tablet (10 mg total) by mouth every 6 (six) hours as needed for anxiety Take 1 hour prior to biopsy  4. Anxiety about health  -     diazepam (VALIUM) 10 mg tablet; Take 1 tablet (10 mg total) by mouth every 6 (six) hours as needed for anxiety Take 1 hour prior to biopsy                        Chief Complaint   No chief complaint on file.        History of Present Illness   80-year-old man, established patient with new to me-seen by  Khloe 2022 for bilateral hydroceles.  He experienced bright red blood in the urine for about 2 weeks prior to May 23, 2025 when he was seen in the office by   Betty.  An ultrasound of the bladder was ordered which showed a 4.1 cm intravesical nodule concerning for bladder cancer.  Here for cystoscopy for this.  Previous upper tract imaging was a CT scan of the abdomen pelvis March 2, 2023 which showed a normal bladder and normal kidneys.  He is on Eliquis and aspirin.  Urine culture that day was negative.  Urinalysis with microscopy showed innumerable red blood cells, 10-20 white blood cells and no bacteria.          Cystoscopy      Date/Time  6/3/2025 2:00 PM      Performed by  Jose Jimenez MD   Authorized by  Jose Jimenez MD        Universal Protocol:  Consent: Verbal consent obtained. Written consent obtained        Procedure Details:  Procedure type: cystoscopy    Additional Procedure Details: Cystoscopy Procedure Note           Pre-operative Diagnosis: Gross hematuria, bladder mass     Post-operative Diagnosis: Bladder cancer, 4 cm tumor left lateral wall     Procedure: Flexible cystoscopy     Surgeon: Jose Jimenez MD     Anesthesia: 1% Xylocaine per urethra     EBL: Minimal     Complications: none     Procedure Details   The risks, benefits, complications, treatment options, and expected outcomes were discussed with the patient. The patient concurred with the proposed plan, giving informed consent.     Cystoscopy was performed today under local anesthesia, using sterile technique. The patient was placed in the supine position, prepped with Betadine, and draped in the usual sterile fashion. The flexible cystocope was used to inspect both the urethra and bladder     Findings:  Urethra: Normal without stricture.  Prostate unremarkable.     Bladder: Limited view due to tumor, but has bladder tumor left lateral wall.           Specimens: None                 Complications:  None           Disposition: To home            Condition:  Stable                 The following portions of the patient's history were reviewed and updated as appropriate: allergies, current  medications, past family history, past medical history, past social history, past surgical history and problem list.  [Past Medical History]    [Past Medical History]       Diagnosis Date    Atrial fibrillation (HCC)      Colon polyp      Hyperlipidemia      Hypertension      Nonocclusive coronary atherosclerosis of native coronary artery 03/26/2015     [Past Surgical History]    [Past Surgical History]        Procedure Laterality Date    APPENDECTOMY        BACK SURGERY   1996     disc repair    CARDIAC CATHETERIZATION         50% lesion of mid LAD, 40% lesion of first obtuse marginal lesion. Last assessed 1/17/2018     CARDIAC CATHETERIZATION N/A 3/13/2023     Procedure: Cardiac catheterization;  Surgeon: Alexandria Eugene DO;  Location: [facility];  Service: Cardiology    CARDIAC CATHETERIZATION N/A 3/13/2023     Procedure: Cardiac Coronary Angiogram;  Surgeon: Alexandria Eugene DO;  Location: [facility];  Service: Cardiology    CARDIAC CATHETERIZATION N/A 4/18/2023     Procedure: Cardiac tavr;  Surgeon: James Tavera DO;  Location:  MAIN OR;  Service: Cardiology    CHOLECYSTECTOMY        COLON SURGERY         colon ressection    COLONOSCOPY N/A 01/14/2019     Procedure: COLONOSCOPY;  Surgeon: MILAGRO Bernardo MD;  Location: [facility];  Service: Colorectal    COLONOSCOPY   08/08/2022    CYSTOSCOPY         Diagnostic     HEMORRHOID SURGERY   08/2011    LA REPLACE AORTIC VALVE OPENFEMORAL ARTERY APPROACH N/A 4/18/2023     Procedure: REPLACEMENT AORTIC VALVE TRANSCATHETER (TAVR) TRANSFEMORAL W/ 26MM BELCHER RAMONA S3 ULTRA VALVE(ACCESS ON RIGHT) AUREA;  Surgeon: MARTIN Hussein MD;  Location: [facility];  Service: Cardiac Surgery    TONSILLECTOMY         shoulder  Review of Systems   Review of Systems     Active Problem List   [Problem List]    [Problem List]  Patient Active Problem List      Diagnosis    Severe aortic stenosis    Chronic diastolic CHF (congestive heart failure)  "(HCC)    Nonocclusive coronary atherosclerosis of native coronary artery    Dyslipidemia    Primary hypertension    Obstructive sleep apnea of adult    Longstanding persistent atrial fibrillation (HCC)    Paroxysmal atrial tachycardia (HCC)    Erectile dysfunction    GERD without esophagitis    Renovascular hypertension    Rheumatoid arthritis (HCC)    History of colon polyps    Mixed hyperlipidemia    Hypertensive heart disease with congestive heart failure (HCC)    Daily consumption of alcohol    S/P TAVR (transcatheter aortic valve replacement)    Encounter for postoperative care    Type 2 diabetes mellitus with hyperosmolarity without coma, without long-term current use of insulin (HCC)    Type 2 diabetes mellitus with hyperglycemia, without long-term current use of insulin (HCC)        Objective   /82   Pulse 94   Temp 98 °F (36.7 °C)   Ht 5' 9\" (1.753 m)   Wt 114 kg (250 lb 9.6 oz)   SpO2 97%   BMI 37.01 kg/m²      Physical Exam  Vitals reviewed.   Constitutional:       Appearance: Normal appearance.   HENT:      Head: Normocephalic and atraumatic.      Eyes:      General: Scleral icterus: valium.      Extraocular Movements: Extraocular movements intact.         Cardiovascular:      Rate and Rhythm: Tachycardia present.      Heart sounds: Normal heart sounds.   Pulmonary:      Effort: Pulmonary effort is normal.      Breath sounds: Normal breath sounds.   Genitourinary:     Penis: Normal.       Musculoskeletal:         General: Normal range of motion.      Cervical back: Normal range of motion.      Skin:     General: Skin is warm and dry.      Coloration: Skin is not jaundiced or pale.      Neurological:      General: No focal deficit present.      Mental Status: He is alert and oriented to person, place, and time.      Psychiatric:         Mood and Affect: Mood normal.         Behavior: Behavior normal.         Thought Content: Thought content normal.         Judgment: Judgment normal.          "         Current Medications   [Current Medications]    [Current Medications]     Current Outpatient Medications:     apixaban (Eliquis) 5 mg, TAKE ONE TABLET BY MOUTH TWICE A DAY., Disp: 60 tablet, Rfl: 5    aspirin (Aspirin Low Dose) 81 mg chewable tablet, Chew 1 tablet (81 mg total) daily., Disp: 30 tablet, Rfl: 5    diazepam (VALIUM) 10 mg tablet, Take 1 tablet (10 mg total) by mouth every 6 (six) hours as needed for anxiety Take 1 hour prior to biopsy, Disp: 20 tablet, Rfl: 0    ezetimibe (ZETIA) 10 mg tablet, Take 1 tablet by mouth daily, Disp: 30 tablet, Rfl: 4    folic acid (FOLVITE) 1 mg tablet, TAKE (1) TABLET BY MOUTH DAILY., Disp: 30 tablet, Rfl: 5    furosemide (LASIX) 40 mg tablet, TAKE ONE TABLET BY MOUTH TWICE A DAY., Disp: 60 tablet, Rfl: 4    lisinopril (ZESTRIL) 20 mg tablet, TAKE (1) TABLET BY MOUTH TWICE DAILY., Disp: 60 tablet, Rfl: 4    losartan (COZAAR) 100 MG tablet, TAKE (1) TABLET BY MOUTH DAILY., Disp: 30 tablet, Rfl: 4    metoprolol tartrate (LOPRESSOR) 50 mg tablet, Take 1 tablet (50 mg total) by mouth every 12 (twelve) hours, Disp: 60 tablet, Rfl: 4    NIFEdipine (PROCARDIA XL) 90 mg 24 hr tablet, TAKE 1 TABLET DAILY., Disp: 30 tablet, Rfl: 4    omeprazole (PriLOSEC) 20 mg delayed release capsule, TAKE (1) CAPSULE DAILY., Disp: 30 capsule, Rfl: 5    potassium chloride (Klor-Con M20) 20 mEq tablet, Take 1 tablet (20 mEq total) by mouth 2 (two) times a day, Disp: 60 tablet, Rfl: 3    predniSONE 5 mg tablet, TAKE ONE TABLET DAILY., Disp: 30 tablet, Rfl: 5    rosuvastatin (CRESTOR) 20 MG tablet, TAKE ONE TABLET BY MOUTH DAILY., Disp: 30 tablet, Rfl: 4    Blood Glucose Monitoring Suppl (OneTouch Verio Reflect) w/Device KIT, Check blood sugars once daily. Please substitute with appropriate alternative as covered by patient's insurance. Dx: E11.65 (Patient not taking: Reported on 6/3/2025), Disp: 1 kit, Rfl: 0    glucose blood (OneTouch Verio) test strip, Check blood sugars once daily. Please  "substitute with appropriate alternative as covered by patient's insurance. Dx: E11.65 (Patient not taking: Reported on 6/3/2025), Disp: 100 each, Rfl: 3    OneTouch Delica Lancets 33G MISC, Check blood sugars once daily. Please substitute with appropriate alternative as covered by patient's insurance. Dx: E11.65 (Patient not taking: Reported on 6/3/2025), Disp: 100 each, Rfl: 3           Jose Jimenez MD                        Electronically signed by Jose Jimenez MD at 6/3/2025  2:49 PM         Office Visit on 6/3/2025          Note viewed by patient  Additional Documentation    Vitals: /82     Pulse 94     Temp 98 °F (36.7 °C)     Ht 5' 9\" (1.753 m)     Wt 114 kg (250 lb 9.6 oz)     SpO2 97%     BMI 37.01 kg/m²     BSA 2.28 m²          More Vitals   Flowsheets: Patient-Reported Data   SmartForms:  [facility] PCMH/PCSP WRAP UP REQUIREMENTS ADVANCED       AMB [facility] PRE-CHARTING   Encounter Info: Billing Info,     History,     Allergies,     Detailed Report     Orders Placed      POCT urine dip    Cystoscopy    Case request operating room: TRANSURETHRAL RESECTION OF BLADDER TUMOR (TURBT) Once    EKG 12 lead  All Encounter Results  Other Orders Performed    Ambulatory Referral to Urology Pending Review  Medication Changes      diazePAM 10 mg Oral Every 6 hours PRN, Take 1 hour prior to biopsy    Cephalexin      500 mg Oral Every 12 hours scheduled    500 mg Oral Once, Take after procedure  Medication List  Visit Diagnoses      Gross hematuria    Bladder mass    Malignant neoplasm of lateral wall of urinary bladder (HCC)    Anxiety about health  Problem List  "

## 2025-07-09 ENCOUNTER — ANESTHESIA EVENT (OUTPATIENT)
Dept: PERIOP | Facility: HOSPITAL | Age: 80
End: 2025-07-09
Payer: MEDICARE

## 2025-07-09 ENCOUNTER — HOSPITAL ENCOUNTER (OUTPATIENT)
Facility: HOSPITAL | Age: 80
Setting detail: OUTPATIENT SURGERY
Discharge: HOME | End: 2025-07-09
Attending: UROLOGY | Admitting: UROLOGY
Payer: MEDICARE

## 2025-07-09 ENCOUNTER — ANESTHESIA (OUTPATIENT)
Dept: PERIOP | Facility: HOSPITAL | Age: 80
End: 2025-07-09
Payer: MEDICARE

## 2025-07-09 VITALS
HEART RATE: 94 BPM | HEIGHT: 69 IN | BODY MASS INDEX: 37.03 KG/M2 | SYSTOLIC BLOOD PRESSURE: 148 MMHG | OXYGEN SATURATION: 96 % | OXYGEN SATURATION: 96 % | RESPIRATION RATE: 18 BRPM | HEIGHT: 69 IN | RESPIRATION RATE: 18 BRPM | DIASTOLIC BLOOD PRESSURE: 84 MMHG | WEIGHT: 250 LBS | TEMPERATURE: 98.4 F | HEART RATE: 94 BPM | BODY MASS INDEX: 37.03 KG/M2 | SYSTOLIC BLOOD PRESSURE: 148 MMHG | DIASTOLIC BLOOD PRESSURE: 84 MMHG | TEMPERATURE: 98.4 F | WEIGHT: 250 LBS

## 2025-07-09 DIAGNOSIS — C67.2 MALIGNANT NEOPLASM OF LATERAL WALL OF URINARY BLADDER (HCC): ICD-10-CM

## 2025-07-09 LAB
GLUCOSE SERPL-MCNC: 140 MG/DL (ref 65–140)
GLUCOSE SERPL-MCNC: 140 MG/DL (ref 65–140)

## 2025-07-09 PROCEDURE — NC001 PR NO CHARGE: Performed by: UROLOGY

## 2025-07-09 PROCEDURE — 82948 REAGENT STRIP/BLOOD GLUCOSE: CPT

## 2025-07-09 PROCEDURE — 52240 CYSTOSCOPY AND TREATMENT: CPT | Performed by: UROLOGY

## 2025-07-09 PROCEDURE — 88307 TISSUE EXAM BY PATHOLOGIST: CPT | Performed by: STUDENT IN AN ORGANIZED HEALTH CARE EDUCATION/TRAINING PROGRAM

## 2025-07-09 RX ORDER — FENTANYL CITRATE/PF 50 MCG/ML
25 SYRINGE (ML) INJECTION
Status: DISCONTINUED | OUTPATIENT
Start: 2025-07-09 | End: 2025-07-09 | Stop reason: HOSPADM

## 2025-07-09 RX ORDER — PROPOFOL 10 MG/ML
INJECTION, EMULSION INTRAVENOUS AS NEEDED
Status: DISCONTINUED | OUTPATIENT
Start: 2025-07-09 | End: 2025-07-09

## 2025-07-09 RX ORDER — CEPHALEXIN 500 MG/1
500 CAPSULE ORAL EVERY 6 HOURS SCHEDULED
Qty: 12 CAPSULE | Refills: 0 | Status: SHIPPED | OUTPATIENT
Start: 2025-07-09 | End: 2025-07-12

## 2025-07-09 RX ORDER — ONDANSETRON 2 MG/ML
INJECTION INTRAMUSCULAR; INTRAVENOUS AS NEEDED
Status: DISCONTINUED | OUTPATIENT
Start: 2025-07-09 | End: 2025-07-09

## 2025-07-09 RX ORDER — LIDOCAINE HCL/PF 100 MG/5ML
SYRINGE (ML) INJECTION AS NEEDED
Status: DISCONTINUED | OUTPATIENT
Start: 2025-07-09 | End: 2025-07-09

## 2025-07-09 RX ORDER — HYDROCODONE BITARTRATE AND ACETAMINOPHEN 5; 325 MG/1; MG/1
1 TABLET ORAL EVERY 6 HOURS PRN
Status: DISCONTINUED | OUTPATIENT
Start: 2025-07-09 | End: 2025-07-09 | Stop reason: HOSPADM

## 2025-07-09 RX ORDER — ONDANSETRON 2 MG/ML
4 INJECTION INTRAMUSCULAR; INTRAVENOUS ONCE AS NEEDED
Status: DISCONTINUED | OUTPATIENT
Start: 2025-07-09 | End: 2025-07-09 | Stop reason: HOSPADM

## 2025-07-09 RX ORDER — FENTANYL CITRATE 50 UG/ML
INJECTION, SOLUTION INTRAMUSCULAR; INTRAVENOUS AS NEEDED
Status: DISCONTINUED | OUTPATIENT
Start: 2025-07-09 | End: 2025-07-09

## 2025-07-09 RX ORDER — CEFAZOLIN SODIUM 2 G/50ML
2000 SOLUTION INTRAVENOUS ONCE
Status: COMPLETED | OUTPATIENT
Start: 2025-07-09 | End: 2025-07-09

## 2025-07-09 RX ORDER — PHENAZOPYRIDINE HYDROCHLORIDE 100 MG/1
200 TABLET, FILM COATED ORAL ONCE
Status: COMPLETED | OUTPATIENT
Start: 2025-07-09 | End: 2025-07-09

## 2025-07-09 RX ORDER — HYDROCODONE BITARTRATE AND ACETAMINOPHEN 5; 325 MG/1; MG/1
1-2 TABLET ORAL EVERY 6 HOURS PRN
Qty: 5 TABLET | Refills: 0 | Status: SHIPPED | OUTPATIENT
Start: 2025-07-09

## 2025-07-09 RX ORDER — SODIUM CHLORIDE, SODIUM LACTATE, POTASSIUM CHLORIDE, CALCIUM CHLORIDE 600; 310; 30; 20 MG/100ML; MG/100ML; MG/100ML; MG/100ML
20 INJECTION, SOLUTION INTRAVENOUS CONTINUOUS
Status: CANCELLED | OUTPATIENT
Start: 2025-07-09

## 2025-07-09 RX ORDER — PHENAZOPYRIDINE HYDROCHLORIDE 200 MG/1
200 TABLET, FILM COATED ORAL 3 TIMES DAILY PRN
Qty: 30 TABLET | Refills: 0 | Status: SHIPPED | OUTPATIENT
Start: 2025-07-09

## 2025-07-09 RX ORDER — SODIUM CHLORIDE, SODIUM LACTATE, POTASSIUM CHLORIDE, CALCIUM CHLORIDE 600; 310; 30; 20 MG/100ML; MG/100ML; MG/100ML; MG/100ML
INJECTION, SOLUTION INTRAVENOUS CONTINUOUS PRN
Status: DISCONTINUED | OUTPATIENT
Start: 2025-07-09 | End: 2025-07-09

## 2025-07-09 RX ORDER — ROCURONIUM BROMIDE 10 MG/ML
INJECTION, SOLUTION INTRAVENOUS AS NEEDED
Status: DISCONTINUED | OUTPATIENT
Start: 2025-07-09 | End: 2025-07-09

## 2025-07-09 RX ORDER — SORBITOL 30 G/1000ML
IRRIGANT IRRIGATION AS NEEDED
Status: DISCONTINUED | OUTPATIENT
Start: 2025-07-09 | End: 2025-07-09 | Stop reason: HOSPADM

## 2025-07-09 RX ADMIN — ROCURONIUM BROMIDE 30 MG: 10 INJECTION, SOLUTION INTRAVENOUS at 07:45

## 2025-07-09 RX ADMIN — ONDANSETRON 4 MG: 2 INJECTION INTRAMUSCULAR; INTRAVENOUS at 07:34

## 2025-07-09 RX ADMIN — SUGAMMADEX 200 MG: 100 INJECTION, SOLUTION INTRAVENOUS at 08:19

## 2025-07-09 RX ADMIN — PROPOFOL 150 MG: 10 INJECTION, EMULSION INTRAVENOUS at 07:34

## 2025-07-09 RX ADMIN — ROCURONIUM BROMIDE 20 MG: 10 INJECTION, SOLUTION INTRAVENOUS at 07:58

## 2025-07-09 RX ADMIN — SODIUM CHLORIDE, SODIUM LACTATE, POTASSIUM CHLORIDE, AND CALCIUM CHLORIDE: .6; .31; .03; .02 INJECTION, SOLUTION INTRAVENOUS at 07:28

## 2025-07-09 RX ADMIN — FENTANYL CITRATE 25 MCG: 50 INJECTION, SOLUTION INTRAMUSCULAR; INTRAVENOUS at 07:38

## 2025-07-09 RX ADMIN — PROPOFOL 50 MG: 10 INJECTION, EMULSION INTRAVENOUS at 07:45

## 2025-07-09 RX ADMIN — LIDOCAINE HYDROCHLORIDE 60 MG: 20 INJECTION, SOLUTION INTRAVENOUS at 07:34

## 2025-07-09 RX ADMIN — CEFAZOLIN SODIUM 2000 MG: 2 SOLUTION INTRAVENOUS at 07:35

## 2025-07-09 RX ADMIN — PHENAZOPYRIDINE HYDROCHLORIDE 200 MG: 100 TABLET ORAL at 08:41

## 2025-07-09 NOTE — ANESTHESIA POSTPROCEDURE EVALUATION
Post-Op Assessment Note    CV Status:  Stable    Pain management: adequate       Mental Status:  Alert and awake   Hydration Status:  Euvolemic   PONV Controlled:  Controlled   Airway Patency:  Patent     Post Op Vitals Reviewed: Yes    No anethesia notable event occurred.    Staff: Anesthesiologist         Last Filed PACU Vitals:  Vitals Value Taken Time   Temp 97.7 °F (36.5 °C) 07/09/25 09:01   Pulse 82 07/09/25 09:10   /86 07/09/25 09:07   Resp 13 07/09/25 09:10   SpO2 94 % 07/09/25 09:10   Vitals shown include unfiled device data.    Modified Partha:     Vitals Value Taken Time   Activity 2 07/09/25 09:01   Respiration 2 07/09/25 09:01   Circulation 2 07/09/25 09:01   Consciousness 2 07/09/25 09:01   Oxygen Saturation 2 07/09/25 09:01     Modified Partha Score: 10

## 2025-07-09 NOTE — INTERVAL H&P NOTE
H&P reviewed. After examining the patient I find no changes in the patients condition since the H&P had been written.    Vitals:    07/09/25 0655   BP: 155/78   Pulse: 74   Resp: 20   Temp: 97.9 °F (36.6 °C)   SpO2: 97%

## 2025-07-09 NOTE — PERIOPERATIVE NURSING NOTE
Spoke with KANU Napier regarding Blood sugar in PACU.   He stated that it did not need to be done.

## 2025-07-09 NOTE — ANESTHESIA PREPROCEDURE EVALUATION
Procedure:  TRANSURETHRAL RESECTION OF BLADDER TUMOR (TURBT) (Bladder)  INSTILLATION MITOMYCIN (Bladder)    Relevant Problems   ANESTHESIA (within normal limits)      CARDIO   (+) Hypertensive heart disease with congestive heart failure (HCC)   (+) Longstanding persistent atrial fibrillation (HCC)   (+) Mixed hyperlipidemia   (+) Nonocclusive coronary atherosclerosis of native coronary artery   (+) Paroxysmal atrial tachycardia (HCC)   (+) Primary hypertension   (+) Renovascular hypertension   (+) Severe aortic stenosis   (-) Angina at rest (HCC)   (-) Angina of effort (HCC)   (-) BUSTAMANTE (dyspnea on exertion)      ENDO   (+) Type 2 diabetes mellitus with hyperglycemia, without long-term current use of insulin (HCC)   (+) Type 2 diabetes mellitus with hyperosmolarity without coma, without long-term current use of insulin (HCC)      GI/HEPATIC   (+) GERD without esophagitis (Well controlled)      /RENAL (within normal limits)      HEMATOLOGY (within normal limits)      MUSCULOSKELETAL   (+) Rheumatoid arthritis (HCC)      NEURO/PSYCH (within normal limits)      PULMONARY   (+) Obstructive sleep apnea of adult   (-) URI (upper respiratory infection)    4/2024 TTE  History    Transcatheter aortic valve replacement (TAVR): 26 mm Galvan Allison 3 ultra bioprosthesis, congestive heart failure, coronary artery disease, paroxysmal atrial fibrillation, paroxysmal atrial tachycardia.     Interpretation Summary         Left Ventricle: Left ventricular cavity size is normal. Wall thickness is mildly increased. There is concentric remodeling. The left ventricular ejection fraction is 65%. Systolic function is normal. Although no diagnostic regional wall motion abnormality was identified, this possibility cannot be completely excluded on the basis of this study. Unable to assess diastolic function due to atrial fibrillation.    Right Ventricle: Right ventricular cavity size is mildly dilated. Systolic function is normal.    Left  Atrium: The atrium is severely dilated (>48 mL/m2).    Right Atrium: The atrium is mildly dilated.    Aortic Valve: There is an Galvan RAMONA 3 Ultra 26 mm TAVR bioprosthetic valve. There is no evidence of paravalvular regurgitation. There is no evidence of transvalvular regurgitation. The gradient recorded across the prosthetic aortic valve is within the expected range. The aortic valve mean gradient is 8 mmHg. The dimensionless velocity index is 0.64. The aortic valve area is 1.79 cm2.    Mitral Valve: There is moderate annular calcification. There is mild regurgitation.    Tricuspid Valve: There is mild regurgitation.    Pulmonic Valve: There is mild regurgitation.     Lab Results   Component Value Date    WBC 8.04 06/23/2025    HGB 15.0 06/23/2025    HCT 45.5 06/23/2025    MCV 85 06/23/2025     06/23/2025     Lab Results   Component Value Date     12/31/2015    SODIUM 139 06/23/2025    K 3.1 (L) 06/23/2025     06/23/2025    CO2 27 06/23/2025    ANIONGAP 10 12/31/2015    AGAP 12 06/23/2025    BUN 13 06/23/2025    CREATININE 0.82 06/23/2025    GLUC 136 04/19/2023    GLUF 134 (H) 06/23/2025    CALCIUM 9.4 06/23/2025    AST 36 05/23/2025    ALT 33 05/23/2025    ALKPHOS 51 05/23/2025    PROT 7.4 12/31/2015    TP 7.1 05/23/2025    BILITOT 1.07 (H) 12/31/2015    TBILI 1.37 (H) 05/23/2025    EGFR 84 06/23/2025         Physical Exam    Airway     Mallampati score: III  TM Distance: >3 FB  Neck ROM: full  Mouth opening: >= 4 cm      Cardiovascular  Rhythm: irregular, Rate: normal, Murmur (1/6 EMILY)    Dental    poor dentition,     Pulmonary   Breath sounds clear to auscultation    Neurological    He appears awake, alert and oriented x3.      Other Findings        Anesthesia Plan  ASA Score- 3     Anesthesia Type- general with ASA Monitors.         Additional Monitors:     Airway Plan: LMA, Oral ETT and LMA.           Plan Factors-Exercise tolerance (METS): >4 METS.    Chart reviewed.    Patient summary  reviewed.                  Induction- intravenous.    Postoperative Plan- Plan for postoperative opioid use.   Monitoring Plan - Monitoring plan - standard ASA monitoring  Post Operative Pain Plan - plan for postoperative opioid use and multimodal analgesia    Perioperative Resuscitation Plan - Level 1 - Full Code.       Informed Consent- Anesthetic plan and risks discussed with patient.  I personally reviewed this patient with the CRNA. Discussed and agreed on the Anesthesia Plan with the CRNA..      NPO Status:  No vitals data found for the desired time range.

## 2025-07-09 NOTE — ANESTHESIA POSTPROCEDURE EVALUATION
Post-Op Assessment Note    CV Status:  Stable  Pain Score: 0    Pain management: adequate       Mental Status:  Alert and awake   Hydration Status:  Euvolemic   PONV Controlled:  Controlled   Airway Patency:  Patent     Post Op Vitals Reviewed: Yes    No anethesia notable event occurred.    Staff: CRNA           Last Filed PACU Vitals:  Vitals Value Taken Time   Temp 98    Pulse 80 07/09/25 08:29   /76    Resp 19 07/09/25 08:29   SpO2 99 % 07/09/25 08:29   Vitals shown include unfiled device data.

## 2025-07-09 NOTE — OP NOTE
OPERATIVE REPORT  PATIENT NAME: Nasim Chen Sr.    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 (HCC) [C67.2]  Bladder cancer, papillary and sessile involving entire left side of bladder-dome, lateral wall, left trigone including ureteral orifice with 5 to 7 cm of tumor and a 1 cm lesion central trigone      Procedure(s):  TRANSURETHRAL RESECTION OF BLADDER TUMOR (TURBT) large  INSTILLATION MITOMYCIN    Specimen(s):  ID Type Source Tests Collected by Time Destination   1 : bladder tumor chips Tissue Urinary Bladder TISSUE EXAM Jose Jimenez MD 2025 [id number]        Estimated Blood Loss:   Minimal    Drains:  * No LDAs found *    Anesthesia Type:   General    Operative Indications:  Malignant neoplasm of lateral wall of urinary bladder (HCC) [C67.2]  4 cm left lateral wall tumor    Operative Findings:  Right ureteral orifice and right trigone, right side of the bladder not involved.  No prostatic urethral involvement.  Extensive tumor going from superior left lateral wall down involving the left trigone and a 1 cm central trigonal lesion.  Left ureteral orifice not seen during the procedure.  This was covered with tumor.  Automatic relook with biopsies in 6 to 8 weeks.       Complications:   None    Procedure and Technique:         Nasim Chen Sr. has been scheduled for the above procedure . The indications for the procedure are a lesion that appears malignant that was seen on cystoscopy.. The risks of bleeding, infection, damage to the urinary tract and adjacent organs were discussed with the patient and informed consent was given.       The patient was brought to the operating room and identified. After MAC anesthesia was induced, the patient  was prepared and draped in the dorsolithotomy position in the usual fashion, with standard care taken to protect pressure points. A time out was performed. The 26 Hungarian resectoscope sheath and resectoscope with 30 degree lens was used to perform the procedure. After entering the bladder, a tumor consistent with bladder cancer was noted. There were as described above. The resectoscope loop was used to transurethrally resect the tumor down into the muscle, and the base and margins were fulgurated. The specimen was evacuated and sent to pathology. After hemostasis was excellent, the bladder was drained, a 16 Vietnamese Vega catheter was placed and 40 mg of mitomycin and 40 cc was instilled into the bladder.  This was clamped.   the patient was awakened and transferred to PACU in good condition.    I was present for the entire procedure. and A qualified resident physician was not available.    Patient Disposition:  PACU  and extubated and stable         SIGNATURE: Jose Jimenez MD  DATE: July 9, 2025  TIME: 8:41 AM

## 2025-07-10 ENCOUNTER — HOSPITAL ENCOUNTER (EMERGENCY)
Facility: HOSPITAL | Age: 80
End: 2025-07-10
Attending: EMERGENCY MEDICINE
Payer: MEDICARE

## 2025-07-10 ENCOUNTER — TELEPHONE (OUTPATIENT)
Age: 80
End: 2025-07-10

## 2025-07-10 DIAGNOSIS — E87.5 HYPERKALEMIA: ICD-10-CM

## 2025-07-10 DIAGNOSIS — I46.9 CARDIAC ARREST (HCC): Primary | ICD-10-CM

## 2025-07-10 LAB
ATRIAL RATE: 104 BPM
BASE EXCESS BLDA CALC-SCNC: -28 MMOL/L (ref -2–3)
CA-I BLD-SCNC: 0.44 MMOL/L (ref 1.12–1.32)
GLUCOSE SERPL-MCNC: 512 MG/DL (ref 65–140)
HCO3 BLDA-SCNC: 8.9 MMOL/L (ref 24–30)
HCT VFR BLD CALC: 44 % (ref 36.5–49.3)
HGB BLDA-MCNC: 15 G/DL (ref 12–17)
P AXIS: 234 DEGREES
PCO2 BLD: 11 MMOL/L (ref 21–32)
PCO2 BLD: 67.9 MM HG (ref 42–50)
PH BLD: 6.72 [PH] (ref 7.3–7.4)
PO2 BLD: 48 MM HG (ref 35–45)
POTASSIUM BLD-SCNC: >8 MMOL/L (ref 3.5–5.3)
PR INTERVAL: 132 MS
QRS AXIS: 90 DEGREES
QRSD INTERVAL: 162 MS
QT INTERVAL: 328 MS
QTC INTERVAL: 453 MS
SAO2 % BLD FROM PO2: 43 % (ref 60–85)
SODIUM BLD-SCNC: 137 MMOL/L (ref 136–145)
SPECIMEN SOURCE: ABNORMAL
T WAVE AXIS: 260 DEGREES
VENTRICULAR RATE: 115 BPM

## 2025-07-10 PROCEDURE — 99285 EMERGENCY DEPT VISIT HI MDM: CPT

## 2025-07-10 PROCEDURE — 96374 THER/PROPH/DIAG INJ IV PUSH: CPT

## 2025-07-10 PROCEDURE — 82803 BLOOD GASES ANY COMBINATION: CPT

## 2025-07-10 PROCEDURE — 85014 HEMATOCRIT: CPT

## 2025-07-10 PROCEDURE — 93005 ELECTROCARDIOGRAM TRACING: CPT

## 2025-07-10 PROCEDURE — 92950 HEART/LUNG RESUSCITATION CPR: CPT

## 2025-07-10 PROCEDURE — 93010 ELECTROCARDIOGRAM REPORT: CPT | Performed by: INTERNAL MEDICINE

## 2025-07-10 PROCEDURE — 36430 TRANSFUSION BLD/BLD COMPNT: CPT

## 2025-07-10 PROCEDURE — 99291 CRITICAL CARE FIRST HOUR: CPT | Performed by: EMERGENCY MEDICINE

## 2025-07-10 PROCEDURE — 82947 ASSAY GLUCOSE BLOOD QUANT: CPT

## 2025-07-10 PROCEDURE — 84132 ASSAY OF SERUM POTASSIUM: CPT

## 2025-07-10 PROCEDURE — 84295 ASSAY OF SERUM SODIUM: CPT

## 2025-07-10 PROCEDURE — 96375 TX/PRO/DX INJ NEW DRUG ADDON: CPT

## 2025-07-10 PROCEDURE — 82330 ASSAY OF CALCIUM: CPT

## 2025-07-10 RX ORDER — EPINEPHRINE 0.1 MG/ML
INJECTION INTRAVENOUS CODE/TRAUMA/SEDATION MEDICATION
Status: COMPLETED | OUTPATIENT
Start: 2025-07-10 | End: 2025-07-10

## 2025-07-10 RX ORDER — CALCIUM CHLORIDE 100 MG/ML
SYRINGE (ML) INTRAVENOUS CODE/TRAUMA/SEDATION MEDICATION
Status: COMPLETED | OUTPATIENT
Start: 2025-07-10 | End: 2025-07-10

## 2025-07-10 RX ORDER — SODIUM BICARBONATE 84 MG/ML
INJECTION, SOLUTION INTRAVENOUS CODE/TRAUMA/SEDATION MEDICATION
Status: COMPLETED | OUTPATIENT
Start: 2025-07-10 | End: 2025-07-10

## 2025-07-10 RX ADMIN — EPINEPHRINE 1 MG: 0.1 INJECTION INTRAVENOUS at 08:15

## 2025-07-10 RX ADMIN — EPINEPHRINE 1 MG: 0.1 INJECTION INTRAVENOUS at 08:23

## 2025-07-10 RX ADMIN — EPINEPHRINE 1 MG: 0.1 INJECTION INTRAVENOUS at 08:28

## 2025-07-10 RX ADMIN — CALCIUM CHLORIDE 1 G: 100 INJECTION PARENTERAL at 08:31

## 2025-07-10 RX ADMIN — EPINEPHRINE 1 MG: 0.1 INJECTION INTRAVENOUS at 08:39

## 2025-07-10 RX ADMIN — EPINEPHRINE 1 MG: 0.1 INJECTION INTRAVENOUS at 08:20

## 2025-07-10 RX ADMIN — SODIUM BICARBONATE 100 MEQ: 84 INJECTION, SOLUTION INTRAVENOUS at 08:32

## 2025-07-10 NOTE — CODE DOCUMENTATION
IV access lost for second unit of blood. Attempting another Iv and will continue unit once obtained.

## 2025-07-10 NOTE — QUICK NOTE
I called his wife and offered my condolences.  Patient  in the emergency department earlier today.  He has a history of coronary artery disease and aortic stenosis.  He had no problems intraoperatively with the surgery and his wife says that he was bleeding quite a bit after the surgery at home.  I have no record of myself or my staff being contacted regarding this until I heard from my nurse practitioner that the patient had passed away.  No major blood loss was encouraged during the surgery-also his hematocrit and hemoglobin were normal on the blood gas that he took when he came to the emergency room and cardiac arrest.  He had hyperkalemia but this may be simply due to hemolysis.  My impression is cardiogenic shock probably due to his pre-existing heart disease status post TAVR and atrial fibrillation, but things also could have been aggravated if he was in clot retention with a distended bladder leading to hyper vagal stimulation-kept on his blood thinners precisely because of my concern for the possibility of coronary thrombosis and stroke. again I conveyed my shock and disappointment in the outcome and my condolences to her.   arrangements are being made through her local  home.

## 2025-07-10 NOTE — TELEPHONE ENCOUNTER
Post Op Note    Nasim Chen Sr. is a 80 y.o. male s/p TRANSURETHRAL RESECTION OF BLADDER TUMOR (TURBT) (Bladder)       INSTILLATION MITOMYCIN (Bladder)  performed 7/9/2025.  Nasim Chen Sr. is a patient of  Dr. Jimenez and is seen at the [place] office.     Voicemail left for patient that I was calling to follow up with him s/p procedure yesterday. Advised patient to contact the office with any questions or concerns. Also advised of path review on 7/29/25 @ 10:00 am in [place] with Dr. Jimenez.         No

## 2025-07-10 NOTE — CODE DOCUMENTATION
Patient arrived via EMS as a cardiac arrest. They were dispatched to the house for bleeding/uti symptoms. EMS stated he was sitting on the toilet and diaphoretic and agonally breathing. En route patient went into a witnessed cardiac arrest. Stated they had no shockable rhythm. Epi was attempted to be given but lost IV access. Arrived to us at 0811 on the lucus. Lucus continued. No pulse.

## 2025-07-10 NOTE — ED PROVIDER NOTES
Time reflects when diagnosis was documented in both MDM as applicable and the Disposition within this note       Time User Action Codes Description Comment    7/10/2025  9:20 AM Marielena Parekh [I46.9] Cardiac arrest (HCC)     7/10/2025  9:20 AM Marielena Parekh [E87.5] Hyperkalemia           ED Disposition       ED Disposition       Condition   --    Date/Time   Thu Jul 10, 2025  9:28 AM    Comment   --             Assessment & Plan       Medical Decision Making  Risk  Prescription drug management.      80-year-old male with history of atrial fibrillation, hypertension, hyperlipidemia, CAD, and bladder cancer status post transurethral resection of bladder tumor yesterday presenting as pre hospital cardiac arrest.  Patient was initially noted to be hypotensive and having bleeding from his urinary tract, and went into a witnessed cardiac arrest while en route.  CPR started prehospital.  Upon arrival at the hospital, initial pulse check reveals PEA.  Decision made to give 2 units of uncrossed matched emergent blood secondary to history of bleeding and recent surgery.  Initial FAST exam shows slightly distended bladder but no free fluid in the pelvis or abdomen.  Will obtain i-STAT to evaluate for reversible etiology of cardiac arrest.  istat reveals hemoglobin of 15, potassium of than 8, will treat with calcium and bicarb.  No obvious pericardial effusion, on subsequent pulse checks, patient did go into a wide-complex pulseless V. tach versus V-fib.  He did receive shocks x 4.  Received epinephrine per ACLS protocol.  After approximately 45 minutes of downtime, still without any return of spontaneous circulation, decision made to terminate resuscitative efforts.  Time of death called at 8:48 AM.  I updated patient's family members at bedside with patient's status.    Critical Care Time Statement: Upon my evaluation, this patient had a high probability of imminent or life-threatening deterioration due to  cardiac arrest, which required my direct attention, intervention, and personal management.  I spent a total of 45 minutes directly providing critical care services, including interpretation of complex medical databases, evaluating for the presence of life-threatening injuries or illnesses, management of organ system failure(s) , complex medical decision making (to support/prevent further life-threatening deterioration)., and interpretation of hemodynamic data. This time is exclusive of procedures, teaching, treating other patients, family meetings, and any prior time recorded by providers other than myself.            Medications   EPINEPHrine (ADRENALIN) injection (1 mg Intravenous Given 7/10/25 0839)   calcium chloride 1 g/10 mL injection (1 g Intravenous Given 7/10/25 0831)   sodium bicarbonate 50 mEq/50 mL injection (100 mEq Intravenous Given 7/10/25 0832)       ED Risk Strat Scores                    No data recorded                            History of Present Illness       Chief Complaint   Patient presents with    Cardiac Arrest       Past Medical History[1]   Past Surgical History[2]   Family History[3]   Social History[4]   No existing history information found.   No existing history information found.   I have reviewed and agree with the history as documented.     HPI    80-year-old male with history of atrial fibrillation, hypertension, hyperlipidemia, CAD, and bladder cancer status post transurethral resection of bladder tumor yesterday presenting as pre hospital cardiac arrest.  Per EMS, patient's wife called EMS because he was noted to be bleeding from his penis.  He did have surgery yesterday to remove a bladder tumor.  He was sitting on the toilet and then went unresponsive.  EMS states he initially had a blood pressure was very hypertensive.  Once he got out of the ambulance, he went into cardiac arrest.  They were unsure what rhythm he was doing but did not receive any shocks or medications en  route.  He was intubated prehospital.      Review of Systems   Unable to perform ROS: Unstable vital signs           Objective       ED Triage Vitals [07/10/25 0848]   Temp Pulse BP Respirations SpO2 Patient Position - Orthostatic VS   -- (!) 0 -- (!) 0 -- --      Temp src Heart Rate Source BP Location FiO2 (%) Pain Score    -- -- -- -- --      Vitals      Date and Time Temp Pulse SpO2 Resp BP Pain Score FACES Pain Rating User   07/10/25 0848 -- 0 -- 0 -- -- -- BL            Physical Exam  Vitals and nursing note reviewed.   Constitutional:       General: He is in acute distress.      Appearance: He is ill-appearing and toxic-appearing.   HENT:      Head: Normocephalic and atraumatic.      Right Ear: External ear normal.      Left Ear: External ear normal.      Nose: Nose normal.      Mouth/Throat:      Comments: ETT in place.    Cardiovascular:      Comments: No palpable carotid or femoral pulses.  Pulmonary:      Comments: Ventilated via BVM.  Abdominal:      General: Abdomen is flat. There is no distension.   Genitourinary:     Comments: Small amount of blood noted from urethral meatus.    Musculoskeletal:      Cervical back: Neck supple.     Skin:     Comments: Cool extremities.     Neurological:      Comments: Unresponsive.       Results Reviewed       Procedure Component Value Units Date/Time    POCT Blood Gas (CG8+) [[id number]]  (Abnormal) Collected: 07/10/25 0830    Lab Status: Final result Specimen: Venous Updated: 07/10/25 0834     ph, Rashi ISTAT 6.725     pCO2, Rashi i-STAT 67.9 mm HG      pO2, Rashi i-STAT 48.0 mm HG      BE, i-STAT -28 mmol/L      HCO3, Rashi i-STAT 8.9 mmol/L      CO2, i-STAT 11 mmol/L      O2 Sat, i-STAT 43 %      SODIUM, I-STAT 137 mmol/l      Potassium, i-STAT >8.0 mmol/L      Calcium, Ionized i-STAT 0.44 mmol/L      Hct, i-STAT 44 %      Hgb, i-STAT 15.0 g/dl      Glucose, i-STAT 512 mg/dl      Specimen Type VENOUS            No orders to display       Procedures    ED Medication and  Procedure Management   None     There are no discharge medications for this patient.    No discharge procedures on file.  ED SEPSIS DOCUMENTATION   Time reflects when diagnosis was documented in both MDM as applicable and the Disposition within this note       Time User Action Codes Description Comment    7/10/2025  9:20 AM Marielena Parekh [I46.9] Cardiac arrest (HCC)     7/10/2025  9:20 AM Marielena Parekh [E87.5] Hyperkalemia                    Marielena Parekh MD  07/10/25 9867         [1] No past medical history on file.  [2] No past surgical history on file.  [3] No family history on file.  [4]         Marielena Parekh MD  07/10/25 [id number]

## 2025-07-10 NOTE — ED NOTES
Spoke to [facility]. Pt is not a candidate for Organ Donation.     Stella Garner RN  07/10/25 0971     Hemigard Intro: Due to skin fragility and wound tension, it was decided to use HEMIGARD adhesive retention suture devices to permit a linear closure. The skin was cleaned and dried for a 6cm distance away from the wound. Excessive hair, if present, was removed to allow for adhesion.

## 2025-07-11 ENCOUNTER — TELEPHONE (OUTPATIENT)
Dept: UROLOGY | Facility: CLINIC | Age: 80
End: 2025-07-11

## 2025-07-11 ENCOUNTER — TELEPHONE (OUTPATIENT)
Age: 80
End: 2025-07-11

## 2025-07-11 NOTE — TELEPHONE ENCOUNTER
Pt was to be scheduled for f/u OR procedure. Saw PCP phone note from today stating pt passed 7/9. Provider to be notified, cx case request.

## 2025-07-15 PROCEDURE — 88307 TISSUE EXAM BY PATHOLOGIST: CPT | Performed by: STUDENT IN AN ORGANIZED HEALTH CARE EDUCATION/TRAINING PROGRAM

## (undated) DEVICE — Device

## (undated) DEVICE — 2000CC GUARDIAN II: Brand: GUARDIAN

## (undated) DEVICE — MAT FLOOR STEP DRI 24 X 36 IN N-STRL

## (undated) DEVICE — GUIDEWIRE WHOLEY HI TORQUE INTERM MOD J .035 145CM

## (undated) DEVICE — CATH FOLEY 24FR 5ML 2 WAY SILICONE ELASTIMER

## (undated) DEVICE — DGW .035 FC J3MM 260CM TEF: Brand: EMERALD

## (undated) DEVICE — TR BAND RADIAL ARTERY COMPRESSION DEVICE: Brand: TR BAND

## (undated) DEVICE — SINGLE-USE POLYPECTOMY SNARE: Brand: SENSATION SHORT THROW

## (undated) DEVICE — LUBRICANT SURGILUBE TUBE 4 OZ  FLIP TOP

## (undated) DEVICE — CHLORHEXIDINE 4PCT 4 OZ

## (undated) DEVICE — SLEEVE SCD KNEE MEDIUM

## (undated) DEVICE — GLIDESHEATH BASIC HYDROPHILIC COATED INTRODUCER SHEATH: Brand: GLIDESHEATH

## (undated) DEVICE — THE EXACTO COLD SNARE IS INTENDED TO BE USED WITHOUT DIATHERMIC ENERGY FOR THE ENDOSCOPIC RESECTION OF POLYP TISSUE IN THE GASTROINTESTINAL TRACT.: Brand: EXACTO

## (undated) DEVICE — RADIFOCUS OPTITORQUE ANGIOGRAPHIC CATHETER: Brand: OPTITORQUE

## (undated) DEVICE — DORNACH FILTER

## (undated) DEVICE — TUBING SUCTION 5MM X 12 FT